# Patient Record
Sex: FEMALE | Race: WHITE | NOT HISPANIC OR LATINO | Employment: OTHER | URBAN - METROPOLITAN AREA
[De-identification: names, ages, dates, MRNs, and addresses within clinical notes are randomized per-mention and may not be internally consistent; named-entity substitution may affect disease eponyms.]

---

## 2017-03-17 ENCOUNTER — OFFICE VISIT (OUTPATIENT)
Dept: OBSTETRICS AND GYNECOLOGY | Facility: CLINIC | Age: 72
End: 2017-03-17
Payer: COMMERCIAL

## 2017-03-17 VITALS
DIASTOLIC BLOOD PRESSURE: 60 MMHG | WEIGHT: 153.69 LBS | SYSTOLIC BLOOD PRESSURE: 100 MMHG | BODY MASS INDEX: 26.24 KG/M2 | HEIGHT: 64 IN

## 2017-03-17 DIAGNOSIS — Z01.419 WELL FEMALE EXAM WITH ROUTINE GYNECOLOGICAL EXAM: Primary | ICD-10-CM

## 2017-03-17 DIAGNOSIS — Z12.39 BREAST CANCER SCREENING: ICD-10-CM

## 2017-03-17 PROCEDURE — 99999 PR PBB SHADOW E&M-EST. PATIENT-LVL III: CPT | Mod: PBBFAC,,, | Performed by: OBSTETRICS & GYNECOLOGY

## 2017-03-17 PROCEDURE — 99397 PER PM REEVAL EST PAT 65+ YR: CPT | Mod: S$GLB,,, | Performed by: OBSTETRICS & GYNECOLOGY

## 2017-03-17 PROCEDURE — 88175 CYTOPATH C/V AUTO FLUID REDO: CPT

## 2017-03-17 NOTE — MR AVS SNAPSHOT
"    [facility]   [facility]  [place] [address]  Phone: [phone]                  Sury Gao   3/17/2017 3:30 PM   Office Visit    Description:  Female : 1945   Provider:  Magnolia Zavala MD   Department:  [facility]           Reason for Visit     Well Woman     Mammogram                To Do List           Future Appointments        Provider Department Dept Phone    3/17/2017 3:30 PM Magnolia Zavala MD [facility]/ -[phone]      Goals (5 Years of Data)     None      [facility]     [facility] Care [facility] -  Assistance  Registered nurses in the [facility] provide clinical advisement, health education, appointment booking, and other advisory services.  Call for this free service at [phone].             Medications           Message regarding Medications     Verify the changes and/or additions to your medication regime listed below are the same as discussed with your clinician today.  If any of these changes or additions are incorrect, please notify your healthcare provider.             Verify that the below list of medications is an accurate representation of the medications you are currently taking.  If none reported, the list may be blank. If incorrect, please contact your healthcare provider. Carry this list with you in case of emergency.           Current Medications     fexofenadine (ALLEGRA) 30 MG tablet Take 30 mg by mouth continuous prn.    levothyroxine (SYNTHROID) 75 MCG tablet Brand name only.    loratadine (CLARITIN) 10 mg tablet Take 10 mg by mouth daily as needed.           Clinical Reference Information           Your Vitals Were     BP Height Weight BMI       100/60 (BP Location: Left arm, Patient Position: Sitting, BP Method: Manual) 5' 4" (1.626 m) 69.7 kg (153 lb 10.6 oz) 26.38 kg/m2       Blood Pressure          Most Recent Value    BP  100/60      Allergies as of 3/17/2017     No " Known Allergies      Immunizations Administered on Date of Encounter - 3/17/2017     None      Language Assistance Services     ATTENTION: Language assistance services are available, free of charge. Please call [phone].      ATENCIÓN: Si habyovani pagan, tiene a aden disposición servicios gratuitos de asistencia lingüística. Llame al [phone].     CHÚ Ý: N?u b?n nói Ti?ng Vi?t, có các d?ch v? h? tr? ngôn ng? mi?n phí dành cho b?n. G?i s? [phone].         [facility] complies with applicable Federal civil rights laws and does not discriminate on the basis of race, color, national origin, age, disability, or sex.

## 2017-03-20 NOTE — PROGRESS NOTES
SUBJECTIVE:   71 y.o. female   for routine gyn exam. No LMP recorded. Patient is postmenopausal..  She has no unusual complaints.  No HRT.  No PMB         Past Medical History:   Diagnosis Date    Hypothyroidism     Nuclear sclerosis 2014    Thyroid disease     hypothyroid     Past Surgical History:   Procedure Laterality Date    gutierrez neuroma      left foot    WRIST SURGERY      left wrist: Swave-Kapunjay surgery     Social History     Social History    Marital status:      Spouse name: N/A    Number of children: N/A    Years of education: N/A     Occupational History    Not on file.     Social History Main Topics    Smoking status: Former Smoker    Smokeless tobacco: Never Used    Alcohol use Yes      Comment: social    Drug use: No    Sexual activity: Not Currently     Other Topics Concern    Not on file     Social History Narrative     Family History   Problem Relation Age of Onset    Hypothyroidism Mother     Cataracts Mother     Heart disease Mother      Valvular heart disease    Cataracts Father     Macular degeneration Father     Hypothyroidism Sister     Hypothyroidism Daughter     Cancer Brother      testicular cancer    Amblyopia Neg Hx     Blindness Neg Hx     Glaucoma Neg Hx     Retinal detachment Neg Hx     Strabismus Neg Hx     Breast cancer Neg Hx     Colon cancer Neg Hx     Diabetes Neg Hx     Hypertension Neg Hx     Ovarian cancer Neg Hx      OB History    Para Term  AB SAB TAB Ectopic Multiple Living   4 3   1 1    3      # Outcome Date GA Lbr Bertin/2nd Weight Sex Delivery Anes PTL Lv   4 SAB            3 Para            2 Para            1 Para                     Current Outpatient Prescriptions   Medication Sig Dispense Refill    fexofenadine (ALLEGRA) 30 MG tablet Take 30 mg by mouth continuous prn.      levothyroxine (SYNTHROID) 75 MCG tablet Brand name only. 90 tablet 3    loratadine (CLARITIN) 10 mg tablet Take 10 mg by  "mouth daily as needed.       No current facility-administered medications for this visit.      Allergies: Review of patient's allergies indicates no known allergies.     ROS:  Constitutional: no weight loss, weight gain, fever, fatigue  Eyes:  No vision changes, glasses/contacts  ENT/Mouth: No ulcers, sinus problems, ears ringing, headache  Cardiovascular: No inability to lie flat, chest pain, exercise intolerance, swelling, heart palpitations  Respiratory: No wheezing, coughing blood, shortness of breath, or cough  Gastrointestinal: No diarrhea, bloody stool, nausea/vomiting, constipation, gas, hemorrhoids  Genitourinary: No blood in urine, painful urination, urgency of urination, frequency of urination, incomplete emptying, incontinence, abnormal bleeding, painful periods, heavy periods, vaginal discharge, vaginal odor, painful intercourse, sexual problems, bleeding after intercourse.  Musculoskeletal: No muscle weakness  Skin/Breast: No painful breasts, nipple discharge, masses, rash, ulcers  Neurological: No passing out, seizures, numbness, headache  Endocrine: No diabetes, hypothyroid, hyperthyroid, hot flashes, hair loss, abnormal hair growth, ance  Psychiatric: No depression, crying  Hematologic: No bruises, bleeding, swollen lymph nodes, anemia.      OBJECTIVE:   The patient appears well, alert, oriented x 3, in no distress.  /60 (BP Location: Left arm, Patient Position: Sitting, BP Method: Manual)  Ht 5' 4" (1.626 m)  Wt 69.7 kg (153 lb 10.6 oz)  BMI 26.38 kg/m2  NECK: no thyromegaly, trachea midline  SKIN: no acne, striae, hirsutism  CHEST: CTAB  CV: RRR  BREAST EXAM: breasts appear normal, no suspicious masses, no skin or nipple changes or axillary nodes  ABDOMEN: no hernias, masses, or hepatosplenomegaly  GENITALIA: normal external genitalia, no erythema, no discharge  URETHRA: normal urethra, normal urethral meatus  VAGINA: Normal  CERVIX: no lesions or cervical motion tenderness  UTERUS: " normal size, contour, position, consistency, mobility, non-tender  ADNEXA: no mass, fullness, tenderness      ASSESSMENT:   1. Well female exam with routine gynecological exam  Liquid-based pap smear, screening   2. Breast cancer screening  Mammo Digital Screening Bilat with CAD       PLAN:   Orders Placed This Encounter    Mammo Digital Screening Bilat with CAD    Liquid-based pap smear, screening     Return to clinic in 1 year

## 2017-03-30 ENCOUNTER — HOSPITAL ENCOUNTER (OUTPATIENT)
Dept: RADIOLOGY | Facility: HOSPITAL | Age: 72
Discharge: HOME | End: 2017-03-30
Attending: OBSTETRICS & GYNECOLOGY
Payer: COMMERCIAL

## 2017-03-30 DIAGNOSIS — Z12.39 BREAST CANCER SCREENING: ICD-10-CM

## 2017-03-30 PROCEDURE — 77067 SCR MAMMO BI INCL CAD: CPT | Mod: 26,,, | Performed by: RADIOLOGY

## 2017-03-30 PROCEDURE — 77063 BREAST TOMOSYNTHESIS BI: CPT | Mod: 26,,, | Performed by: RADIOLOGY

## 2017-03-30 PROCEDURE — 77067 SCR MAMMO BI INCL CAD: CPT | Mod: TC

## 2017-04-07 ENCOUNTER — HOSPITAL ENCOUNTER (OUTPATIENT)
Dept: RADIOLOGY | Facility: HOSPITAL | Age: 72
Discharge: HOME | End: 2017-04-07
Attending: FAMILY MEDICINE
Payer: COMMERCIAL

## 2017-04-07 ENCOUNTER — OFFICE VISIT (OUTPATIENT)
Dept: INTERNAL MEDICINE | Facility: CLINIC | Age: 72
End: 2017-04-07
Payer: COMMERCIAL

## 2017-04-07 VITALS
SYSTOLIC BLOOD PRESSURE: 114 MMHG | WEIGHT: 151.88 LBS | DIASTOLIC BLOOD PRESSURE: 62 MMHG | HEART RATE: 74 BPM | HEIGHT: 64 IN | TEMPERATURE: 98 F | BODY MASS INDEX: 25.93 KG/M2

## 2017-04-07 DIAGNOSIS — M79.674 TOE PAIN, RIGHT: Primary | ICD-10-CM

## 2017-04-07 DIAGNOSIS — M79.674 TOE PAIN, RIGHT: ICD-10-CM

## 2017-04-07 PROCEDURE — 1160F RVW MEDS BY RX/DR IN RCRD: CPT | Mod: S$GLB,,, | Performed by: FAMILY MEDICINE

## 2017-04-07 PROCEDURE — 1159F MED LIST DOCD IN RCRD: CPT | Mod: S$GLB,,, | Performed by: FAMILY MEDICINE

## 2017-04-07 PROCEDURE — 1157F ADVNC CARE PLAN IN RCRD: CPT | Mod: S$GLB,,, | Performed by: FAMILY MEDICINE

## 2017-04-07 PROCEDURE — 73630 X-RAY EXAM OF FOOT: CPT | Mod: TC,PO,RT

## 2017-04-07 PROCEDURE — 1125F AMNT PAIN NOTED PAIN PRSNT: CPT | Mod: S$GLB,,, | Performed by: FAMILY MEDICINE

## 2017-04-07 PROCEDURE — 99214 OFFICE O/P EST MOD 30 MIN: CPT | Mod: S$GLB,,, | Performed by: FAMILY MEDICINE

## 2017-04-07 PROCEDURE — 99999 PR PBB SHADOW E&M-EST. PATIENT-LVL III: CPT | Mod: PBBFAC,,, | Performed by: FAMILY MEDICINE

## 2017-04-07 PROCEDURE — 73630 X-RAY EXAM OF FOOT: CPT | Mod: 26,RT,, | Performed by: RADIOLOGY

## 2017-04-07 RX ORDER — METHYLPREDNISOLONE 4 MG/1
TABLET ORAL
Qty: 1 PACKAGE | Refills: 0 | Status: SHIPPED | OUTPATIENT
Start: 2017-04-07 | End: 2017-04-28

## 2017-04-07 NOTE — PROGRESS NOTES
Subjective:       Patient ID: Sury Gao is a 71 y.o. female.    Chief Complaint: Leg Pain and Foot Swelling (possible gout)    HPI 71-year-old white female presents to clinic today secondary to right great toe pain and bilateral leg swelling that has been worsening since Saturday.  She reports an initial possible injury to her right toe approximately 1-1/2 weeks ago when she was doing aerobics without wearing shoes.  The following day she began having pain to her right great toe.  A physician friend of hers examined the toe at that time.  Her toe pain improved but on Saturday the pain returned.  The patient reports that she had dinner consisting of seafood and steaks 4 nights in a row.  The pain has gradually worsened and on Saturday she began to notice swelling to the bilateral feet.  Swelling has improved with rest but the right great toe pain persists.  Review of Systems   Constitutional: Negative for appetite change, chills, fatigue and fever.   HENT: Negative for congestion, ear pain, hearing loss, postnasal drip, rhinorrhea, sinus pressure, sore throat and tinnitus.    Eyes: Negative for redness, itching and visual disturbance.   Respiratory: Negative for cough, chest tightness and shortness of breath.    Cardiovascular: Negative for chest pain and palpitations.   Gastrointestinal: Negative for abdominal pain, constipation, diarrhea, nausea and vomiting.   Genitourinary: Negative for decreased urine volume, difficulty urinating, dysuria, frequency, hematuria and urgency.   Musculoskeletal: Positive for arthralgias (Feet and toes). Negative for back pain, myalgias, neck pain and neck stiffness.   Skin: Negative for rash.   Neurological: Negative for dizziness, light-headedness and headaches.   Psychiatric/Behavioral: Negative.        Objective:      Physical Exam   Constitutional: She is oriented to person, place, and time. She appears well-developed and well-nourished. No distress.   HENT:   Head:  Normocephalic and atraumatic.   Right Ear: External ear normal.   Left Ear: External ear normal.   Nose: Nose normal.   Mouth/Throat: Oropharynx is clear and moist. No oropharyngeal exudate.   Eyes: Conjunctivae and EOM are normal. Pupils are equal, round, and reactive to light. Right eye exhibits no discharge. Left eye exhibits no discharge. No scleral icterus.   Neck: Normal range of motion.   Musculoskeletal: Normal range of motion. She exhibits no edema.        Right foot: There is tenderness (tenderness to palpation to the right great toe).   Neurological: She is alert and oriented to person, place, and time.   Skin: Skin is warm and dry. No rash noted. She is not diaphoretic. No erythema. No pallor.   Psychiatric: She has a normal mood and affect. Her behavior is normal. Judgment and thought content normal.   Nursing note and vitals reviewed.      Assessment:       1. Toe pain, right        Plan:       Toe pain, right  -     Uric acid; Future; Expected date: 4/7/17  -     X-Ray Foot Complete 3 view Right; Future; Expected date: 4/7/17  -     methylPREDNISolone (MEDROL DOSEPACK) 4 mg tablet; use as directed  Dispense: 1 Package; Refill: 0      Return to clinic as needed if symptoms persist or worsen.

## 2017-04-07 NOTE — MR AVS SNAPSHOT
[facility]   [facility]  [place] [address]  Phone: [phone]  Fax: [phone]                  Sury Meza Murray   2017 3:40 PM   Office Visit    Description:  Female : 1945   Provider:  Aaron Ann MD   Department:  [facility]           Reason for Visit     Leg Pain     Foot Swelling           Diagnoses this Visit        Comments    Toe pain, right    -  Primary            To Do List           Future Appointments        Provider Department Dept Phone    2017 4:15 PM LAB, [place] [facility] [phone]      Goals (5 Years of Data)     None      Follow-Up and Disposition     Return if symptoms worsen or fail to improve.       These Medications        Disp Refills Start End    methylPREDNISolone (MEDROL DOSEPACK) 4 mg tablet 1 Package 0 2017    use as directed    Pharmacy: [id number] - [place] - [address] [facility] #: [phone]         [facility]     [facility] Nurse Care Line -  Assistance  Unless otherwise directed by your provider, please contact [facility], our nurse care line that is available for  assistance.     Registered nurses in the [facility] provide: appointment scheduling, clinical advisement, health education, and other advisory services.  Call: [phone] (toll free)               Medications           Message regarding Medications     Verify the changes and/or additions to your medication regime listed below are the same as discussed with your clinician today.  If any of these changes or additions are incorrect, please notify your healthcare provider.        START taking these NEW medications        Refills    methylPREDNISolone (MEDROL DOSEPACK) 4 mg tablet 0    Sig: use as directed    Class: Normal           Verify that the below list of medications is an accurate representation of the medications you are currently  "taking.  If none reported, the list may be blank. If incorrect, please contact your healthcare provider. Carry this list with you in case of emergency.           Current Medications     fexofenadine (ALLEGRA) 30 MG tablet Take 30 mg by mouth continuous prn.    levothyroxine (SYNTHROID) 75 MCG tablet Brand name only.    loratadine (CLARITIN) 10 mg tablet Take 10 mg by mouth daily as needed.    methylPREDNISolone (MEDROL DOSEPACK) 4 mg tablet use as directed           Clinical Reference Information           Your Vitals Were     BP Pulse Temp Height Weight BMI    114/62 (BP Location: Left arm, Patient Position: Sitting, BP Method: Manual) 74 97.8 °F (36.6 °C) (Oral) 5' 4" (1.626 m) 68.9 kg (151 lb 14.4 oz) 26.07 kg/m2      Blood Pressure          Most Recent Value    BP  114/62      Allergies as of 4/7/2017     No Known Allergies      Immunizations Administered on Date of Encounter - 4/7/2017     None      Orders Placed During Today's Visit     Future Labs/Procedures Expected by Expires    Uric acid  4/7/2017 6/6/2018    X-Ray Foot Complete 3 view Right  4/7/2017 4/7/2018      [facility] Sign-Up     Activating your [facility] account is as easy as 1-2-3!     1) Visit [email or web address], select Sign Up Now, enter this activation code and your date of birth, then select Next.  Activation code not generated  Current Patient Portal Status: Account disabled      2) Create a username and password to use when you visit [facility] in the future and select a security question in case you lose your password and select Next.    3) Enter your e-mail address and click Sign Up!    Additional Information  If you have questions, please e-mail [email or web address] or call [phone] to talk to our [facility] staff. Remember, [facility] is NOT to be used for urgent needs. For medical emergencies, dial 911.         Language Assistance Services     ATTENTION: Language assistance services are available, free of charge. Please call " [phone].      ATENCIÓN: Si habla español, tiene a aden disposición servicios gratuitos de asistencia lingüística. Llame al [phone].     CHÚ Ý: N?u b?n nói Ti?ng Vi?t, có các d?ch v? h? tr? ngôn ng? mi?n phí dành cho b?n. G?i s? [phone].         [facility] complies with applicable Federal civil rights laws and does not discriminate on the basis of race, color, national origin, age, disability, or sex.

## 2017-04-10 ENCOUNTER — TELEPHONE (OUTPATIENT)
Dept: INTERNAL MEDICINE | Facility: CLINIC | Age: 72
End: 2017-04-10

## 2017-04-10 NOTE — TELEPHONE ENCOUNTER
----- Message from Aaron Ann MD sent at 4/9/2017  1:25 PM CDT -----  Uric acid level is normal.  Please inform the patient.  Thank you.

## 2017-09-01 DIAGNOSIS — Z00.00 WELL ADULT EXAM: Primary | ICD-10-CM

## 2017-09-01 DIAGNOSIS — E03.9 HYPOTHYROIDISM, UNSPECIFIED TYPE: ICD-10-CM

## 2017-09-01 RX ORDER — LEVOTHYROXINE SODIUM 75 UG/1
TABLET ORAL
Qty: 90 TABLET | Refills: 3 | Status: SHIPPED | OUTPATIENT
Start: 2017-09-01 | End: 2017-10-27 | Stop reason: SDUPTHER

## 2017-09-01 NOTE — TELEPHONE ENCOUNTER
----- Message from Dayna Quispe sent at 9/1/2017 11:13 AM CDT -----  Contact: self/ [phone]  Doctor appointment and lab have been scheduled.  Please link lab orders to the lab appointment.  Date of doctor appointment: 10/27/17  Physical or EP:  phys  Date of lab appointment:  10/23/17  Comments:     RX request - refill or new RX.  Is this a refill or new RX:    RX name and strength: levothyroxine (SYNTHROID) 75 MCG tablet 90 tablet   Directions:   Is this a 30 day or 90 day RX:    Pharmacy name and phone #: [facility]/pharmacy #[id number] - [place] - [address] [facility] [phone] (Phone)  [phone] (Fax)      Comments:   Patient is going [place] country [place]17[place] and would like her  levothyroxine filled, please call and advise. Patient has scheduled her phys.

## 2017-09-30 DIAGNOSIS — E03.9 HYPOTHYROIDISM, UNSPECIFIED TYPE: ICD-10-CM

## 2017-09-30 RX ORDER — LEVOTHYROXINE SODIUM 75 UG/1
TABLET ORAL
Qty: 90 TABLET | Refills: 3 | Status: SHIPPED | OUTPATIENT
Start: 2017-09-30 | End: 2018-09-26 | Stop reason: SDUPTHER

## 2017-10-23 ENCOUNTER — LAB VISIT (OUTPATIENT)
Dept: LAB | Facility: HOSPITAL | Age: 72
End: 2017-10-23
Attending: FAMILY MEDICINE
Payer: COMMERCIAL

## 2017-10-23 DIAGNOSIS — Z00.00 WELL ADULT EXAM: ICD-10-CM

## 2017-10-23 DIAGNOSIS — E03.9 HYPOTHYROIDISM, UNSPECIFIED TYPE: ICD-10-CM

## 2017-10-23 LAB
25(OH)D3+25(OH)D2 SERPL-MCNC: 18 NG/ML
ALBUMIN SERPL BCP-MCNC: 3 G/DL
ALP SERPL-CCNC: 45 U/L
ALT SERPL W/O P-5'-P-CCNC: 13 U/L
ANION GAP SERPL CALC-SCNC: 5 MMOL/L
AST SERPL-CCNC: 17 U/L
BASOPHILS # BLD AUTO: 0.09 K/UL
BASOPHILS NFR BLD: 1.4 %
BILIRUB SERPL-MCNC: 0.6 MG/DL
BUN SERPL-MCNC: 19 MG/DL
CALCIUM SERPL-MCNC: 9.1 MG/DL
CHLORIDE SERPL-SCNC: 106 MMOL/L
CHOLEST SERPL-MCNC: 248 MG/DL
CHOLEST/HDLC SERPL: 3.4 {RATIO}
CO2 SERPL-SCNC: 28 MMOL/L
CREAT SERPL-MCNC: 0.9 MG/DL
DIFFERENTIAL METHOD: ABNORMAL
EOSINOPHIL # BLD AUTO: 0.1 K/UL
EOSINOPHIL NFR BLD: 1.7 %
ERYTHROCYTE [DISTWIDTH] IN BLOOD BY AUTOMATED COUNT: 12.9 %
EST. GFR  (AFRICAN AMERICAN): >60 ML/MIN/1.73 M^2
EST. GFR  (NON AFRICAN AMERICAN): >60 ML/MIN/1.73 M^2
ESTIMATED AVG GLUCOSE: 105 MG/DL
GLUCOSE SERPL-MCNC: 99 MG/DL
HBA1C MFR BLD HPLC: 5.3 %
HCT VFR BLD AUTO: 44.5 %
HDLC SERPL-MCNC: 73 MG/DL
HDLC SERPL: 29.4 %
HGB BLD-MCNC: 14.6 G/DL
IMM GRANULOCYTES # BLD AUTO: 0.04 K/UL
IMM GRANULOCYTES NFR BLD AUTO: 0.6 %
LDLC SERPL CALC-MCNC: 162.2 MG/DL
LYMPHOCYTES # BLD AUTO: 2.5 K/UL
LYMPHOCYTES NFR BLD: 38.7 %
MCH RBC QN AUTO: 30.5 PG
MCHC RBC AUTO-ENTMCNC: 32.8 G/DL
MCV RBC AUTO: 93 FL
MONOCYTES # BLD AUTO: 0.5 K/UL
MONOCYTES NFR BLD: 7.1 %
NEUTROPHILS # BLD AUTO: 3.2 K/UL
NEUTROPHILS NFR BLD: 50.5 %
NONHDLC SERPL-MCNC: 175 MG/DL
NRBC BLD-RTO: 0 /100 WBC
PLATELET # BLD AUTO: 250 K/UL
PMV BLD AUTO: 11.7 FL
POTASSIUM SERPL-SCNC: 4.8 MMOL/L
PROT SERPL-MCNC: 6 G/DL
RBC # BLD AUTO: 4.78 M/UL
SODIUM SERPL-SCNC: 139 MMOL/L
T4 FREE SERPL-MCNC: 0.93 NG/DL
TRIGL SERPL-MCNC: 64 MG/DL
TSH SERPL DL<=0.005 MIU/L-ACNC: 2.47 UIU/ML
WBC # BLD AUTO: 6.38 K/UL

## 2017-10-23 PROCEDURE — 36415 COLL VENOUS BLD VENIPUNCTURE: CPT | Mod: PO

## 2017-10-23 PROCEDURE — 80053 COMPREHEN METABOLIC PANEL: CPT

## 2017-10-23 PROCEDURE — 83036 HEMOGLOBIN GLYCOSYLATED A1C: CPT

## 2017-10-23 PROCEDURE — 84443 ASSAY THYROID STIM HORMONE: CPT

## 2017-10-23 PROCEDURE — 82306 VITAMIN D 25 HYDROXY: CPT

## 2017-10-23 PROCEDURE — 85025 COMPLETE CBC W/AUTO DIFF WBC: CPT

## 2017-10-23 PROCEDURE — 80061 LIPID PANEL: CPT

## 2017-10-23 PROCEDURE — 84439 ASSAY OF FREE THYROXINE: CPT

## 2017-10-24 ENCOUNTER — TELEPHONE (OUTPATIENT)
Dept: INTERNAL MEDICINE | Facility: CLINIC | Age: 72
End: 2017-10-24

## 2017-10-24 DIAGNOSIS — E78.5 HYPERLIPIDEMIA, UNSPECIFIED HYPERLIPIDEMIA TYPE: Primary | ICD-10-CM

## 2017-10-24 DIAGNOSIS — E55.9 VITAMIN D DEFICIENCY: ICD-10-CM

## 2017-10-24 RX ORDER — ERGOCALCIFEROL 1.25 MG/1
50000 CAPSULE ORAL
Qty: 4 CAPSULE | Refills: 2 | Status: SHIPPED | OUTPATIENT
Start: 2017-10-24 | End: 2017-11-23

## 2017-10-24 NOTE — TELEPHONE ENCOUNTER
The patient's labs are overall normal except for a low vitamin D level for which I have prescribed vitamin D 50,000 units once weekly to be taken for 3 months.  I will repeat a vitamin D level in 3 months.  The patient's cholesterol is also high.  I recommend that she follows a low-fat diet for treatment.  Please inform the patient.  Thank you.

## 2017-10-25 NOTE — TELEPHONE ENCOUNTER
Made lab appt for Vitamin D & Lipid panel for in 3 months. Pt has appt on Friday. Will give her the appt time information on Friday

## 2017-10-25 NOTE — TELEPHONE ENCOUNTER
Spoke with pt re: labs. Labs, WNL except for vitamin D & cholesterol. Pt just got back from [place], so that could be why her cholesterol is high otherwise she normally follows a low fat diet.  We will recheck vitamin D in 3 months & possibly cholesterol too.

## 2017-10-27 ENCOUNTER — OFFICE VISIT (OUTPATIENT)
Dept: INTERNAL MEDICINE | Facility: CLINIC | Age: 72
End: 2017-10-27
Payer: COMMERCIAL

## 2017-10-27 VITALS
BODY MASS INDEX: 26.31 KG/M2 | HEART RATE: 70 BPM | HEIGHT: 64 IN | DIASTOLIC BLOOD PRESSURE: 60 MMHG | OXYGEN SATURATION: 96 % | WEIGHT: 154.13 LBS | TEMPERATURE: 98 F | RESPIRATION RATE: 16 BRPM | SYSTOLIC BLOOD PRESSURE: 106 MMHG

## 2017-10-27 DIAGNOSIS — Z12.11 COLON CANCER SCREENING: ICD-10-CM

## 2017-10-27 DIAGNOSIS — E78.5 HYPERLIPIDEMIA, UNSPECIFIED HYPERLIPIDEMIA TYPE: ICD-10-CM

## 2017-10-27 DIAGNOSIS — Z00.00 WELL ADULT EXAM: Primary | ICD-10-CM

## 2017-10-27 DIAGNOSIS — E03.9 HYPOTHYROIDISM, UNSPECIFIED TYPE: ICD-10-CM

## 2017-10-27 DIAGNOSIS — E55.9 VITAMIN D DEFICIENCY: ICD-10-CM

## 2017-10-27 DIAGNOSIS — Z23 NEED FOR 23-POLYVALENT PNEUMOCOCCAL POLYSACCHARIDE VACCINE: ICD-10-CM

## 2017-10-27 PROCEDURE — 90732 PPSV23 VACC 2 YRS+ SUBQ/IM: CPT | Mod: S$GLB,,, | Performed by: FAMILY MEDICINE

## 2017-10-27 PROCEDURE — 99397 PER PM REEVAL EST PAT 65+ YR: CPT | Mod: 25,S$GLB,, | Performed by: FAMILY MEDICINE

## 2017-10-27 PROCEDURE — 99999 PR PBB SHADOW E&M-EST. PATIENT-LVL III: CPT | Mod: PBBFAC,,, | Performed by: FAMILY MEDICINE

## 2017-10-27 PROCEDURE — 90471 IMMUNIZATION ADMIN: CPT | Mod: S$GLB,,, | Performed by: FAMILY MEDICINE
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
## 2017-11-17 DIAGNOSIS — Z12.11 SPECIAL SCREENING FOR MALIGNANT NEOPLASMS, COLON: Primary | ICD-10-CM

## 2017-11-17 RX ORDER — SODIUM, POTASSIUM,MAG SULFATES 17.5-3.13G
SOLUTION, RECONSTITUTED, ORAL ORAL
Qty: 1 BOTTLE | Refills: 0 | Status: SHIPPED | OUTPATIENT
Start: 2017-11-17 | End: 2017-12-20

## 2017-12-12 ENCOUNTER — TELEPHONE (OUTPATIENT)
Dept: OBSTETRICS AND GYNECOLOGY | Facility: CLINIC | Age: 72
End: 2017-12-12

## 2017-12-12 NOTE — TELEPHONE ENCOUNTER
----- Message from Angela Villarreal sent at 12/12/2017  8:48 AM CST -----  Contact: pt  _  1st Request  _  2nd Request  _  3rd x      Who:pt    Why: returning call back    What Number to Call Back: [phone]    When to Expect a call back: (Before the end of the day)   -- if call after 3:00 call back will be tomorrow.

## 2017-12-12 NOTE — TELEPHONE ENCOUNTER
Patient was scheduled for 12/20/2017 for possible bladder prolapse. I offered her an appointment today- she is a  an has cases this am. Patient scheduled for 12/20/2017

## 2017-12-12 NOTE — TELEPHONE ENCOUNTER
----- Message from Dulce Goodwin sent at 12/12/2017  8:34 AM CST -----  Contact: Patient    _1st Request  _  2nd Request  _  3rd Request    Who:ANGELO MCKEON [[id number]]    Why:Patient states she needs to be seen asap because she states she feels like she has a prolapse bladder     What Number to Call Back:[phone]    When to Expect a call back: (Before the end of the day)   -- if call after 3:00 call back will be tomorrow.

## 2017-12-20 ENCOUNTER — INITIAL CONSULT (OUTPATIENT)
Dept: UROGYNECOLOGY | Facility: CLINIC | Age: 72
End: 2017-12-20
Payer: COMMERCIAL

## 2017-12-20 VITALS
SYSTOLIC BLOOD PRESSURE: 102 MMHG | BODY MASS INDEX: 25.97 KG/M2 | HEIGHT: 64 IN | DIASTOLIC BLOOD PRESSURE: 70 MMHG | WEIGHT: 152.13 LBS

## 2017-12-20 DIAGNOSIS — N81.2 UTEROVAGINAL PROLAPSE, INCOMPLETE: ICD-10-CM

## 2017-12-20 DIAGNOSIS — N81.6 POSTERIOR VAGINAL WALL PROLAPSE: ICD-10-CM

## 2017-12-20 DIAGNOSIS — R35.0 URINARY FREQUENCY: Primary | ICD-10-CM

## 2017-12-20 DIAGNOSIS — R39.15 URINARY URGENCY: ICD-10-CM

## 2017-12-20 DIAGNOSIS — N81.10 PROLAPSE OF ANTERIOR VAGINAL WALL: ICD-10-CM

## 2017-12-20 PROCEDURE — 51701 INSERT BLADDER CATHETER: CPT | Mod: S$GLB,,, | Performed by: OBSTETRICS & GYNECOLOGY

## 2017-12-20 PROCEDURE — 99999 PR PBB SHADOW E&M-EST. PATIENT-LVL III: CPT | Mod: PBBFAC,,, | Performed by: OBSTETRICS & GYNECOLOGY

## 2017-12-20 PROCEDURE — 81002 URINALYSIS NONAUTO W/O SCOPE: CPT | Mod: S$GLB,,, | Performed by: OBSTETRICS & GYNECOLOGY

## 2017-12-20 PROCEDURE — 87086 URINE CULTURE/COLONY COUNT: CPT

## 2017-12-20 PROCEDURE — 99204 OFFICE O/P NEW MOD 45 MIN: CPT | Mod: 25,S$GLB,, | Performed by: OBSTETRICS & GYNECOLOGY

## 2017-12-20 NOTE — LETTER
December 20, 2017      Magnolia Zavala MD  [address]  [address]  [place] [address]           [facility]  [address] [address]  Phone: [phone]          Patient: Sury Gao   MR Number: [id number]   YOB: 1945   Date of Visit: 12/20/2017       Dear Dr. Magnolia Zavala:    Thank you for referring Sury Gao to me for evaluation. Attached you will find relevant portions of my assessment and plan of care.    If you have questions, please do not hesitate to call me. I look forward to following Sury Gao along with you.    Sincerely,    DO Bev Banejree  CC:  No Recipients    If you would like to receive this communication electronically, please contact [email or web address] or [phone] to request more information on [facility] access.    For providers and/or their staff who would like to refer a patient to [facility], please contact us through our one-stop-shop provider referral line, [facility], at [phone].    If you feel you have received this communication in error or would no longer like to receive these types of communications, please e-mail [email or web address]

## 2017-12-20 NOTE — PROGRESS NOTES
Subjective:       Patient ID: Sury Gao is a 72 y.o. female.    Chief Complaint:  Vaginal Prolapse; Urinary Incontinence; and PELVIC PRESSURE      History of Present Illness 72 Y O F P3 with bothersome one vaginal bulge and pelvic pressure noted while she was exercising. Patient has also noted urinary incontinence associated with urgency. She was referred for evaluation by Dr. Zavala.     HPI [facility] P[facility] Urogyn Hpi     Question 12/20/2017  2:12 PM CST   General Urogynecology: Are you experiencing the following?    Dysuria (painful urination) No   Nocturia:  waking up at night to empty your bladder  Yes more bothersome   If you answered yes to the previous question, how many times does this happen per night? 1-2   Enuresis (urine loss during sleep) No   Dribbling urine after you urinate Yes   Hematuria (urine appears red) No   Type of stream Interrupted, recent    Urinary frequency: How often a day are you going to the bathroom per day?  10-15   Urinary Tract Infections: How many Urinary Tract Infections have you had in the past year? One (1) in the past year   If you have had a UTI in the past year, what treatments have you had so far?  I have not had a UTI in the past year   Urinary Incontinence (General): Are you experiencing the following?    Past consultation for incontinence: Have you ever seen someone for the evaluation of incontinence? No   If you answered yes to the previous question, please select all the therapies you have tried.  N/a- I answered no to the previous question   Please note the effectiveness of the therapies.    Need to wear protection to keep clothes dry  No   If you answered yes to the previous question, please yodit the protection you use.  N/a- I answered no to the previous question   If you wear protection, how much wetness is typically on each pad? N/a- I do not wear protection   If you wear protection, how often do you have to change per day, if applicable?     Stress  "Symptoms: Are you experiencing the following?    Leakage of urine with cough, laugh and/or sneeze Yes   If you answered yes to the previous question, what is the frequency in days, weeks and/or months? Several times a month   Leakage of urine with sex No   Leakage of urine with bending/ lifting No   Leakage of urine with briskly walking or jogging Yes   If you lose urine for any other reason not previously mentioned, please note it below, if applicable.     Urge Symptoms: Are you experiencing the following?    Urgency ("got to go" feeling) No   Urge: How frequently do you feel an urge to urinate (feeling like you "gotta go" to the bathroom and can't wait) Weekly   Do you experience a leakage of urine when you have a feeling of urgency?  Yes   Leakage of urine when unaware No   Past use of anticholinergics (medications used to treat overactive bladder) No   If you answered yes to the previous question, please yodit the anticholinergics you have used:     Have you ever used Mirbetriq (aka Mirabegron)?  No   Prolapse Symptoms: Are you experiencing any of the following?     Falling out/ Bulging/ Heaviness in the vagina Yes   Vaginal/ Abdominal Pain/ Pressure Yes   Need to strain/ Push to void No   Need to wait on the toilet before you void No   Unusual position to urinate (using your hands to push back the vaginal bulge) No   Sensation of incomplete emptying Yes, new since the prolapse    Past use of pessary device No   If you answered yes to the previous question, please list the devices you have used below.     Bowel Symptoms: Are you experiencing any of the following?    Constipation No   Diarrhea  No   Hematochezia (bloody stool) No   Incomplete evacuation of stool No   Involuntary loss of formed stool No   Fecal smearing/urgency No   Involuntary loss of gas No   Vaginal Symptoms: Are you experiencing any of the following?     Abnormal vaginal bleeding  No   Vaginal dryness No   Sexually active  No   Dyspareunia " (painful intercourse) No   Estrogen use  No     GYN & OB History  No LMP recorded. Patient is postmenopausal.   Date of Last Pap: 3/23/2017    OB History    Para Term  AB Living   4 3     1 3   SAB TAB Ectopic Multiple Live Births   1       3      # Outcome Date GA Lbr Bertin/2nd Weight Sex Delivery Anes PTL Lv   4 SAB            3 Para            2 Para            1 Para                  VDX 3   Largest: 9#   Forceps: No  3rd  Or 4th degree : ?     Past Medical History:   Diagnosis Date    Hypothyroidism     Nuclear sclerosis 2014    Thyroid disease     hypothyroid     Past Surgical History:   Procedure Laterality Date    Bilateral lower extremity varicose vein treatment       gutierrez neuroma      left foot    WRIST SURGERY      left wrist: Swave-Kapunjay surgery   D&C - 45 years ago     Review of patient's allergies indicates:  No Known Allergies      Current Outpatient Prescriptions:     fexofenadine (ALLEGRA) 30 MG tablet, Take 30 mg by mouth continuous prn., Disp: , Rfl:     loratadine (CLARITIN) 10 mg tablet, Take 10 mg by mouth daily as needed., Disp: , Rfl:     SYNTHROID 75 mcg tablet, TAKE 1 TABLET BY MOUTH DAILY, Disp: 90 tablet, Rfl: 3    Review of Systems  Review of Systems   Constitutional: Negative.  Negative for activity change, appetite change, chills, diaphoresis, fatigue, fever and unexpected weight change.   HENT: Negative.    Eyes: Negative.    Respiratory: Negative.  Negative for cough.    Cardiovascular: Negative.    Gastrointestinal: Negative for abdominal distention, abdominal pain, anal bleeding, blood in stool, constipation, diarrhea, nausea, rectal pain and vomiting.   Endocrine: Negative.    Genitourinary: Positive for urgency. Negative for decreased urine volume, dyspareunia, dysuria, enuresis, flank pain, frequency, genital sores, hematuria, menstrual problem, pelvic pain, vaginal bleeding, vaginal discharge and vaginal pain.        Prolapse  + vaginal bulge    +vaginal pressure    Musculoskeletal: Negative for back pain.   Skin: Negative for color change, pallor, rash and wound.   Allergic/Immunologic: Negative for environmental allergies, food allergies and immunocompromised state.   Hematological: Negative for adenopathy. Does not bruise/bleed easily.   Psychiatric/Behavioral: Negative for agitation, behavioral problems, confusion and sleep disturbance.           Objective:     Physical Exam   Constitutional: She is oriented to person, place, and time. She appears well-developed.   HENT:   Head: Normocephalic and atraumatic.   Eyes: Conjunctivae and EOM are normal.   Neck: Normal range of motion. Neck supple.   Cardiovascular: Normal rate, regular rhythm, S1 normal, S2 normal, normal heart sounds and intact distal pulses.    Pulmonary/Chest: Effort normal and breath sounds normal. She exhibits no tenderness.   Abdominal: Soft. Bowel sounds are normal. She exhibits no distension and no mass. There is no hepatosplenomegaly, splenomegaly or hepatomegaly. There is no tenderness. There is no rigidity, no rebound, no guarding and no CVA tenderness. No hernia.   Genitourinary: Pelvic exam was performed with patient supine and standing. Rectum normal, vagina normal, uterus normal, cervix normal, skenes normal and bartholins normal. Right labia normal and left labia normal. Urethra exhibits hypermobility. Urethra exhibits no urethral caruncle, no urethral diverticulum and no urethral mass. Right bartholin is not enlarged and not tender. Left bartholin is not enlarged and not tender. Rectal exam shows resting tone normal and active tone normal. Rectal exam shows no external hemorrhoid, no fissure, no tenderness, anal tone normal and no dovetailing. Guaiac negative stool. There is a cystocele, unspecified prolapse of vaginal walls and atrophy in the vagina. No foreign body, tenderness, bleeding, fistula, mesh exposure or lavator tenderness in the vagina. No vaginal discharge  found. Right adnexum displays no mass and no tenderness. Left adnexum displays no mass and no tenderness. Cervix exhibits no motion tenderness and no discharge. Uterus is experiencing uterine prolapse. Uterus is not tender.   PVR: 70 ML  Empty cough stress test: Negative.  Kegel: 3/5    POP-Q  Aa: 0 Ba: 0 C: -2   GH: 4 PB: 2 TVL: 9   Ap: -1 Bp: -1 D: -4                     Musculoskeletal: Normal range of motion.   Lymphadenopathy:     She has no axillary adenopathy.        Right: No inguinal adenopathy present.        Left: No inguinal adenopathy present.   Neurological: She is alert and oriented to person, place, and time. She has normal strength and normal reflexes. Cranial nerves II through XII intact. No cranial nerve deficit.   Skin: Skin is warm, dry and intact.   Psychiatric: She has a normal mood and affect. Her speech is normal and behavior is normal. Judgment normal. Cognition and memory are normal.          Assessment:        1. Urinary frequency    2. Uterovaginal prolapse, incomplete    3. Prolapse of anterior vaginal wall    4. Posterior vaginal wall prolapse    5. Urinary urgency               Plan:        1. Prolapse: Stage 2  apical prolapse, Stage 2 anterior and posterior vaginal wall prolapse   --Pessary benefits discussed   --Daily pelvic floor exercises as assigned, consider Pelvic floor PT in future  --Non-surgical options discussed with patient    --Surgical options discussed such as apical suspension SSF vs USLS vs  SCP with mesh augmentation. We reviewed the pro's and con's of a hysterectomy rationale for total vs supracervical. We also discussed the options of hysteropexy. We reviewed the long term risks of mesh augmentation.  She works as a  and would need advanced notice. Risks/ benefits/ alternatives/ succuss and failure rates were reviewed.  She will call the office to schedule a follow up appointment. If we proceed with surgery and opts for a hysterectomy,  will have hysterectomy  with Dr. Zavala and I will suspend the apex.     Approximately  40 min were spent in consult, 90 % in discussion.     Thank you for requesting consultation of your patient.  I look forward to participating in her care.    Maxx Castellanos DO  Female Pelvic Medicine and Reconstructive Surgery  [facility] [place], [place]

## 2017-12-21 LAB
BACTERIA UR CULT: NO GROWTH
BILIRUB SERPL-MCNC: NORMAL MG/DL
BLOOD URINE, POC: NORMAL
COLOR, POC UA: YELLOW
GLUCOSE UR QL STRIP: NORMAL
KETONES UR QL STRIP: NORMAL
LEUKOCYTE ESTERASE URINE, POC: NORMAL
NITRITE, POC UA: NORMAL
PH, POC UA: 5
PROTEIN, POC: NORMAL
SPECIFIC GRAVITY, POC UA: 1.01
UROBILINOGEN, POC UA: NORMAL

## 2018-01-02 ENCOUNTER — PATIENT MESSAGE (OUTPATIENT)
Dept: UROGYNECOLOGY | Facility: CLINIC | Age: 73
End: 2018-01-02

## 2018-01-29 ENCOUNTER — OFFICE VISIT (OUTPATIENT)
Dept: UROGYNECOLOGY | Facility: CLINIC | Age: 73
End: 2018-01-29
Payer: COMMERCIAL

## 2018-01-29 VITALS
WEIGHT: 156.06 LBS | HEIGHT: 64 IN | BODY MASS INDEX: 26.64 KG/M2 | SYSTOLIC BLOOD PRESSURE: 100 MMHG | DIASTOLIC BLOOD PRESSURE: 64 MMHG

## 2018-01-29 DIAGNOSIS — N81.6 POSTERIOR VAGINAL WALL PROLAPSE: ICD-10-CM

## 2018-01-29 DIAGNOSIS — N81.10 PROLAPSE OF ANTERIOR VAGINAL WALL: ICD-10-CM

## 2018-01-29 DIAGNOSIS — N39.3 SUI (STRESS URINARY INCONTINENCE, FEMALE): ICD-10-CM

## 2018-01-29 DIAGNOSIS — N81.2 UTEROVAGINAL PROLAPSE, INCOMPLETE: Primary | ICD-10-CM

## 2018-01-29 PROCEDURE — 99214 OFFICE O/P EST MOD 30 MIN: CPT | Mod: S$GLB,,, | Performed by: OBSTETRICS & GYNECOLOGY

## 2018-01-29 PROCEDURE — 1125F AMNT PAIN NOTED PAIN PRSNT: CPT | Mod: S$GLB,,, | Performed by: OBSTETRICS & GYNECOLOGY

## 2018-01-29 PROCEDURE — 3008F BODY MASS INDEX DOCD: CPT | Mod: S$GLB,,, | Performed by: OBSTETRICS & GYNECOLOGY

## 2018-01-29 PROCEDURE — 99999 PR PBB SHADOW E&M-EST. PATIENT-LVL II: CPT | Mod: PBBFAC,,, | Performed by: OBSTETRICS & GYNECOLOGY

## 2018-01-29 PROCEDURE — 1159F MED LIST DOCD IN RCRD: CPT | Mod: S$GLB,,, | Performed by: OBSTETRICS & GYNECOLOGY

## 2018-01-29 NOTE — PROGRESS NOTES
Subjective:       Patient ID: Sury Gao is a 72 y.o. female.    Chief Complaint:  Vaginal Prolapse (follow up)      History of Present Illness 72 Y O F P3 with bothersome one vaginal bulge and pelvic pressure noted while she was exercising. Patient has also noted urinary incontinence associated with urgency. She was referred for evaluation by Dr. Zavala initially for evaluation and presents for follow up. At the initial evalaution we discussed non-surgical options, including the pessary which she declined. She presents today to further discuss surgical options. She is concerned that the prolapse may have worsened.     HPI [facility] P[facility] Urogyn Hpi     Question 12/20/2017  2:12 PM CST   General Urogynecology: Are you experiencing the following?    Dysuria (painful urination) No   Nocturia:  waking up at night to empty your bladder  Yes more bothersome   If you answered yes to the previous question, how many times does this happen per night? 1-2   Enuresis (urine loss during sleep) No   Dribbling urine after you urinate Yes   Hematuria (urine appears red) No   Type of stream Interrupted, recent    Urinary frequency: How often a day are you going to the bathroom per day?  10-15   Urinary Tract Infections: How many Urinary Tract Infections have you had in the past year? One (1) in the past year   If you have had a UTI in the past year, what treatments have you had so far?  I have not had a UTI in the past year   Urinary Incontinence (General): Are you experiencing the following?    Past consultation for incontinence: Have you ever seen someone for the evaluation of incontinence? No   If you answered yes to the previous question, please select all the therapies you have tried.  N/a- I answered no to the previous question   Please note the effectiveness of the therapies.    Need to wear protection to keep clothes dry  No   If you answered yes to the previous question, please yodit the protection you use.  N/a- I  "answered no to the previous question   If you wear protection, how much wetness is typically on each pad? N/a- I do not wear protection   If you wear protection, how often do you have to change per day, if applicable?     Stress Symptoms: Are you experiencing the following?    Leakage of urine with cough, laugh and/or sneeze Yes   If you answered yes to the previous question, what is the frequency in days, weeks and/or months? Several times a month   Leakage of urine with sex No   Leakage of urine with bending/ lifting No   Leakage of urine with briskly walking or jogging Yes   If you lose urine for any other reason not previously mentioned, please note it below, if applicable.     Urge Symptoms: Are you experiencing the following?    Urgency ("got to go" feeling) yes   Urge: How frequently do you feel an urge to urinate (feeling like you "gotta go" to the bathroom and can't wait) Weekly   Do you experience a leakage of urine when you have a feeling of urgency?  Yes   Leakage of urine when unaware No   Past use of anticholinergics (medications used to treat overactive bladder) No   If you answered yes to the previous question, please yodit the anticholinergics you have used:     Have you ever used Mirbetriq (aka Mirabegron)?  No   Prolapse Symptoms: Are you experiencing any of the following?     Falling out/ Bulging/ Heaviness in the vagina Yes   Vaginal/ Abdominal Pain/ Pressure Yes   Need to strain/ Push to void No   Need to wait on the toilet before you void No   Unusual position to urinate (using your hands to push back the vaginal bulge) No   Sensation of incomplete emptying Yes, new since the prolapse    Past use of pessary device No   If you answered yes to the previous question, please list the devices you have used below.     Bowel Symptoms: Are you experiencing any of the following?    Constipation No   Diarrhea  No   Hematochezia (bloody stool) No   Incomplete evacuation of stool No   Involuntary loss of " formed stool No   Fecal smearing/urgency No   Involuntary loss of gas No   Vaginal Symptoms: Are you experiencing any of the following?     Abnormal vaginal bleeding  No   Vaginal dryness No   Sexually active  No   Dyspareunia (painful intercourse) No   Estrogen use  No     GYN & OB History  No LMP recorded. Patient is postmenopausal.   Date of Last Pap: 3/23/2017    OB History    Para Term  AB Living   4 3     1 3   SAB TAB Ectopic Multiple Live Births   1       3      # Outcome Date GA Lbr Bertin/2nd Weight Sex Delivery Anes PTL Lv   4 SAB            3 Para            2 Para            1 Para                  VDX 3   Largest: 9#   Forceps: No  3rd  Or 4th degree : ?     Past Medical History:   Diagnosis Date    Hypothyroidism     Nuclear sclerosis 2014    Thyroid disease     hypothyroid     Past Surgical History:   Procedure Laterality Date    Bilateral lower extremity varicose vein treatment       gutierrez neuroma      left foot    WRIST SURGERY      left wrist: Swave-Kapunjay surgery   D&C - 45 years ago     Review of patient's allergies indicates:  No Known Allergies      Current Outpatient Prescriptions:     fexofenadine (ALLEGRA) 30 MG tablet, Take 30 mg by mouth continuous prn., Disp: , Rfl:     loratadine (CLARITIN) 10 mg tablet, Take 10 mg by mouth daily as needed., Disp: , Rfl:     SYNTHROID 75 mcg tablet, TAKE 1 TABLET BY MOUTH DAILY, Disp: 90 tablet, Rfl: 3    Review of Systems  Review of Systems   Constitutional: Negative.  Negative for activity change, appetite change, chills, diaphoresis, fatigue, fever and unexpected weight change.   HENT: Negative.    Eyes: Negative.    Respiratory: Negative.  Negative for cough.    Cardiovascular: Negative.    Gastrointestinal: Negative for abdominal distention, abdominal pain, anal bleeding, blood in stool, constipation, diarrhea, nausea, rectal pain and vomiting.   Endocrine: Negative.    Genitourinary: Positive for urgency. Negative for  decreased urine volume, dyspareunia, dysuria, enuresis, flank pain, frequency, genital sores, hematuria, menstrual problem, pelvic pain, vaginal bleeding, vaginal discharge and vaginal pain.        Prolapse  + vaginal bulge   +vaginal pressure    Musculoskeletal: Negative for back pain.   Skin: Negative for color change, pallor, rash and wound.   Allergic/Immunologic: Negative for environmental allergies, food allergies and immunocompromised state.   Hematological: Negative for adenopathy. Does not bruise/bleed easily.   Psychiatric/Behavioral: Negative for agitation, behavioral problems, confusion and sleep disturbance.           Objective:     Physical Exam   Constitutional: She is oriented to person, place, and time. She appears well-developed.   HENT:   Head: Normocephalic and atraumatic.   Eyes: Conjunctivae and EOM are normal.   Neck: Normal range of motion. Neck supple.   Cardiovascular: Normal rate, regular rhythm, S1 normal, S2 normal, normal heart sounds and intact distal pulses.    Pulmonary/Chest: Effort normal and breath sounds normal. She exhibits no tenderness.   Abdominal: Soft. Bowel sounds are normal. She exhibits no distension and no mass. There is no hepatosplenomegaly, splenomegaly or hepatomegaly. There is no tenderness. There is no rigidity, no rebound, no guarding and no CVA tenderness. No hernia.   Genitourinary: Pelvic exam was performed with patient supine and standing. Rectum normal, vagina normal, uterus normal, cervix normal, skenes normal and bartholins normal. Right labia normal and left labia normal. Urethra exhibits hypermobility. Urethra exhibits no urethral caruncle, no urethral diverticulum and no urethral mass. Right bartholin is not enlarged and not tender. Left bartholin is not enlarged and not tender. Rectal exam shows resting tone normal and active tone normal. Rectal exam shows no external hemorrhoid, no fissure, no tenderness, anal tone normal and no dovetailing. Guaiac  negative stool. There is a cystocele, unspecified prolapse of vaginal walls and atrophy in the vagina. No foreign body, tenderness, bleeding, fistula, mesh exposure or lavator tenderness in the vagina. No vaginal discharge found. Right adnexum displays no mass and no tenderness. Left adnexum displays no mass and no tenderness. Cervix exhibits no motion tenderness and no discharge. Uterus is experiencing uterine prolapse. Uterus is not tender.   Empty cough stress test: Negative.  Kegel: 3/5    POP-Q  Aa: 2 Ba: 2 C: 0   GH: 4 PB: 2 TVL: 9   Ap: -1 Bp: -1 D: -4                     Musculoskeletal: Normal range of motion.   Lymphadenopathy:     She has no axillary adenopathy.        Right: No inguinal adenopathy present.        Left: No inguinal adenopathy present.   Neurological: She is alert and oriented to person, place, and time. She has normal strength and normal reflexes. Cranial nerves II through XII intact. No cranial nerve deficit.   Skin: Skin is warm, dry and intact.   Psychiatric: She has a normal mood and affect. Her speech is normal and behavior is normal. Judgment normal. Cognition and memory are normal.          Assessment:        1. Uterovaginal prolapse, incomplete    2. VLADIMIR (stress urinary incontinence, female)    3. Prolapse of anterior vaginal wall    4. Posterior vaginal wall prolapse               Plan:        1. Prolapse: Stage 2  apical prolapse, Stage 2 anterior and posterior vaginal wall prolapse   --Pessary benefits discussed   --Daily pelvic floor exercises as assigned, consider Pelvic floor PT in future  --Non-surgical options discussed with patient    --Surgical options discussed such as apical suspension SSF vs USLS vs  SCP with mesh augmentation. We reviewed the pro's and con's of a hysterectomy rationale for total vs supracervical. We also discussed the options of hysteropexy. We reviewed the long term risks of mesh augmentation.  She would like to proceed with a hysterectomy,  will  have hysterectomy with Dr. Zavala and I will suspend the apex via SCP with mesh augmentation. SUDS prior to surgery     2.  Mixed urinary incontinence, urge > stress:   --Bladder diary for 3-7 days, write the number of times you go to the rest room and associated symptoms of urgency or leakage.   --Empty bladder every 3 hours.  Empty well: wait a minute, lean forward on toilet.    --Avoid dietary irritants (see sheet).  Keep diary x 3-5 days to determine your irritants.  --KEGELS: do 10 in AM and 10 in PM, holding each x 10 seconds.  When you feel urge to go, STOP, KEGEL, and when urge has passed, then go to bathroom.  Consider PT in future.     --STRESS:  Pessary vs. Sling vs SUDS     Approximately  30 min were spent in consult, 90 % in discussion.     Maxx Castellanos DO  Female Pelvic Medicine and Reconstructive Surgery  [facility] [place], [place]

## 2018-02-06 ENCOUNTER — PROCEDURE VISIT (OUTPATIENT)
Dept: UROGYNECOLOGY | Facility: CLINIC | Age: 73
End: 2018-02-06
Payer: COMMERCIAL

## 2018-02-06 VITALS
SYSTOLIC BLOOD PRESSURE: 120 MMHG | BODY MASS INDEX: 26.39 KG/M2 | DIASTOLIC BLOOD PRESSURE: 80 MMHG | WEIGHT: 154.56 LBS | HEIGHT: 64 IN

## 2018-02-06 DIAGNOSIS — N39.3 SUI (STRESS URINARY INCONTINENCE, FEMALE): ICD-10-CM

## 2018-02-06 LAB
BILIRUB SERPL-MCNC: NORMAL MG/DL
BLOOD URINE, POC: NORMAL
COLOR, POC UA: NORMAL
GLUCOSE UR QL STRIP: NORMAL
KETONES UR QL STRIP: NORMAL
LEUKOCYTE ESTERASE URINE, POC: NORMAL
NITRITE, POC UA: NORMAL
PH, POC UA: 7
PROTEIN, POC: NORMAL
SPECIFIC GRAVITY, POC UA: 1
UROBILINOGEN, POC UA: NORMAL

## 2018-02-06 PROCEDURE — 81002 URINALYSIS NONAUTO W/O SCOPE: CPT | Mod: S$GLB,,, | Performed by: OBSTETRICS & GYNECOLOGY

## 2018-02-06 PROCEDURE — 51784 ANAL/URINARY MUSCLE STUDY: CPT | Mod: 51,S$GLB,, | Performed by: OBSTETRICS & GYNECOLOGY

## 2018-02-06 PROCEDURE — 51728 CYSTOMETROGRAM W/VP: CPT | Mod: S$GLB,,, | Performed by: OBSTETRICS & GYNECOLOGY

## 2018-02-06 PROCEDURE — 51797 INTRAABDOMINAL PRESSURE TEST: CPT | Mod: S$GLB,,, | Performed by: OBSTETRICS & GYNECOLOGY

## 2018-02-06 PROCEDURE — 52000 CYSTOURETHROSCOPY: CPT | Mod: 59,S$GLB,, | Performed by: OBSTETRICS & GYNECOLOGY

## 2018-02-06 PROCEDURE — 51741 ELECTRO-UROFLOWMETRY FIRST: CPT | Mod: 51,S$GLB,, | Performed by: OBSTETRICS & GYNECOLOGY

## 2018-02-06 RX ORDER — LIDOCAINE HYDROCHLORIDE 20 MG/ML
JELLY TOPICAL ONCE
Status: COMPLETED | OUTPATIENT
Start: 2018-02-06 | End: 2018-02-06

## 2018-02-06 RX ORDER — SULFAMETHOXAZOLE AND TRIMETHOPRIM 800; 160 MG/1; MG/1
1 TABLET ORAL
Status: COMPLETED | OUTPATIENT
Start: 2018-02-06 | End: 2018-02-06

## 2018-02-06 RX ADMIN — LIDOCAINE HYDROCHLORIDE 5 ML: 20 JELLY TOPICAL at 11:02

## 2018-02-06 RX ADMIN — SULFAMETHOXAZOLE AND TRIMETHOPRIM 1 TABLET: 800; 160 TABLET ORAL at 11:02

## 2018-02-06 NOTE — PROCEDURES
Procedures     TITLE OF OPERATION:  1. Complex cystometry.  2. Complex uroflowmetry.  3. Electromyography with surface electrodes.  4. Pressure voiding flow study.  5. Abdominal pressure measurement.  6. Leak point pressure measurement.    INDICATIONS:  72 Y O F P3 with bothersome one vaginal bulge and pelvic pressure noted while she was exercising. Patient has also noted urinary incontinence associated with urgency. Found to have uterovaginal prolapse, declined pessary and requesting surgical intervention. She was referred for evaluation by Dr. Zavala.  Wants to proceed with apical suspension with SCP; with possible anterior /posterior vaginal wall repair.      PREOPERATIVE DIAGNOSIS:  1. Urinary urgency   2. Pelvic pressure     POSTOPERATIVE DIAGNOSIS:  1. Urinary urgency   2. Pelvic pressure     ANESTHESIA:  None.    SPECIMEN (BACTERIOLOGICAL, PATHOLOGICAL OR OTHER):  None.    PROSTHETIC DEVICE/IMPLANT:  None.    SURGEONS NARRATIVE:  A time out was performed in which the patient identity and procedure were confirmed.  Urodynamic evaluation was performed using a computerized system ([employer]  Uroflowmetry was performed on the patient in the sitting position without catheters in place.  Subsequent urodynamic testing was performed with the patient in the lithotomy position at 45 degrees. Air charged catheters were used with sterile water as the infusion medium. Vesical and abdominal (rectal) pressures were measured, and detrusor pressure was calculated. EMG activity was recorded with surface electrodes. During filling, room temperature sterile water was infused at a rate of 30 cubic centimeters per minute. The patient was asked cough after instillation of each 100cc volume. Two Valsalva leak point pressures and two cough leak point pressures were performed with the catheters in place at 300 cubic centimeters and again at maximum capacity. Valsalva leak point pressure was defined as the  difference between vesical pressure at which leakage was noted (visualized at the external urethral meatus) and the baseline vesical pressure. Following urodynamic testing, a pressure flow study was performed with the patient in the sitting position. Vesical and abdominal pressures were monitored and detrusor pressures were calculated. After the pressure flow study, the catheters were then removed. The patient tolerated the procedure well.     Urine dipstick: normal .    1.  VOIDING PHASE:      a.  Uroflowmetry:   Prolapse reduction: No   Voided volume:  297 mL    Voiding time:   17 seconds   Max flow:  31 mL/s   Avg flow:   16 mL/s    PVR:   25 mL    The overall configuration of this uroflow study was  Normal .      b.  Pressure flow:   Prolapse reduction: No   Voided volume:   566 mL   Voiding time:   62 seconds   Peak flow:  30 mL/s    Avg flow:  11.7 mL/s   Max det pressure:  40  cm H20   Det pressure at max flow: n/a cm H20   Void initiated by  permission.     Urethral relaxation (EMG):  normal .     PVR (calculated):  0  mL    The overall configuration of this pressure flow study was normal flow.      2.  FILLING PHASE:   1st desire: 82 mL   Normal desire:  131 mL   Strong desire:  255 mL   Urgency:  462 mL   Compliance (calculated)  25.1 mL/cm H20   EMG activity during filling:  Normal    Detrusor contractions observed: No    3.  URETHRAL FUNCTION/STORAGE PHASE:    a.  WITHOUT prolapse reduction:   CLPP (150 mL): Negative  at  72 cm H20   VLPP (150 mL): Negative  at  73 cm H20    CLPP (MAX ):    Negative  at  61 cm H20   VLPP (MAX):     Negative  at  63 cm H20    These findings are consistent with Negative urodynamic stress incontinence.    Assessment:  UF:  No incontinence PF  Prolapse.  Compliance  Normal .  Max capacity  Normal .  DO (--).  VLADIMIR (--).      Plan:  No need for a Mid-urethral sling at the time of surgery.     Date: 2/6/2018    Title of Operation:   Cystourethroscopy.      INDICATIONS:  72 Y O F P3 with bothersome one vaginal bulge and pelvic pressure noted while she was exercising. Patient has also noted urinary incontinence associated with urgency. She was referred for evaluation by Dr. Zavala.      PREOPERATIVE DIAGNOSIS  1. Urinary urgency   2. Pelvic pressure    POSTOPERATIVE DIAGNOSIS:   1. Urinary urgency   2. Pelvic pressure    Anesthesia:   2% Xylocaine gel.    Specimen (Bacteriological, Pathological or other):   None.     Prosthetic Device/Implant:   None.     Surgeons Narrative:     After informed consent was obtained, the patient was placed in the lithotomy position. The urethral meatus was prepped with Betadine and 10 cubic centimeters of 2% Xylocaine gel were introduced into the urethra. A flexible cystourethroscope was introduced into the bladder. The bladder was distended with approximately 300 cubic centimeters of sterile water. A systematic survey was performed in which the bladder was surveyed using multiple sequential passes in a clockwise fashion from the bladder dome to the bladder base to the urethrovesical junction. The trigone and ureteral orifices were observed. The scope was then flipped back on itself, and the urethrovesical junction was viewed. A vaginal examining finger was then placed with pressure suburethrally at the urethrovesical junction as the telescope was withdrawn in order to perform positive pressure urethroscopy.  Standard maneuvers of cough, squeeze and Valsalva were performed. The telescope was then completely withdrawn.     Findings: Urethroscopy:  Normal.  Cystoscopy:  Normal bladder mucosa, bilateral ureteral flow was noted.      Assessment: Essentially normal cystourethroscopy.      Plan: The patient will follow up with Dr. Castellanos as scheduled.  See urodynamics note from for further plan details.

## 2018-02-20 ENCOUNTER — TELEPHONE (OUTPATIENT)
Dept: UROGYNECOLOGY | Facility: CLINIC | Age: 73
End: 2018-02-20

## 2018-02-21 NOTE — TELEPHONE ENCOUNTER
Will plan on OR in April. Will consult with Dr. Castellanos and Dr. Zavala on possible dates.  ELIZABETH Carlisle-BC

## 2018-02-23 ENCOUNTER — HOSPITAL ENCOUNTER (OUTPATIENT)
Facility: HOSPITAL | Age: 73
Discharge: HOME | End: 2018-02-23
Attending: COLON & RECTAL SURGERY | Admitting: COLON & RECTAL SURGERY
Payer: COMMERCIAL

## 2018-02-23 ENCOUNTER — SURGERY (OUTPATIENT)
Age: 73
End: 2018-02-23

## 2018-02-23 ENCOUNTER — ANESTHESIA EVENT (OUTPATIENT)
Dept: ENDOSCOPY | Facility: HOSPITAL | Age: 73
End: 2018-02-23
Payer: COMMERCIAL

## 2018-02-23 ENCOUNTER — ANESTHESIA (OUTPATIENT)
Dept: ENDOSCOPY | Facility: HOSPITAL | Age: 73
End: 2018-02-23
Payer: COMMERCIAL

## 2018-02-23 VITALS
HEIGHT: 64 IN | HEART RATE: 66 BPM | DIASTOLIC BLOOD PRESSURE: 73 MMHG | BODY MASS INDEX: 25.61 KG/M2 | WEIGHT: 150 LBS | TEMPERATURE: 99 F | RESPIRATION RATE: 12 BRPM | SYSTOLIC BLOOD PRESSURE: 133 MMHG | OXYGEN SATURATION: 96 %

## 2018-02-23 DIAGNOSIS — Z12.11 SCREENING FOR COLON CANCER: ICD-10-CM

## 2018-02-23 PROCEDURE — 45380 COLONOSCOPY AND BIOPSY: CPT | Performed by: COLON & RECTAL SURGERY

## 2018-02-23 PROCEDURE — D9220A PRA ANESTHESIA: Mod: 33,CRNA,, | Performed by: NURSE ANESTHETIST, CERTIFIED REGISTERED

## 2018-02-23 PROCEDURE — 45380 COLONOSCOPY AND BIOPSY: CPT | Mod: 33,,, | Performed by: COLON & RECTAL SURGERY

## 2018-02-23 PROCEDURE — 37000009 HC ANESTHESIA EA ADD 15 MINS: Performed by: COLON & RECTAL SURGERY

## 2018-02-23 PROCEDURE — 63600175 PHARM REV CODE 636 W HCPCS: Performed by: NURSE ANESTHETIST, CERTIFIED REGISTERED

## 2018-02-23 PROCEDURE — 88305 TISSUE EXAM BY PATHOLOGIST: CPT | Mod: 26,,, | Performed by: PATHOLOGY

## 2018-02-23 PROCEDURE — D9220A PRA ANESTHESIA: Mod: 33,ANES,, | Performed by: ANESTHESIOLOGY

## 2018-02-23 PROCEDURE — 25000003 PHARM REV CODE 250: Performed by: NURSE PRACTITIONER

## 2018-02-23 PROCEDURE — 27201012 HC FORCEPS, HOT/COLD, DISP: Performed by: COLON & RECTAL SURGERY

## 2018-02-23 PROCEDURE — 37000008 HC ANESTHESIA 1ST 15 MINUTES: Performed by: COLON & RECTAL SURGERY

## 2018-02-23 PROCEDURE — 88305 TISSUE EXAM BY PATHOLOGIST: CPT | Performed by: PATHOLOGY

## 2018-02-23 RX ORDER — SODIUM CHLORIDE 9 MG/ML
INJECTION, SOLUTION INTRAVENOUS CONTINUOUS
Status: DISCONTINUED | OUTPATIENT
Start: 2018-02-23 | End: 2018-02-23 | Stop reason: HOSPADM

## 2018-02-23 RX ORDER — LIDOCAINE HCL/PF 100 MG/5ML
SYRINGE (ML) INTRAVENOUS
Status: DISCONTINUED | OUTPATIENT
Start: 2018-02-23 | End: 2018-02-23

## 2018-02-23 RX ORDER — PROPOFOL 10 MG/ML
VIAL (ML) INTRAVENOUS
Status: DISCONTINUED | OUTPATIENT
Start: 2018-02-23 | End: 2018-02-23

## 2018-02-23 RX ORDER — PROPOFOL 10 MG/ML
VIAL (ML) INTRAVENOUS CONTINUOUS PRN
Status: DISCONTINUED | OUTPATIENT
Start: 2018-02-23 | End: 2018-02-23

## 2018-02-23 RX ADMIN — SODIUM CHLORIDE: 9 INJECTION, SOLUTION INTRAVENOUS at 11:02

## 2018-02-23 RX ADMIN — LIDOCAINE HYDROCHLORIDE 50 MG: 20 INJECTION, SOLUTION INTRAVENOUS at 12:02

## 2018-02-23 RX ADMIN — PROPOFOL 150 MCG/KG/MIN: 10 INJECTION, EMULSION INTRAVENOUS at 12:02

## 2018-02-23 RX ADMIN — PROPOFOL 50 MG: 10 INJECTION, EMULSION INTRAVENOUS at 12:02

## 2018-02-23 NOTE — H&P
Procedure : Colonoscopy    Indication(s):  asymptomatic screening exam    Review of patient's allergies indicates:  No Known Allergies    Past Medical History:   Diagnosis Date    Hypothyroidism     Nuclear sclerosis 4/23/2014    Thyroid disease     hypothyroid       Prior to Admission medications    Medication Sig Start Date End Date Taking? Authorizing Provider   fexofenadine (ALLEGRA) 30 MG tablet Take 30 mg by mouth continuous prn.   Yes Historical Provider, MD   loratadine (CLARITIN) 10 mg tablet Take 10 mg by mouth daily as needed.   Yes Historical Provider, MD   SYNTHROID 75 mcg tablet TAKE 1 TABLET BY MOUTH DAILY 9/30/17  Yes Aaron Ann MD       Sedation Problems: NO    Family History   Problem Relation Age of Onset    Hypothyroidism Mother     Cataracts Mother     Heart disease Mother      Valvular heart disease    Cataracts Father     Macular degeneration Father     Hypothyroidism Sister     Hypothyroidism Daughter     Cancer Brother      testicular cancer    Amblyopia Neg Hx     Blindness Neg Hx     Glaucoma Neg Hx     Retinal detachment Neg Hx     Strabismus Neg Hx     Breast cancer Neg Hx     Colon cancer Neg Hx     Diabetes Neg Hx     Hypertension Neg Hx     Ovarian cancer Neg Hx     Cervical cancer Neg Hx     Endometrial cancer Neg Hx     Vaginal cancer Neg Hx        Fam Hx of Sedation Problems: NO    Social History     Social History    Marital status:      Spouse name: N/A    Number of children: N/A    Years of education: N/A     Occupational History    Not on file.     Social History Main Topics    Smoking status: Former Smoker    Smokeless tobacco: Never Used    Alcohol use Yes      Comment: social    Drug use: No    Sexual activity: Not Currently     Other Topics Concern    Not on file     Social History Narrative    No narrative on file       Review of Systems -     Respiratory ROS: no cough, shortness of breath, or wheezing  Cardiovascular ROS:  no chest pain or dyspnea on exertion  Gastrointestinal ROS: no abdominal pain, change in bowel habits, or black or bloody stools  Musculoskeletal ROS: negative  Neurological ROS: no TIA or stroke symptoms        Physical Exam:  General: no distress  Head: normocephalic  Airway:  normal oropharynx, airway normal  Neck: supple, symmetrical, trachea midline  Lungs:  normal respiratory effort  Heart: regular rate and rhythm  Abdomen: soft, non-tender non-distented; bowel sounds normal; no masses,  no organomegaly  Extremities: no cyanosis or edema, or clubbing       Deep Sedation: Mallampati Score per anesthesia     SedationPlan :Moderate     ASA : II    Patient is medically cleared for anesthesia.    Anesthesia/Surgery risks, benefits and alternative options discussed and understood by patient/family.

## 2018-02-23 NOTE — H&P
Endoscopy H&P    Procedure : Colonoscopy      asymptomatic screening exam      Past Medical History:   Diagnosis Date    Hypothyroidism     Nuclear sclerosis 4/23/2014    Thyroid disease     hypothyroid             Review of Systems -ROS:  GENERAL: No fever, chills, fatigability or weight loss.  CHEST: Denies GILL, cyanosis, wheezing, cough and sputum production.  CARDIOVASCULAR: Denies chest pain, PND, orthopnea or reduced exercise tolerance.   Musculoskeletal ROS: negative for - gait disturbance or joint pain  Neurological ROS: negative for - confusion or memory loss        Physical Exam:  General: well developed, well nourished, no distress  Head: normocephalic  Neck: supple, symmetrical, trachea midline  Lungs:  clear to auscultation bilaterally and normal respiratory effort  Heart: regular rate and rhythm, S1, S2 normal, no murmur, rub or gallop and regular rate and rhythm  Abdomen: soft, non-tender non-distented; bowel sounds normal; no masses,  no organomegaly  Extremities: no cyanosis or edema, or clubbing       Moderate Sedation (choice): Mallampati Score 1    ASA : II    IMP: asymptomatic screening exam    Plan: Colonoscopy with Moderate sedation.  I have explained the procedure including indications, alternatives, expected outcomes and potential complications. The patient appears to understand and gives informed consent. The patient is medically ready for surgery.

## 2018-02-23 NOTE — PROVATION PATIENT INSTRUCTIONS
Discharge Summary/Instructions after an Endoscopic Procedure  Patient Name: Sury Gao  Patient MRN: [id number]  Patient YOB: 1945 Friday, February 23, 2018  Emiliano Israel MD  RESTRICTIONS:  During your procedure today, you received medications for sedation.  These   medications may affect your judgment, balance and coordination.  Therefore,   for 24 hours, you have the following restrictions:   - DO NOT drive a car, operate machinery, make legal/financial decisions,   sign important papers or drink alcohol.    ACTIVITY:  The following day: return to full activity including work, except no heavy   lifting, straining or running for 3 days if polyps were removed.  DIET:  Eat and drink normally unless instructed otherwise.     TREATMENT FOR COMMON SIDE EFFECTS:  - Mild abdominal pain, nausea, belching, bloating or excessive gas:  rest,   eat lightly and use a heating pad.  - Sore Throat: treat with throat lozenges and/or gargle with warm salt   water.  - Because air was used during the procedure, expelling large amounts of air   from your rectum or belching is normal.  - If a bowel prep was taken, you may not have a bowel movement for 1-3 days.    This is normal.  SYMPTOMS TO WATCH FOR AND REPORT TO YOUR PHYSICIAN:  1. Abdominal pain or bloating, other than gas cramps.  2. Chest pain.  3. Back pain.  4. Signs of infection such as: chills or fever occurring within 24 hours   after the procedure.  5. Rectal bleeding, which would show as bright red, maroon, or black stools.   (A tablespoon of blood from the rectum is not serious, especially if   hemorrhoids are present.)  6. Vomiting.  7. Weakness or dizziness.  GO DIRECTLY TO THE NEAREST EMERGENCY ROOM IF YOU HAVE ANY OF THE FOLLOWING:      Difficulty breathing  Chills and/or fever over 101 F   Persistent vomiting and/or vomiting blood   Severe abdominal pain   Severe chest pain   Black, tarry stools   Bleeding- more than one tablespoon   Any other  symptom or condition that you feel may need urgent attention  Your doctor recommends these additional instructions:  If any biopsies were taken, your doctors clinic will contact you in 1 to 2   weeks with any results.  You are being discharged to home.   Eat a high fiber diet.   Continue your present medications.   We are waiting for your pathology results.   Your physician has recommended a repeat colonoscopy (date to be determined   after pending pathology results are reviewed) for surveillance based on   pathology results.   You have a contact number available for emergencies.  The signs and symptoms   of potential delayed complications were discussed with you.  You may return   to normal activities tomorrow.  Written discharge instructions were   provided to you.  For questions, problems or results please call your physician - Emiliano Israel MD at Work:  [phone], Work:  [phone].  [facility], EMERGENCY ROOM PHONE NUMBER: [phone]  IF A COMPLICATION OR EMERGENCY SITUATION ARISES AND YOU ARE UNABLE TO REACH   YOUR PHYSICIAN - GO DIRECTLY TO THE EMERGENCY ROOM.  Emiliano Israel MD  2/23/2018 1:15:23 PM  This report has been verified and signed electronically.

## 2018-02-23 NOTE — TRANSFER OF CARE
"Anesthesia Transfer of Care Note    Patient: Sury Gao    Procedure(s) Performed: Procedure(s) (LRB):  COLONOSCOPY (N/A)    Patient location: PACU    Anesthesia Type: general    Transport from OR: Transported from OR on room air with adequate spontaneous ventilation    Post pain: adequate analgesia    Post assessment: no apparent anesthetic complications    Post vital signs: stable    Level of consciousness: awake and alert    Nausea/Vomiting: no nausea/vomiting    Complications: none    Transfer of care protocol was followed      Last vitals:   Visit Vitals  /70 (Patient Position: Lying)   Pulse (!) 58   Resp 14   Ht 5' 4" (1.626 m)   Wt 68 kg (150 lb)   SpO2 99%   Breastfeeding? No   BMI 25.75 kg/m²     "

## 2018-02-23 NOTE — ANESTHESIA PREPROCEDURE EVALUATION
02/23/2018  Sury Gao is a 72 y.o., female.    Anesthesia Evaluation    I have reviewed the Patient Summary Reports.    I have reviewed the Nursing Notes.   I have reviewed the Medications.     Review of Systems  Anesthesia Hx:  No problems with previous Anesthesia  History of prior surgery of interest to airway management or planning: Previous anesthesia: General Denies Family Hx of Anesthesia complications.   Denies Personal Hx of Anesthesia complications.   Social:  Non-Smoker    Hematology/Oncology:  Hematology Normal   Oncology Normal     EENT/Dental:EENT/Dental Normal   Cardiovascular:  Cardiovascular Normal Exercise tolerance: good     Pulmonary:  Pulmonary Normal    Renal/:  Renal/ Normal     Hepatic/GI:  Hepatic/GI Normal    Musculoskeletal:  Musculoskeletal Normal    Neurological:  Neurology Normal    Endocrine:   Hypothyroidism    Dermatological:  Skin Normal    Psych:  Psychiatric Normal           Physical Exam  General:  Well nourished    Airway/Jaw/Neck:  Airway Findings: Mouth Opening: Normal Tongue: Normal  General Airway Assessment: Adult, Average  Mallampati: II  Improves to II with phonation.  TM Distance: Normal, at least 6 cm        Eyes/Ears/Nose:  EYES/EARS/NOSE FINDINGS: Normal   Dental:  Dental Findings: In tact, molar caps   Chest/Lungs:  Chest/Lungs Findings: Clear to auscultation, Normal Respiratory Rate     Heart/Vascular:  Heart Findings: Rate: Normal  Rhythm: Regular Rhythm  Sounds: Normal  Heart murmur: negative Vascular Findings: Normal    Abdomen:  Abdomen Findings: Normal    Musculoskeletal:  Musculoskeletal Findings: Normal   Skin:  Skin Findings: Normal    Mental Status:  Mental Status Findings:  Cooperative, Alert and Oriented         Anesthesia Plan  Type of Anesthesia, risks & benefits discussed:  Anesthesia Type:  general  Patient's Preference:    Intra-op Monitoring Plan: standard ASA monitors  Intra-op Monitoring Plan Comments:   Post Op Pain Control Plan:   Post Op Pain Control Plan Comments:   Induction:   IV  Beta Blocker:  Patient is not currently on a Beta-Blocker (No further documentation required).       Informed Consent: Patient understands risks and agrees with Anesthesia plan.  Questions answered. Anesthesia consent signed with patient.  ASA Score: 2     Day of Surgery Review of History & Physical:            Ready For Surgery From Anesthesia Perspective.

## 2018-02-26 ENCOUNTER — TELEPHONE (OUTPATIENT)
Dept: OBSTETRICS AND GYNECOLOGY | Facility: CLINIC | Age: 73
End: 2018-02-26

## 2018-02-26 ENCOUNTER — TELEPHONE (OUTPATIENT)
Dept: UROGYNECOLOGY | Facility: CLINIC | Age: 73
End: 2018-02-26

## 2018-02-26 NOTE — ANESTHESIA POSTPROCEDURE EVALUATION
"Anesthesia Post Evaluation    Patient: Sury Gao    Procedure(s) Performed: Procedure(s) (LRB):  COLONOSCOPY (N/A)    Final Anesthesia Type: general  Patient location during evaluation: GI PACU  Patient participation: Yes- Able to Participate  Level of consciousness: awake and alert and oriented  Post-procedure vital signs: reviewed and stable  Pain management: adequate  Airway patency: patent  PONV status at discharge: No PONV  Anesthetic complications: no      Cardiovascular status: hemodynamically stable  Respiratory status: unassisted, spontaneous ventilation and room air  Hydration status: euvolemic  Follow-up not needed.        Visit Vitals  /73 (BP Location: Left arm, Patient Position: Lying)   Pulse 66   Temp 37.1 °C (98.7 °F)   Resp 12   Ht 5' 4" (1.626 m)   Wt 68 kg (150 lb)   SpO2 96%   Breastfeeding? No   BMI 25.75 kg/m²       Pain/Kayla Score: No Data Recorded      "

## 2018-02-26 NOTE — TELEPHONE ENCOUNTER
Patient called requesting a surgery date for hysterectomy vs Uterovaginal Prolapse with urogyn-Dr Robert. Scheduled consult 03/01/2018

## 2018-02-26 NOTE — TELEPHONE ENCOUNTER
----- Message from Adeline Mcclain sent at 2/26/2018 12:02 PM CST -----  Contact: pt  X_  1st Request  _  2nd Request  _  3rd Request    Who:ANGELO MCKEON [[id number]]    Why:Patient states she would like a call back to schedule her surgery... Please contact to further discuss and advise      What Number to Call Back: [phone]    When to Expect a call back: (Before the end of the day)   -- if call after 3:00 call back will be tomorrow.

## 2018-02-26 NOTE — TELEPHONE ENCOUNTER
Patient agrees to 04/09/2018 surgical date.  Sierra Dozier, STELLAP-BC

## 2018-03-02 ENCOUNTER — TELEPHONE (OUTPATIENT)
Dept: ENDOSCOPY | Facility: HOSPITAL | Age: 73
End: 2018-03-02

## 2018-03-06 ENCOUNTER — PATIENT MESSAGE (OUTPATIENT)
Dept: UROGYNECOLOGY | Facility: CLINIC | Age: 73
End: 2018-03-06

## 2018-03-16 ENCOUNTER — TELEPHONE (OUTPATIENT)
Dept: OBSTETRICS AND GYNECOLOGY | Facility: CLINIC | Age: 73
End: 2018-03-16

## 2018-03-18 DIAGNOSIS — N39.46 MIXED STRESS AND URGE URINARY INCONTINENCE: ICD-10-CM

## 2018-03-18 DIAGNOSIS — N81.2 INCOMPLETE UTEROVAGINAL PROLAPSE: Primary | ICD-10-CM

## 2018-03-18 DIAGNOSIS — Z01.818 PREOP TESTING: ICD-10-CM

## 2018-03-19 ENCOUNTER — TELEPHONE (OUTPATIENT)
Dept: OBSTETRICS AND GYNECOLOGY | Facility: CLINIC | Age: 73
End: 2018-03-19

## 2018-03-19 ENCOUNTER — OFFICE VISIT (OUTPATIENT)
Dept: OBSTETRICS AND GYNECOLOGY | Facility: CLINIC | Age: 73
End: 2018-03-19
Attending: OBSTETRICS & GYNECOLOGY
Payer: COMMERCIAL

## 2018-03-19 VITALS
HEIGHT: 64 IN | DIASTOLIC BLOOD PRESSURE: 60 MMHG | SYSTOLIC BLOOD PRESSURE: 98 MMHG | BODY MASS INDEX: 25.97 KG/M2 | WEIGHT: 152.13 LBS

## 2018-03-19 DIAGNOSIS — Z12.39 BREAST CANCER SCREENING: ICD-10-CM

## 2018-03-19 DIAGNOSIS — N81.2 UTEROVAGINAL PROLAPSE, INCOMPLETE: ICD-10-CM

## 2018-03-19 DIAGNOSIS — Z01.419 WELL FEMALE EXAM WITH ROUTINE GYNECOLOGICAL EXAM: Primary | ICD-10-CM

## 2018-03-19 PROCEDURE — 99999 PR PBB SHADOW E&M-EST. PATIENT-LVL III: CPT | Mod: PBBFAC,,, | Performed by: OBSTETRICS & GYNECOLOGY

## 2018-03-19 PROCEDURE — 99397 PER PM REEVAL EST PAT 65+ YR: CPT | Mod: S$GLB,,, | Performed by: OBSTETRICS & GYNECOLOGY

## 2018-03-19 NOTE — TELEPHONE ENCOUNTER
----- Message from Judith Power MA sent at 3/19/2018  4:15 PM CDT -----  Regarding: Disability  The disability form on patient Sury Gao was sent to your email on 03/19. Please review and sign.    Thank you    Judith

## 2018-03-19 NOTE — TELEPHONE ENCOUNTER
----- Message from Magnolia Zavala MD sent at 3/15/2018 10:21 AM CDT -----  See below.    ----- Message -----  From: Seirra Dozier NP  Sent: 2/27/2018  10:16 PM  To: Magnolia Zavala MD, #    Scheduled 4/9/18    K  ----- Message -----  From: Maxx Castellanos DO  Sent: 2/27/2018   6:47 PM  To: Sierra Dozier NP, #    Hi Magnolia,   TLH is fine.Sierra is trying to see which dates make more sense.  Thanks,   Charlee      ----- Message -----  From: Magnolia Zavala MD  Sent: 2/26/2018   1:17 PM  To: Sierra Dozier NP, #    I haven't seen her in a year.  What type of hysterectomy were you thinking?  I prefer to do TLHs over TVH's.  I am happy to have you do the TVH if you think it is best for patient.  Otherwise, I can do the TLH.    I normally operate the second Monday of the month.  So- March 12, April 9.  I sometimes operate on other dates.    ----- Message -----  From: Sierra Dozier NP  Sent: 2/20/2018   4:26 PM  To: MD Dr. Sally See,  When are you available to do Sury Murray's hysterectomy with Dr. Castellanos.    Best,   Sierra Dozier [facility]

## 2018-03-20 ENCOUNTER — OFFICE VISIT (OUTPATIENT)
Dept: INTERNAL MEDICINE | Facility: CLINIC | Age: 73
End: 2018-03-20
Payer: COMMERCIAL

## 2018-03-20 VITALS
DIASTOLIC BLOOD PRESSURE: 64 MMHG | BODY MASS INDEX: 26.39 KG/M2 | HEIGHT: 64 IN | TEMPERATURE: 98 F | RESPIRATION RATE: 16 BRPM | WEIGHT: 154.56 LBS | HEART RATE: 72 BPM | SYSTOLIC BLOOD PRESSURE: 104 MMHG

## 2018-03-20 DIAGNOSIS — E03.9 HYPOTHYROIDISM, UNSPECIFIED TYPE: ICD-10-CM

## 2018-03-20 DIAGNOSIS — M22.2X1 RIGHT PATELLOFEMORAL SYNDROME: ICD-10-CM

## 2018-03-20 DIAGNOSIS — E55.9 VITAMIN D DEFICIENCY: ICD-10-CM

## 2018-03-20 DIAGNOSIS — N81.2 UTEROVAGINAL PROLAPSE, INCOMPLETE: ICD-10-CM

## 2018-03-20 DIAGNOSIS — N81.10 PROLAPSE OF ANTERIOR VAGINAL WALL: ICD-10-CM

## 2018-03-20 DIAGNOSIS — Z01.818 PRE-OP EXAM: Primary | ICD-10-CM

## 2018-03-20 DIAGNOSIS — N81.6 POSTERIOR VAGINAL WALL PROLAPSE: ICD-10-CM

## 2018-03-20 PROCEDURE — 99214 OFFICE O/P EST MOD 30 MIN: CPT | Mod: S$GLB,,, | Performed by: FAMILY MEDICINE

## 2018-03-20 PROCEDURE — 93010 ELECTROCARDIOGRAM REPORT: CPT | Mod: S$GLB,,, | Performed by: INTERNAL MEDICINE

## 2018-03-20 PROCEDURE — 93005 ELECTROCARDIOGRAM TRACING: CPT | Mod: S$GLB,,, | Performed by: FAMILY MEDICINE

## 2018-03-20 PROCEDURE — 99999 PR PBB SHADOW E&M-EST. PATIENT-LVL III: CPT | Mod: PBBFAC,,, | Performed by: FAMILY MEDICINE

## 2018-03-20 NOTE — PROGRESS NOTES
Subjective:       Patient ID: Sury Gao is a 72 y.o. female.    Chief Complaint: Pre-op Exam    HPI 72-year-old white female presents to clinic today for preoperative exam in order to have a hysterectomy performed by Dr. Zavala on April 9, 2018 secondary to incomplete uterovaginal prolapse with associated urinary incontinence.  She continues to have stable hypothyroidism on Synthroid 75 µg daily.  She has been treated for vitamin D deficiency in the past and at this time takes vitamin D 1000 units daily.  It is recommended that repeat vitamin D level be performed for further evaluation.  At this time she also complains of occasional right knee soreness and popping with walking.  She reports improvement of the symptoms with increased physical activity but states that she has decreased her physical activity secondary to worsening symptoms of vaginal prolapse.  Review of Systems   Constitutional: Negative for appetite change, chills, fatigue and fever.   HENT: Negative for congestion, ear pain, hearing loss, postnasal drip, rhinorrhea, sinus pressure, sore throat and tinnitus.    Eyes: Negative for redness, itching and visual disturbance.   Respiratory: Negative for cough, chest tightness and shortness of breath.    Cardiovascular: Negative for chest pain and palpitations.   Gastrointestinal: Negative for abdominal pain, constipation, diarrhea, nausea and vomiting.   Genitourinary: Negative for decreased urine volume, difficulty urinating, dysuria, frequency, hematuria and urgency.   Musculoskeletal: Positive for arthralgias (Hip and knee). Negative for back pain, myalgias, neck pain and neck stiffness.   Skin: Negative for rash.   Neurological: Negative for dizziness, light-headedness and headaches.   Psychiatric/Behavioral: Negative.        Objective:      Physical Exam   Constitutional: She is oriented to person, place, and time. She appears well-developed and well-nourished. No distress.   HENT:    Head: Normocephalic and atraumatic.   Right Ear: External ear normal.   Left Ear: External ear normal.   Nose: Nose normal.   Mouth/Throat: Oropharynx is clear and moist. No oropharyngeal exudate.   Eyes: Conjunctivae and EOM are normal. Pupils are equal, round, and reactive to light. Right eye exhibits no discharge. Left eye exhibits no discharge. No scleral icterus.   Neck: Normal range of motion. Neck supple. No JVD present. No tracheal deviation present. No thyromegaly present.   Cardiovascular: Normal rate, regular rhythm, normal heart sounds and intact distal pulses.  Exam reveals no gallop and no friction rub.    No murmur heard.  Pulmonary/Chest: Effort normal and breath sounds normal. No stridor. No respiratory distress. She has no wheezes. She has no rales.   Abdominal: Soft. Bowel sounds are normal. She exhibits no distension and no mass. There is no tenderness. There is no rebound and no guarding.   Musculoskeletal: Normal range of motion. She exhibits no edema or tenderness.        Right knee: She exhibits abnormal patellar mobility.   Lymphadenopathy:     She has no cervical adenopathy.   Neurological: She is alert and oriented to person, place, and time.   Skin: Skin is warm and dry. No rash noted. She is not diaphoretic. No erythema. No pallor.   Psychiatric: She has a normal mood and affect. Her behavior is normal. Judgment and thought content normal.   Nursing note and vitals reviewed.    EKG: Normal sinus rhythm ventricular rate 66 bpm.   Component      Latest Ref Rng & Units 3/23/2018   WBC      3.90 - 12.70 K/uL 6.06   RBC      4.00 - 5.40 M/uL 4.69   Hemoglobin      12.0 - 16.0 g/dL 14.7   Hematocrit      37.0 - 48.5 % 45.2   MCV      82 - 98 fL 96   MCH      27.0 - 31.0 pg 31.3 (H)   MCHC      32.0 - 36.0 g/dL 32.5   RDW      11.5 - 14.5 % 12.7   Platelets      150 - 350 K/uL 228   MPV      9.2 - 12.9 fL 12.1   Immature Granulocytes      0.0 - 0.5 % 0.2   Gran # (ANC)      1.8 - 7.7 K/uL 3.0    Immature Grans (Abs)      0.00 - 0.04 K/uL 0.01   Lymph #      1.0 - 4.8 K/uL 2.4   Mono #      0.3 - 1.0 K/uL 0.5   Eos #      0.0 - 0.5 K/uL 0.1   Baso #      0.00 - 0.20 K/uL 0.08   nRBC      0 /100 WBC 0   Gran%      38.0 - 73.0 % 49.0   Lymph%      18.0 - 48.0 % 40.1   Mono%      4.0 - 15.0 % 7.4   Eosinophil%      0.0 - 8.0 % 2.0   Basophil%      0.0 - 1.9 % 1.3   Differential Method       Automated   Sodium      136 - 145 mmol/L 141   Potassium      3.5 - 5.1 mmol/L 4.3   Chloride      95 - 110 mmol/L 107   CO2      23 - 29 mmol/L 27   Glucose      70 - 110 mg/dL 91   BUN, Bld      8 - 23 mg/dL 18   Creatinine      0.5 - 1.4 mg/dL 0.8   Calcium      8.7 - 10.5 mg/dL 9.1   Total Protein      6.0 - 8.4 g/dL 6.1   Albumin      3.5 - 5.2 g/dL 3.4 (L)   Total Bilirubin      0.1 - 1.0 mg/dL 0.7   Alkaline Phosphatase      55 - 135 U/L 40 (L)   AST      10 - 40 U/L 14   ALT      10 - 44 U/L 11   Anion Gap      8 - 16 mmol/L 7 (L)   eGFR if African American      >60 mL/min/1.73 m:2 >60.0   eGFR if non African American      >60 mL/min/1.73 m:2 >60.0     Assessment:       1. Pre-op exam    2. Uterovaginal prolapse, incomplete    3. Prolapse of anterior vaginal wall    4. Posterior vaginal wall prolapse    5. Hypothyroidism, unspecified type    6. Vitamin D deficiency        Plan:       1.  CBC and CMP now.  2.  EKG now.  3.  Continue  Synthroid 75 µg daily.  4.  Repeat vitamin D level.  5.  The patient is okay to proceed with surgery as scheduled.  6.  Return to clinic as needed or as previously scheduled.

## 2018-03-23 ENCOUNTER — LAB VISIT (OUTPATIENT)
Dept: LAB | Facility: HOSPITAL | Age: 73
End: 2018-03-23
Attending: FAMILY MEDICINE
Payer: COMMERCIAL

## 2018-03-23 DIAGNOSIS — E55.9 VITAMIN D DEFICIENCY: ICD-10-CM

## 2018-03-23 DIAGNOSIS — E78.5 HYPERLIPIDEMIA, UNSPECIFIED HYPERLIPIDEMIA TYPE: ICD-10-CM

## 2018-03-23 DIAGNOSIS — Z01.818 PRE-OP EXAM: ICD-10-CM

## 2018-03-23 LAB
25(OH)D3+25(OH)D2 SERPL-MCNC: 21 NG/ML
ALBUMIN SERPL BCP-MCNC: 3.4 G/DL
ALP SERPL-CCNC: 40 U/L
ALT SERPL W/O P-5'-P-CCNC: 11 U/L
ANION GAP SERPL CALC-SCNC: 7 MMOL/L
AST SERPL-CCNC: 14 U/L
BASOPHILS # BLD AUTO: 0.08 K/UL
BASOPHILS NFR BLD: 1.3 %
BILIRUB SERPL-MCNC: 0.7 MG/DL
BUN SERPL-MCNC: 18 MG/DL
CALCIUM SERPL-MCNC: 9.1 MG/DL
CHLORIDE SERPL-SCNC: 107 MMOL/L
CHOLEST SERPL-MCNC: 233 MG/DL
CHOLEST/HDLC SERPL: 3.3 {RATIO}
CO2 SERPL-SCNC: 27 MMOL/L
CREAT SERPL-MCNC: 0.8 MG/DL
DIFFERENTIAL METHOD: ABNORMAL
EOSINOPHIL # BLD AUTO: 0.1 K/UL
EOSINOPHIL NFR BLD: 2 %
ERYTHROCYTE [DISTWIDTH] IN BLOOD BY AUTOMATED COUNT: 12.7 %
EST. GFR  (AFRICAN AMERICAN): >60 ML/MIN/1.73 M^2
EST. GFR  (NON AFRICAN AMERICAN): >60 ML/MIN/1.73 M^2
GLUCOSE SERPL-MCNC: 91 MG/DL
HCT VFR BLD AUTO: 45.2 %
HDLC SERPL-MCNC: 71 MG/DL
HDLC SERPL: 30.5 %
HGB BLD-MCNC: 14.7 G/DL
IMM GRANULOCYTES # BLD AUTO: 0.01 K/UL
IMM GRANULOCYTES NFR BLD AUTO: 0.2 %
LDLC SERPL CALC-MCNC: 147.8 MG/DL
LYMPHOCYTES # BLD AUTO: 2.4 K/UL
LYMPHOCYTES NFR BLD: 40.1 %
MCH RBC QN AUTO: 31.3 PG
MCHC RBC AUTO-ENTMCNC: 32.5 G/DL
MCV RBC AUTO: 96 FL
MONOCYTES # BLD AUTO: 0.5 K/UL
MONOCYTES NFR BLD: 7.4 %
NEUTROPHILS # BLD AUTO: 3 K/UL
NEUTROPHILS NFR BLD: 49 %
NONHDLC SERPL-MCNC: 162 MG/DL
NRBC BLD-RTO: 0 /100 WBC
PLATELET # BLD AUTO: 228 K/UL
PMV BLD AUTO: 12.1 FL
POTASSIUM SERPL-SCNC: 4.3 MMOL/L
PROT SERPL-MCNC: 6.1 G/DL
RBC # BLD AUTO: 4.69 M/UL
SODIUM SERPL-SCNC: 141 MMOL/L
TRIGL SERPL-MCNC: 71 MG/DL
WBC # BLD AUTO: 6.06 K/UL

## 2018-03-23 PROCEDURE — 80061 LIPID PANEL: CPT

## 2018-03-23 PROCEDURE — 36415 COLL VENOUS BLD VENIPUNCTURE: CPT | Mod: PO

## 2018-03-23 PROCEDURE — 82306 VITAMIN D 25 HYDROXY: CPT

## 2018-03-23 PROCEDURE — 85025 COMPLETE CBC W/AUTO DIFF WBC: CPT

## 2018-03-23 PROCEDURE — 80053 COMPREHEN METABOLIC PANEL: CPT

## 2018-03-28 ENCOUNTER — TELEPHONE (OUTPATIENT)
Dept: OBSTETRICS AND GYNECOLOGY | Facility: CLINIC | Age: 73
End: 2018-03-28

## 2018-03-28 ENCOUNTER — TELEPHONE (OUTPATIENT)
Dept: UROGYNECOLOGY | Facility: CLINIC | Age: 73
End: 2018-03-28

## 2018-03-28 NOTE — TELEPHONE ENCOUNTER
----- Message from Itzel Stevenson sent at 3/28/2018 12:35 PM CDT -----  Contact: pt  x_ 1st Request  _ 2nd Request  _ 3rd Request    Who: pt    Why: is returning a call    What Number to Call Back: [phone]    When to Expect a call back: (Before the end of the day)  -- if call after 3:00 call back will be tomorrow.

## 2018-03-28 NOTE — TELEPHONE ENCOUNTER
Called patient to discuss her request to change Post op appointment to 05/21/2018. I left a message to call the office.

## 2018-03-28 NOTE — TELEPHONE ENCOUNTER
----- Message from Dulce Goodwin sent at 3/28/2018 11:12 AM CDT -----  Contact: Patient   X _1st Request  _  2nd Request  _  3rd Request    Who:ANGELO MCKEON [[id number]]    Why:Patient is requesting to have her post op visit moved from 05/16/2018 to 05/21/2018 she is requesting a call back to discuss this matter     What Number to Call Back:[phone]    When to Expect a call back: (Before the end of the day)   -- if call after 3:00 call back will be tomorrow.

## 2018-03-29 ENCOUNTER — ANESTHESIA EVENT (OUTPATIENT)
Dept: SURGERY | Facility: OTHER | Age: 73
End: 2018-03-29
Payer: COMMERCIAL

## 2018-03-29 ENCOUNTER — OFFICE VISIT (OUTPATIENT)
Dept: OBSTETRICS AND GYNECOLOGY | Facility: CLINIC | Age: 73
End: 2018-03-29
Attending: OBSTETRICS & GYNECOLOGY
Payer: COMMERCIAL

## 2018-03-29 ENCOUNTER — OFFICE VISIT (OUTPATIENT)
Dept: UROGYNECOLOGY | Facility: CLINIC | Age: 73
End: 2018-03-29
Payer: COMMERCIAL

## 2018-03-29 ENCOUNTER — HOSPITAL ENCOUNTER (OUTPATIENT)
Dept: RADIOLOGY | Facility: OTHER | Age: 73
Discharge: HOME | End: 2018-03-29
Attending: NURSE PRACTITIONER
Payer: COMMERCIAL

## 2018-03-29 ENCOUNTER — HOSPITAL ENCOUNTER (OUTPATIENT)
Dept: PREADMISSION TESTING | Facility: OTHER | Age: 73
Discharge: HOME | End: 2018-03-29
Attending: OBSTETRICS & GYNECOLOGY
Payer: COMMERCIAL

## 2018-03-29 VITALS
HEIGHT: 64 IN | DIASTOLIC BLOOD PRESSURE: 60 MMHG | WEIGHT: 155.88 LBS | BODY MASS INDEX: 26.61 KG/M2 | SYSTOLIC BLOOD PRESSURE: 100 MMHG

## 2018-03-29 VITALS
BODY MASS INDEX: 25.61 KG/M2 | WEIGHT: 150 LBS | HEART RATE: 68 BPM | RESPIRATION RATE: 16 BRPM | DIASTOLIC BLOOD PRESSURE: 54 MMHG | SYSTOLIC BLOOD PRESSURE: 96 MMHG | TEMPERATURE: 98 F | HEIGHT: 64 IN | OXYGEN SATURATION: 96 %

## 2018-03-29 VITALS
DIASTOLIC BLOOD PRESSURE: 68 MMHG | WEIGHT: 150.38 LBS | SYSTOLIC BLOOD PRESSURE: 102 MMHG | BODY MASS INDEX: 25.67 KG/M2 | HEIGHT: 64 IN

## 2018-03-29 DIAGNOSIS — N81.6 POSTERIOR VAGINAL WALL PROLAPSE: ICD-10-CM

## 2018-03-29 DIAGNOSIS — Z01.818 PRE-OP EVALUATION: Primary | ICD-10-CM

## 2018-03-29 DIAGNOSIS — N81.2 UTEROVAGINAL PROLAPSE, INCOMPLETE: Primary | ICD-10-CM

## 2018-03-29 DIAGNOSIS — N81.10 PROLAPSE OF ANTERIOR VAGINAL WALL: ICD-10-CM

## 2018-03-29 DIAGNOSIS — Z01.818 PREOPERATIVE EXAM FOR GYNECOLOGIC SURGERY: Primary | ICD-10-CM

## 2018-03-29 DIAGNOSIS — Z01.818 PREOP TESTING: ICD-10-CM

## 2018-03-29 DIAGNOSIS — N81.2 UTEROVAGINAL PROLAPSE, INCOMPLETE: ICD-10-CM

## 2018-03-29 LAB
ABO + RH BLD: NORMAL
BLD GP AB SCN CELLS X3 SERPL QL: NORMAL

## 2018-03-29 PROCEDURE — 99999 PR PBB SHADOW E&M-EST. PATIENT-LVL III: CPT | Mod: PBBFAC,,, | Performed by: NURSE PRACTITIONER

## 2018-03-29 PROCEDURE — 36415 COLL VENOUS BLD VENIPUNCTURE: CPT

## 2018-03-29 PROCEDURE — 99499 UNLISTED E&M SERVICE: CPT | Mod: S$GLB,,, | Performed by: NURSE PRACTITIONER

## 2018-03-29 PROCEDURE — 86850 RBC ANTIBODY SCREEN: CPT

## 2018-03-29 PROCEDURE — 71046 X-RAY EXAM CHEST 2 VIEWS: CPT | Mod: TC,FY

## 2018-03-29 PROCEDURE — 99499 UNLISTED E&M SERVICE: CPT | Mod: S$GLB,,, | Performed by: OBSTETRICS & GYNECOLOGY

## 2018-03-29 PROCEDURE — 99999 PR PBB SHADOW E&M-EST. PATIENT-LVL III: CPT | Mod: PBBFAC,,, | Performed by: OBSTETRICS & GYNECOLOGY

## 2018-03-29 PROCEDURE — 71046 X-RAY EXAM CHEST 2 VIEWS: CPT | Mod: 26,,, | Performed by: RADIOLOGY

## 2018-03-29 PROCEDURE — 87480 CANDIDA DNA DIR PROBE: CPT

## 2018-03-29 RX ORDER — DOCUSATE SODIUM 100 MG/1
100 CAPSULE, LIQUID FILLED ORAL 2 TIMES DAILY
Qty: 100 CAPSULE | Refills: 2 | Status: SHIPPED | OUTPATIENT
Start: 2018-03-29 | End: 2018-03-29

## 2018-03-29 RX ORDER — PREGABALIN 75 MG/1
150 CAPSULE ORAL
Status: DISCONTINUED | OUTPATIENT
Start: 2018-03-29 | End: 2018-03-30 | Stop reason: HOSPADM

## 2018-03-29 RX ORDER — SODIUM CHLORIDE, SODIUM LACTATE, POTASSIUM CHLORIDE, CALCIUM CHLORIDE 600; 310; 30; 20 MG/100ML; MG/100ML; MG/100ML; MG/100ML
INJECTION, SOLUTION INTRAVENOUS CONTINUOUS
Status: CANCELLED | OUTPATIENT
Start: 2018-03-29

## 2018-03-29 RX ORDER — IBUPROFEN 800 MG/1
800 TABLET ORAL EVERY 6 HOURS PRN
Qty: 30 TABLET | Refills: 1 | Status: SHIPPED | OUTPATIENT
Start: 2018-03-29 | End: 2018-05-31 | Stop reason: ALTCHOICE

## 2018-03-29 RX ORDER — HYDROCODONE BITARTRATE AND IBUPROFEN 5; 200 MG/1; MG/1
1 TABLET ORAL EVERY 8 HOURS PRN
COMMUNITY
End: 2018-05-31 | Stop reason: ALTCHOICE

## 2018-03-29 RX ORDER — MIDAZOLAM HYDROCHLORIDE 1 MG/ML
5 INJECTION INTRAMUSCULAR; INTRAVENOUS
Status: DISCONTINUED | OUTPATIENT
Start: 2018-03-29 | End: 2018-03-30 | Stop reason: HOSPADM

## 2018-03-29 RX ORDER — LIDOCAINE HYDROCHLORIDE 10 MG/ML
0.5 INJECTION, SOLUTION EPIDURAL; INFILTRATION; INTRACAUDAL; PERINEURAL ONCE
Status: CANCELLED | OUTPATIENT
Start: 2018-03-29 | End: 2018-03-29

## 2018-03-29 RX ORDER — HYDROCODONE BITARTRATE AND IBUPROFEN 7.5; 2 MG/1; MG/1
1 TABLET, FILM COATED ORAL EVERY 8 HOURS PRN
Qty: 30 TABLET | Refills: 0 | Status: SHIPPED | OUTPATIENT
Start: 2018-03-29 | End: 2018-03-29

## 2018-03-29 NOTE — PROGRESS NOTES
SUBJECTIVE:   72 y.o. female   for routine gyn exam. No LMP recorded. Patient is postmenopausal..  She reports pelvic prolapse and some urinary incontinence.  Saw Dr. Castellanos  for sx, and desires hysterectomy with cuff suspension.         Past Medical History:   Diagnosis Date    Hypothyroidism     Nuclear sclerosis 2014    Thyroid disease     hypothyroid     Past Surgical History:   Procedure Laterality Date    Bilateral lower extremity varicose vein treatment       COLONOSCOPY N/A 2018    DILATION AND CURETTAGE OF UTERUS      gutierrez neuroma      left foot    MINI-LAPAROTOMY      D&C perforation    WRIST SURGERY      left wrist: Swave-Kapunjay surgery     Social History     Social History    Marital status:      Spouse name: N/A    Number of children: N/A    Years of education: N/A     Occupational History    Not on file.     Social History Main Topics    Smoking status: Former Smoker    Smokeless tobacco: Never Used    Alcohol use Yes      Comment: social    Drug use: No    Sexual activity: Not Currently     Other Topics Concern    Not on file     Social History Narrative    No narrative on file     Family History   Problem Relation Age of Onset    Hypothyroidism Mother     Cataracts Mother     Heart disease Mother      Valvular heart disease    Cataracts Father     Macular degeneration Father     Hypothyroidism Sister     Hypothyroidism Daughter     Cancer Brother      testicular cancer    Amblyopia Neg Hx     Blindness Neg Hx     Glaucoma Neg Hx     Retinal detachment Neg Hx     Strabismus Neg Hx     Breast cancer Neg Hx     Colon cancer Neg Hx     Diabetes Neg Hx     Hypertension Neg Hx     Ovarian cancer Neg Hx     Cervical cancer Neg Hx     Endometrial cancer Neg Hx     Vaginal cancer Neg Hx      OB History    Para Term  AB Living   4 3     1 3   SAB TAB Ectopic Multiple Live Births   1       3      # Outcome Date GA  "Lbr Bertin/2nd Weight Sex Delivery Anes PTL Lv   4 SAB            3 Para            2 Para            1 Para                     Current Outpatient Prescriptions   Medication Sig Dispense Refill    fexofenadine (ALLEGRA) 30 MG tablet Take 30 mg by mouth continuous prn.      loratadine (CLARITIN) 10 mg tablet Take 10 mg by mouth daily as needed.      SYNTHROID 75 mcg tablet TAKE 1 TABLET BY MOUTH DAILY 90 tablet 3     No current facility-administered medications for this visit.      Allergies: Patient has no known allergies.     ROS:  Constitutional: no weight loss, weight gain, fever, fatigue  Eyes:  No vision changes, glasses/contacts  ENT/Mouth: No ulcers, sinus problems, ears ringing, headache  Cardiovascular: No inability to lie flat, chest pain, exercise intolerance, swelling, heart palpitations  Respiratory: No wheezing, coughing blood, shortness of breath, or cough  Gastrointestinal: No diarrhea, bloody stool, nausea/vomiting, constipation, gas, hemorrhoids  Genitourinary: No blood in urine, painful urination, urgency of urination, frequency of urination, incomplete emptying,+ incontinence, abnormal bleeding, painful periods, heavy periods, vaginal discharge, vaginal odor, painful intercourse, sexual problems, bleeding after intercourse.  Musculoskeletal: No muscle weakness  Skin/Breast: No painful breasts, nipple discharge, masses, rash, ulcers  Neurological: No passing out, seizures, numbness, headache  Endocrine: No diabetes, hypothyroid, hyperthyroid, hot flashes, hair loss, abnormal hair growth, ance  Psychiatric: No depression, crying  Hematologic: No bruises, bleeding, swollen lymph nodes, anemia.      OBJECTIVE:   The patient appears well, alert, oriented x 3, in no distress.  BP 98/60 (BP Location: Right arm, Patient Position: Sitting, BP Method: Medium (Manual))   Ht 5' 4" (1.626 m)   Wt 69 kg (152 lb 1.9 oz)   BMI 26.11 kg/m²   NECK: no thyromegaly, trachea midline  SKIN: no acne, striae, " hirsutism  CHEST: CTAB  CV: RRR  BREAST EXAM: breasts appear normal, no suspicious masses, no skin or nipple changes or axillary nodes  ABDOMEN: no hernias, masses, or hepatosplenomegaly  GENITALIA: normal external genitalia, no erythema, no discharge  URETHRA: normal urethra, normal urethral meatus  VAGINA: Normal  CERVIX: no lesions or cervical motion tenderness  UTERUS: normal size, non-tender  ADNEXA: no mass, fullness, tenderness      ASSESSMENT:   1. Well female exam with routine gynecological exam     2. Breast cancer screening  Mammo Digital Screening Bilat with CAD   3. Uterovaginal prolapse, incomplete         PLAN:   Orders Placed This Encounter    Mammo Digital Screening Bilat with CAD     Discussed hysterectomy, pre-op appt.  Return to clinic in 1 year for WWE

## 2018-03-29 NOTE — DISCHARGE INSTRUCTIONS
PRE-ADMIT TESTING -  [phone]    [address]  [place] [facility]          Your surgery has been scheduled at [facility]. We are pleased to have the opportunity to serve you. For Further Information please call [phone].    On the day of surgery please report to the Information Desk on the 1st floor.    · CONTACT YOUR PHYSICIAN'S OFFICE THE DAY PRIOR TO YOUR SURGERY TO OBTAIN YOUR ARRIVAL TIME.     · The evening before surgery do not eat anything after 9 p.m. ( this includes hard candy, chewing gum and mints).  You may only have GATORADE, POWERADE AND WATER  from 9 p.m. until you leave your home.   DO NOT DRINK ANY LIQUIDS ON THE WAY TO THE HOSPITAL.      SPECIAL MEDICATION INSTRUCTIONS: TAKE medications checked off by the Anesthesiologist on your Medication List.    Angiogram Patients: Take medications as instructed by your physician, including aspirin.     Surgery Patients:    If you take ASPIRIN - Your PHYSICIAN/SURGEON will need to inform you IF/OR when you need to stop taking aspirin prior to your surgery.     Do Not take any medications containing IBUPROFEN.  Do Not Wear any make-up or dark nail polish   (especially eye make-up) to surgery. If you come to surgery with makeup on you will be required to remove the makeup or nail polish.    Do not shave your surgical area at least 5 days prior to your surgery. The surgical prep will be performed at the hospital according to Infection Control regulations.    Leave all valuables at home.   Do Not wear any jewelry or watches, including any metal in body piercings.  Contact Lens must be removed before surgery. Either do not wear the contact lens or bring a case and solution for storage.  Please bring a container for eyeglasses or dentures as required.  Bring any paperwork your physician has provided, such as consent forms,  history and physicals, doctor's orders, etc.   Bring comfortable clothes that are loose fitting to wear upon  discharge. Take into consideration the type of surgery being performed.  Maintain your diet as advised per your physician the day prior to surgery.      Adequate rest the night before surgery is advised.   Park in the Parking lot behind the hospital or in the [facility] across the street from the parking lot. Parking is complimentary.  If you will be discharged the same day as your procedure, please arrange for a responsible adult to drive you home or to accompany you if traveling by taxi.   YOU WILL NOT BE PERMITTED TO DRIVE OR TO LEAVE THE HOSPITAL ALONE AFTER SURGERY.   It is strongly recommended that you arrange for someone to remain with you for the first 24 hrs following your surgery.       Thank you for your cooperation.  The Staff of [facility].        Bathing Instructions                                                                 Please shower the evening before and morning of your procedure with    ANTIBACTERIAL SOAP. ( DIAL, etc )  Concentrate on the surgical area   for at least 3 minutes and rinse completely. Dry off as usual.   Do not use any deodorant, powder, body lotions, perfume, after shave or    cologne.

## 2018-03-29 NOTE — ANESTHESIA PREPROCEDURE EVALUATION
03/29/2018  Sury Gao is a 72 y.o., female.    Anesthesia Evaluation    I have reviewed the Patient Summary Reports.    I have reviewed the Nursing Notes.   I have reviewed the Medications.     Review of Systems  Anesthesia Hx:  Denies Family Hx of Anesthesia complications.   Denies Personal Hx of Anesthesia complications.   Social:  Non-Smoker    Hematology/Oncology:  Hematology Normal   Oncology Normal     EENT/Dental:   chronic allergic rhinitis   Cardiovascular:  Cardiovascular Normal     Pulmonary:  Pulmonary Normal    Renal/:  Renal/ Normal     Hepatic/GI:  Hepatic/GI Normal    Musculoskeletal:  Musculoskeletal Normal    Neurological:  Neurology Normal    Endocrine:   Hypothyroidism    Dermatological:  Skin Normal    Psych:  Psychiatric Normal           Physical Exam  General:  Well nourished    Airway/Jaw/Neck:  Airway Findings: Mouth Opening: Normal General Airway Assessment: Adult  Mallampati: II  TM Distance: Normal, at least 6 cm      Dental:  Dental Findings: In tact, molar caps        Mental Status:  Mental Status Findings:  Alert and Oriented, Cooperative         Anesthesia Plan  Type of Anesthesia, risks & benefits discussed:  Anesthesia Type:  general  Patient's Preference:   Intra-op Monitoring Plan: standard ASA monitors  Intra-op Monitoring Plan Comments:   Post Op Pain Control Plan: multimodal analgesia  Post Op Pain Control Plan Comments:   Induction:   IV  Beta Blocker:         Informed Consent: Patient understands risks and agrees with Anesthesia plan.  Questions answered. Anesthesia consent signed with patient.  ASA Score: 2     Day of Surgery Review of History & Physical:    H&P update referred to the surgeon.         Ready For Surgery From Anesthesia Perspective.

## 2018-03-29 NOTE — PROGRESS NOTES
Urogyn follow up  03/29/2018  .  [facility] [address] [place] [address]    Sury Gao  [id number]  1945      Sury Gao is a 72 y.o.  here for a urogyn follow up.    Last HPI from 01/29/2018     History of Present Illness 72 Y O F P3 with bothersome one vaginal bulge and pelvic pressure noted while she was exercising. Patient has also noted urinary incontinence associated with urgency. She was referred for evaluation by Dr. Zavala initially for evaluation and presents for follow up. At the initial evalaution we discussed non-surgical options, including the pessary which she declined. She presents today to further discuss surgical options. She is concerned that the prolapse may have worsened.      HPI [facility] Urogyn Hpi      Question 12/20/2017  2:12 PM CST   General Urogynecology: Are you experiencing the following?     Dysuria (painful urination) No   Nocturia:  waking up at night to empty your bladder  Yes more bothersome   If you answered yes to the previous question, how many times does this happen per night? 1-2   Enuresis (urine loss during sleep) No   Dribbling urine after you urinate Yes   Hematuria (urine appears red) No   Type of stream Interrupted, recent    Urinary frequency: How often a day are you going to the bathroom per day?  10-15   Urinary Tract Infections: How many Urinary Tract Infections have you had in the past year? One (1) in the past year   If you have had a UTI in the past year, what treatments have you had so far?  I have not had a UTI in the past year   Urinary Incontinence (General): Are you experiencing the following?     Past consultation for incontinence: Have you ever seen someone for the evaluation of incontinence? No   If you answered yes to the previous question, please select all the therapies you have tried.  N/a- I answered no to the previous question   Please note the effectiveness of the therapies.    "  Need to wear protection to keep clothes dry  No   If you answered yes to the previous question, please yodit the protection you use.  N/a- I answered no to the previous question   If you wear protection, how much wetness is typically on each pad? N/a- I do not wear protection   If you wear protection, how often do you have to change per day, if applicable?      Stress Symptoms: Are you experiencing the following?     Leakage of urine with cough, laugh and/or sneeze Yes   If you answered yes to the previous question, what is the frequency in days, weeks and/or months? Several times a month   Leakage of urine with sex No   Leakage of urine with bending/ lifting No   Leakage of urine with briskly walking or jogging Yes   If you lose urine for any other reason not previously mentioned, please note it below, if applicable.      Urge Symptoms: Are you experiencing the following?     Urgency ("got to go" feeling) yes   Urge: How frequently do you feel an urge to urinate (feeling like you "gotta go" to the bathroom and can't wait) Weekly   Do you experience a leakage of urine when you have a feeling of urgency?  Yes   Leakage of urine when unaware No   Past use of anticholinergics (medications used to treat overactive bladder) No   If you answered yes to the previous question, please yodit the anticholinergics you have used:      Have you ever used Mirbetriq (aka Mirabegron)?  No   Prolapse Symptoms: Are you experiencing any of the following?      Falling out/ Bulging/ Heaviness in the vagina Yes   Vaginal/ Abdominal Pain/ Pressure Yes   Need to strain/ Push to void No   Need to wait on the toilet before you void No   Unusual position to urinate (using your hands to push back the vaginal bulge) No   Sensation of incomplete emptying Yes, new since the prolapse    Past use of pessary device No   If you answered yes to the previous question, please list the devices you have used below.      Bowel Symptoms: Are you experiencing " any of the following?     Constipation No   Diarrhea  No   Hematochezia (bloody stool) No   Incomplete evacuation of stool No   Involuntary loss of formed stool No   Fecal smearing/urgency No   Involuntary loss of gas No   Vaginal Symptoms: Are you experiencing any of the following?      Abnormal vaginal bleeding  No   Vaginal dryness No   Sexually active  No   Dyspareunia (painful intercourse) No   Estrogen use  No      Aa: 2 Ba: 2 C: 0   GH: 4 PB: 2 TVL: 9   Ap: -1 Bp: -1 D: -4           Changes from last visit:  1)  UI:  (+) VLADIMIR  (if bladder too full) (+) UUI --rarely.  (--) pads:  Daytime frequency: Q 2-3 hours.  Nocturia: Yes: 1/night.   (--) dysuria,  (--) hematuria,  (--) frequent UTIs.  (+) complete bladder emptying.    2)  POP:  Present. below introitus.  Symptoms:(--)  .  (--) vaginal bleeding. (--) vaginal discharge. (--) sexually active.    (+) Vaginal dryness.  (--) vaginal estrogen use.     3)  BM:  (--) constipation/straining.  (--) chronic diarrhea. (--) hematochezia.  (--) fecal incontinence.  (--) fecal smearing/urgency.  (--) incomplete evacuation.        Suds  1.  VOIDING PHASE:       a.  Uroflowmetry:  · Prolapse reduction: No  · Voided volume:  297 mL   · Voiding time:   17 seconds  · Max flow:  31 mL/s  · Avg flow:   16 mL/s   · PVR:   25 mL     The overall configuration of this uroflow study was  Normal .       b.  Pressure flow:  · Prolapse reduction: No  · Voided volume:   566 mL  · Voiding time:   62 seconds  · Peak flow:  30 mL/s   · Avg flow:  11.7 mL/s  · Max det pressure:  40  cm H20  · Det pressure at max flow: n/a cm H20  · Void initiated by  permission.    · Urethral relaxation (EMG):  normal .    · PVR (calculated):  0  mL     The overall configuration of this pressure flow study was normal flow.       2.  FILLING PHASE:  · 1st desire: 82 mL  · Normal desire:  131 mL  · Strong desire:  255 mL  · Urgency:  462 mL  · Compliance (calculated)  25.1 mL/cm H20  · EMG activity during  filling:  Normal   · Detrusor contractions observed: No     3.  URETHRAL FUNCTION/STORAGE PHASE:     a.  WITHOUT prolapse reduction:  · CLPP (150 mL): Negative  at  72 cm H20  · VLPP (150 mL): Negative  at  73 cm H20   · CLPP (MAX ):    Negative  at  61 cm H20  · VLPP (MAX):     Negative  at  63 cm H20     These findings are consistent with Negative urodynamic stress incontinence.     Assessment:  UF:  No incontinence PF  Prolapse.  Compliance  Normal .  Max capacity  Normal .  DO (--).  VLADIMIR (--).       Plan:  No need for a Mid-urethral sling at the time of surgery.     Cysto  Findings: Urethroscopy:  Normal.  Cystoscopy:  Normal bladder mucosa, bilateral ureteral flow was noted.      Assessment: Essentially normal cystourethroscopy.         Past Medical History:   Diagnosis Date    Hypothyroidism     Nuclear sclerosis 4/23/2014    Thyroid disease     hypothyroid       Past Surgical History:   Procedure Laterality Date    Bilateral lower extremity varicose vein treatment       COLONOSCOPY N/A 2/23/2018    DILATION AND CURETTAGE OF UTERUS      gutierrez neuroma      left foot    MINI-LAPAROTOMY      D&C perforation    TONSILLECTOMY      WRIST SURGERY      left wrist: Swave-Kapunjay surgery       Current Outpatient Prescriptions   Medication Sig    fexofenadine (ALLEGRA) 30 MG tablet Take 30 mg by mouth continuous prn.    loratadine (CLARITIN) 10 mg tablet Take 10 mg by mouth daily as needed.    SYNTHROID 75 mcg tablet TAKE 1 TABLET BY MOUTH DAILY    HYDROcodone-ibuprofen (VICOPROFEN) 5-200 mg per tablet Take 1 tablet by mouth every 8 (eight) hours as needed for Pain.    ibuprofen (ADVIL,MOTRIN) 800 MG tablet Take 1 tablet (800 mg total) by mouth every 6 (six) hours as needed for Pain.     No current facility-administered medications for this visit.      Facility-Administered Medications Ordered in Other Visits   Medication    midazolam (VERSED) 1 mg/mL injection 5 mg    pregabalin capsule 150 mg  "      ROS:  As per HPI.      Exam  /60 (BP Location: Right arm, Patient Position: Sitting, BP Method: Medium (Manual))   Ht 5' 4" (1.626 m)   Wt 70.7 kg (155 lb 13.8 oz)   BMI 26.75 kg/m²   General: alert and oriented, no acute distress  Respiratory: normal respiratory effort  Abd: soft, non-tender, non-distended    Pelvic--deferred    Impression  1. Pre-op evaluation  ibuprofen (ADVIL,MOTRIN) 800 MG tablet    DISCONTINUED: hydrocodone-ibuprofen 7.5-200 mg (VICOPROFEN) 7.5-200 mg per tablet    DISCONTINUED: docusate sodium (COLACE) 100 MG capsule   2. Uterovaginal prolapse, incomplete     3. Prolapse of anterior vaginal wall     4. Posterior vaginal wall prolapse       We reviewed the above issues and discussed options for short-term versus long-term management of her problems.   Plan:   1. 1. Patient consented with Dr. Castellanos for laparoscopic sacrocolpopexy with synthetic mesh, possible anterior/posterior repair with perineorrhaphy, possible laparotomy, possible placement of synthetic midurethral sling (patient did not leak with urodynamics-- will check for occult leakage in OR), and cystourethroscopy.   R/B/A reviewed. Specific risks reviewed include:  infection, bleeding, need for blood transfusion, damage to surrounding structures, anesthesia risks, death, heart attack, stroke, mesh erosion/extrusion, pain, dyspareunia, urinary retention, voiding dysfunction, urinary incontinence, exacerbation of urinary urge incontinence, and need for further surgeries.  We reviewed potential for failure of POP defect repair and need for future surgery, with no way of predicting risk.  She understands success rate of ASC approaches 85%.  Success rate of midurethral sling for VLADIMIR was reviewed as 80-85%, and she understands that this will not necessarily impact other types of urinary incontinence.  Alternatives reviewed include: pessary/PT for POP and pessary/periurethral injections/PT/medication for VLADIMIR.    2. T&S, " urine HCG on DOS  3. Preoperative appointment with PCP or cardiology: Yes -cleared  4. VTE Prophylaxis:  lovenox 40 mg sq daily + SCDs  5. Patient instructed on Magnesium citrate and chlorahexadine/dial soap prep to perform day before & AM of surgery.   6. Proceed to OR for above-mentioned procedure.        30 minutes were spent in face to face time with this patient  75 % of this time was spent in counseling and/or coordination of care  ELIZABETH Carlisle-BC [facility]  Division of Female Pelvic Medicine and Reconstructive Surgery  Department of Obstetrics & Gynecology

## 2018-03-30 LAB
CANDIDA RRNA VAG QL PROBE: NEGATIVE
G VAGINALIS RRNA GENITAL QL PROBE: NEGATIVE
T VAGINALIS RRNA GENITAL QL PROBE: NEGATIVE

## 2018-03-30 NOTE — PROGRESS NOTES
SUBJECTIVE:   72 y.o. female   for preoperative gyn exam. No LMP recorded. Patient is postmenopausal..  She has symptomatic uterine prolapse that intereferes with her lifestyle.   Causes her pelvic pan and discomfort.  She desires surgical intevention.  Saw Dr. Castellanos  for sx, and desires hysterectomy with cuff suspension.         Past Medical History:   Diagnosis Date    Hypothyroidism     Nuclear sclerosis 2014    Thyroid disease     hypothyroid     Past Surgical History:   Procedure Laterality Date    Bilateral lower extremity varicose vein treatment       COLONOSCOPY N/A 2018    DILATION AND CURETTAGE OF UTERUS      gutierrez neuroma      left foot    MINI-LAPAROTOMY      D&C perforation    TONSILLECTOMY      WRIST SURGERY      left wrist: Swave-Kapunjay surgery     Social History     Social History    Marital status:      Spouse name: N/A    Number of children: N/A    Years of education: N/A     Occupational History    Not on file.     Social History Main Topics    Smoking status: Former Smoker    Smokeless tobacco: Never Used    Alcohol use Yes      Comment: social    Drug use: No    Sexual activity: Not Currently     Other Topics Concern    Not on file     Social History Narrative    No narrative on file     Family History   Problem Relation Age of Onset    Hypothyroidism Mother     Cataracts Mother     Heart disease Mother      Valvular heart disease    Cataracts Father     Macular degeneration Father     Hypothyroidism Sister     Hypothyroidism Daughter     Cancer Brother      testicular cancer    Amblyopia Neg Hx     Blindness Neg Hx     Glaucoma Neg Hx     Retinal detachment Neg Hx     Strabismus Neg Hx     Breast cancer Neg Hx     Colon cancer Neg Hx     Diabetes Neg Hx     Hypertension Neg Hx     Ovarian cancer Neg Hx     Cervical cancer Neg Hx     Endometrial cancer Neg Hx     Vaginal cancer Neg Hx      OB History     Para Term  AB Living   4 3     1 3   SAB TAB Ectopic Multiple Live Births   1       3      # Outcome Date GA Lbr Bertin/2nd Weight Sex Delivery Anes PTL Lv   4 SAB            3 Para            2 Para            1 Para                     Current Outpatient Prescriptions   Medication Sig Dispense Refill    fexofenadine (ALLEGRA) 30 MG tablet Take 30 mg by mouth continuous prn.      loratadine (CLARITIN) 10 mg tablet Take 10 mg by mouth daily as needed.      SYNTHROID 75 mcg tablet TAKE 1 TABLET BY MOUTH DAILY 90 tablet 3    HYDROcodone-ibuprofen (VICOPROFEN) 5-200 mg per tablet Take 1 tablet by mouth every 8 (eight) hours as needed for Pain.      ibuprofen (ADVIL,MOTRIN) 800 MG tablet Take 1 tablet (800 mg total) by mouth every 6 (six) hours as needed for Pain. 30 tablet 1     No current facility-administered medications for this visit.      Facility-Administered Medications Ordered in Other Visits   Medication Dose Route Frequency Provider Last Rate Last Dose    midazolam (VERSED) 1 mg/mL injection 5 mg  5 mg Intramuscular Once Pre-Op Jimbo Tovar MD        pregabalin capsule 150 mg  150 mg Oral Once Pre-Op Jimbo Tovar MD         Allergies: Patient has no known allergies.     ROS:  Constitutional: no weight loss, weight gain, fever, fatigue  Eyes:  No vision changes, glasses/contacts  ENT/Mouth: No ulcers, sinus problems, ears ringing, headache  Cardiovascular: No inability to lie flat, chest pain, exercise intolerance, swelling, heart palpitations  Respiratory: No wheezing, coughing blood, shortness of breath, or cough  Gastrointestinal: No diarrhea, bloody stool, nausea/vomiting, constipation, gas, hemorrhoids  Genitourinary: No blood in urine, painful urination, urgency of urination, frequency of urination, incomplete emptying, incontinence, abnormal bleeding, painful periods, heavy periods, vaginal discharge, vaginal odor, painful intercourse, sexual problems, bleeding after  "intercourse.  Musculoskeletal: No muscle weakness  Skin/Breast: No painful breasts, nipple discharge, masses, rash, ulcers  Neurological: No passing out, seizures, numbness, headache  Endocrine: No diabetes, hypothyroid, hyperthyroid, hot flashes, hair loss, abnormal hair growth, ance  Psychiatric: No depression, crying  Hematologic: No bruises, bleeding, swollen lymph nodes, anemia.      OBJECTIVE:   The patient appears well, alert, oriented x 3, in no distress.  /68   Ht 5' 4" (1.626 m)   Wt 68.2 kg (150 lb 5.7 oz)   BMI 25.81 kg/m²   NECK: no thyromegaly, trachea midline  SKIN: no acne, striae, hirsutism  CHEST: CTAB  CV: RRR  BREAST EXAM: Deferred  ABDOMEN: no hernias, masses, or hepatosplenomegaly  GENITALIA: normal external genitalia, no erythema, no discharge  URETHRA: normal urethra, normal urethral meatus  VAGINA: Normal  CERVIX: no lesions or cervical motion tenderness  UTERUS: normal size, non-tender  ADNEXA: no mass, fullness, tenderness      ASSESSMENT:   1. Preoperative exam for gynecologic surgery  Vaginosis Screen by DNA Probe   2. Uterovaginal prolapse, incomplete     3. Prolapse of anterior vaginal wall     4. Posterior vaginal wall prolapse         PLAN:   Orders Placed This Encounter    Vaginosis Screen by DNA Probe     Consents signed  TLH/ BSO.  Cuff suspension per Dr. Castellanos  Return to clinic for post op  "

## 2018-04-03 ENCOUNTER — HOSPITAL ENCOUNTER (OUTPATIENT)
Dept: RADIOLOGY | Facility: HOSPITAL | Age: 73
Discharge: HOME | End: 2018-04-03
Attending: OBSTETRICS & GYNECOLOGY
Payer: COMMERCIAL

## 2018-04-03 DIAGNOSIS — Z12.39 BREAST CANCER SCREENING: ICD-10-CM

## 2018-04-03 PROCEDURE — 77063 BREAST TOMOSYNTHESIS BI: CPT | Mod: 26,,, | Performed by: RADIOLOGY

## 2018-04-03 PROCEDURE — 77067 SCR MAMMO BI INCL CAD: CPT | Mod: 26,,, | Performed by: RADIOLOGY

## 2018-04-03 PROCEDURE — 77067 SCR MAMMO BI INCL CAD: CPT | Mod: TC,PO

## 2018-04-06 NOTE — OR NURSING
Reviewed 's robotic laparoscopic hysterectomy discharge instructions, prior to surgery, with verbalized understanding per patient.  In addition, reviewed with the patient symptoms to report after your surgery: Fever > 100.4. F or greater, painful urination, increase in vaginal bleeding, persistent nausea/vomiting, incisions become reddened or have foul smelling or bloody or discolored drainage and worsening pain.

## 2018-04-09 ENCOUNTER — ANESTHESIA (OUTPATIENT)
Dept: SURGERY | Facility: OTHER | Age: 73
End: 2018-04-09
Payer: COMMERCIAL

## 2018-04-09 ENCOUNTER — HOSPITAL ENCOUNTER (OUTPATIENT)
Facility: OTHER | Age: 73
Discharge: HOME | End: 2018-04-10
Attending: OBSTETRICS & GYNECOLOGY | Admitting: OBSTETRICS & GYNECOLOGY
Payer: COMMERCIAL

## 2018-04-09 DIAGNOSIS — N81.2 UTEROVAGINAL PROLAPSE, INCOMPLETE: Primary | ICD-10-CM

## 2018-04-09 PROBLEM — Z90.710 S/P LAPAROSCOPIC HYSTERECTOMY: Status: ACTIVE | Noted: 2018-04-09

## 2018-04-09 LAB
POCT GLUCOSE: 142 MG/DL (ref 70–110)
POCT GLUCOSE: 83 MG/DL (ref 70–110)

## 2018-04-09 PROCEDURE — C1763 CONN TISS, NON-HUMAN: HCPCS | Performed by: OBSTETRICS & GYNECOLOGY

## 2018-04-09 PROCEDURE — 63600175 PHARM REV CODE 636 W HCPCS: Performed by: NURSE ANESTHETIST, CERTIFIED REGISTERED

## 2018-04-09 PROCEDURE — 88342 IMHCHEM/IMCYTCHM 1ST ANTB: CPT | Mod: 26,,, | Performed by: PATHOLOGY

## 2018-04-09 PROCEDURE — 63600175 PHARM REV CODE 636 W HCPCS: Performed by: STUDENT IN AN ORGANIZED HEALTH CARE EDUCATION/TRAINING PROGRAM

## 2018-04-09 PROCEDURE — 94761 N-INVAS EAR/PLS OXIMETRY MLT: CPT

## 2018-04-09 PROCEDURE — 88307 TISSUE EXAM BY PATHOLOGIST: CPT | Mod: 26,,, | Performed by: PATHOLOGY

## 2018-04-09 PROCEDURE — 25000003 PHARM REV CODE 250: Performed by: NURSE ANESTHETIST, CERTIFIED REGISTERED

## 2018-04-09 PROCEDURE — 99900035 HC TECH TIME PER 15 MIN (STAT)

## 2018-04-09 PROCEDURE — 71000039 HC RECOVERY, EACH ADD'L HOUR: Performed by: OBSTETRICS & GYNECOLOGY

## 2018-04-09 PROCEDURE — 27201423 OPTIME MED/SURG SUP & DEVICES STERILE SUPPLY: Performed by: OBSTETRICS & GYNECOLOGY

## 2018-04-09 PROCEDURE — 71000033 HC RECOVERY, INTIAL HOUR: Performed by: OBSTETRICS & GYNECOLOGY

## 2018-04-09 PROCEDURE — 94799 UNLISTED PULMONARY SVC/PX: CPT

## 2018-04-09 PROCEDURE — 37000009 HC ANESTHESIA EA ADD 15 MINS: Performed by: OBSTETRICS & GYNECOLOGY

## 2018-04-09 PROCEDURE — 36000711: Performed by: OBSTETRICS & GYNECOLOGY

## 2018-04-09 PROCEDURE — 63600175 PHARM REV CODE 636 W HCPCS: Mod: JG | Performed by: ANESTHESIOLOGY

## 2018-04-09 PROCEDURE — P9045 ALBUMIN (HUMAN), 5%, 250 ML: HCPCS | Mod: JG | Performed by: ANESTHESIOLOGY

## 2018-04-09 PROCEDURE — 58571 TLH W/T/O 250 G OR LESS: CPT | Mod: ,,, | Performed by: OBSTETRICS & GYNECOLOGY

## 2018-04-09 PROCEDURE — 25000003 PHARM REV CODE 250: Performed by: STUDENT IN AN ORGANIZED HEALTH CARE EDUCATION/TRAINING PROGRAM

## 2018-04-09 PROCEDURE — 25000003 PHARM REV CODE 250: Performed by: OBSTETRICS & GYNECOLOGY

## 2018-04-09 PROCEDURE — 63600175 PHARM REV CODE 636 W HCPCS: Performed by: OBSTETRICS & GYNECOLOGY

## 2018-04-09 PROCEDURE — 63600175 PHARM REV CODE 636 W HCPCS: Performed by: ANESTHESIOLOGY

## 2018-04-09 PROCEDURE — 88342 IMHCHEM/IMCYTCHM 1ST ANTB: CPT | Performed by: PATHOLOGY

## 2018-04-09 PROCEDURE — 37000008 HC ANESTHESIA 1ST 15 MINUTES: Performed by: OBSTETRICS & GYNECOLOGY

## 2018-04-09 PROCEDURE — C1765 ADHESION BARRIER: HCPCS | Performed by: OBSTETRICS & GYNECOLOGY

## 2018-04-09 PROCEDURE — 36000710: Performed by: OBSTETRICS & GYNECOLOGY

## 2018-04-09 PROCEDURE — 25000003 PHARM REV CODE 250: Performed by: ANESTHESIOLOGY

## 2018-04-09 PROCEDURE — 88307 TISSUE EXAM BY PATHOLOGIST: CPT | Performed by: PATHOLOGY

## 2018-04-09 DEVICE — MESH PELV UPSYLON BLUE Y SHAPE: Type: IMPLANTABLE DEVICE | Site: PELVIS | Status: FUNCTIONAL

## 2018-04-09 DEVICE — BARRIER INTERCEED 3X4 ST DIS: Type: IMPLANTABLE DEVICE | Site: PELVIS | Status: FUNCTIONAL

## 2018-04-09 RX ORDER — CEFAZOLIN SODIUM 1 G/3ML
2 INJECTION, POWDER, FOR SOLUTION INTRAMUSCULAR; INTRAVENOUS
Status: COMPLETED | OUTPATIENT
Start: 2018-04-09 | End: 2018-04-09

## 2018-04-09 RX ORDER — METOCLOPRAMIDE HYDROCHLORIDE 5 MG/ML
5 INJECTION INTRAMUSCULAR; INTRAVENOUS EVERY 6 HOURS PRN
Status: DISCONTINUED | OUTPATIENT
Start: 2018-04-09 | End: 2018-04-10 | Stop reason: HOSPADM

## 2018-04-09 RX ORDER — HYDROCODONE BITARTRATE AND ACETAMINOPHEN 10; 325 MG/1; MG/1
1 TABLET ORAL EVERY 4 HOURS PRN
Status: DISCONTINUED | OUTPATIENT
Start: 2018-04-09 | End: 2018-04-10 | Stop reason: HOSPADM

## 2018-04-09 RX ORDER — ROCURONIUM BROMIDE 10 MG/ML
INJECTION, SOLUTION INTRAVENOUS
Status: DISCONTINUED | OUTPATIENT
Start: 2018-04-09 | End: 2018-04-09

## 2018-04-09 RX ORDER — DEXTROSE MONOHYDRATE, SODIUM CHLORIDE, AND POTASSIUM CHLORIDE 50; 1.49; 9 G/1000ML; G/1000ML; G/1000ML
INJECTION, SOLUTION INTRAVENOUS CONTINUOUS
Status: DISCONTINUED | OUTPATIENT
Start: 2018-04-09 | End: 2018-04-10 | Stop reason: HOSPADM

## 2018-04-09 RX ORDER — MEPERIDINE HYDROCHLORIDE 50 MG/ML
25 INJECTION INTRAMUSCULAR; INTRAVENOUS; SUBCUTANEOUS
Status: DISCONTINUED | OUTPATIENT
Start: 2018-04-09 | End: 2018-04-10 | Stop reason: HOSPADM

## 2018-04-09 RX ORDER — NEOSTIGMINE METHYLSULFATE 1 MG/ML
INJECTION, SOLUTION INTRAVENOUS
Status: DISCONTINUED | OUTPATIENT
Start: 2018-04-09 | End: 2018-04-09

## 2018-04-09 RX ORDER — SODIUM CHLORIDE, SODIUM LACTATE, POTASSIUM CHLORIDE, CALCIUM CHLORIDE 600; 310; 30; 20 MG/100ML; MG/100ML; MG/100ML; MG/100ML
INJECTION, SOLUTION INTRAVENOUS CONTINUOUS
Status: DISCONTINUED | OUTPATIENT
Start: 2018-04-09 | End: 2018-04-10 | Stop reason: HOSPADM

## 2018-04-09 RX ORDER — OXYCODONE HYDROCHLORIDE 5 MG/1
5 TABLET ORAL
Status: DISCONTINUED | OUTPATIENT
Start: 2018-04-09 | End: 2018-04-09 | Stop reason: HOSPADM

## 2018-04-09 RX ORDER — PHENYLEPHRINE HYDROCHLORIDE 10 MG/ML
INJECTION INTRAVENOUS
Status: DISCONTINUED | OUTPATIENT
Start: 2018-04-09 | End: 2018-04-09

## 2018-04-09 RX ORDER — LIDOCAINE HYDROCHLORIDE 10 MG/ML
0.5 INJECTION, SOLUTION EPIDURAL; INFILTRATION; INTRACAUDAL; PERINEURAL ONCE
Status: DISCONTINUED | OUTPATIENT
Start: 2018-04-09 | End: 2018-04-09

## 2018-04-09 RX ORDER — DIPHENHYDRAMINE HCL 25 MG
25 CAPSULE ORAL EVERY 4 HOURS PRN
Status: DISCONTINUED | OUTPATIENT
Start: 2018-04-09 | End: 2018-04-10 | Stop reason: HOSPADM

## 2018-04-09 RX ORDER — HYDROMORPHONE HYDROCHLORIDE 2 MG/ML
0.4 INJECTION, SOLUTION INTRAMUSCULAR; INTRAVENOUS; SUBCUTANEOUS EVERY 5 MIN PRN
Status: DISCONTINUED | OUTPATIENT
Start: 2018-04-09 | End: 2018-04-09 | Stop reason: HOSPADM

## 2018-04-09 RX ORDER — MEPERIDINE HYDROCHLORIDE 50 MG/ML
12.5 INJECTION INTRAMUSCULAR; INTRAVENOUS; SUBCUTANEOUS ONCE AS NEEDED
Status: DISCONTINUED | OUTPATIENT
Start: 2018-04-09 | End: 2018-04-09 | Stop reason: HOSPADM

## 2018-04-09 RX ORDER — ONDANSETRON 2 MG/ML
4 INJECTION INTRAMUSCULAR; INTRAVENOUS DAILY PRN
Status: DISCONTINUED | OUTPATIENT
Start: 2018-04-09 | End: 2018-04-09 | Stop reason: HOSPADM

## 2018-04-09 RX ORDER — FENTANYL CITRATE 50 UG/ML
INJECTION, SOLUTION INTRAMUSCULAR; INTRAVENOUS
Status: DISCONTINUED | OUTPATIENT
Start: 2018-04-09 | End: 2018-04-09

## 2018-04-09 RX ORDER — GLYCOPYRROLATE 0.2 MG/ML
INJECTION INTRAMUSCULAR; INTRAVENOUS
Status: DISCONTINUED | OUTPATIENT
Start: 2018-04-09 | End: 2018-04-09

## 2018-04-09 RX ORDER — LIDOCAINE HCL/PF 100 MG/5ML
SYRINGE (ML) INTRAVENOUS
Status: DISCONTINUED | OUTPATIENT
Start: 2018-04-09 | End: 2018-04-09

## 2018-04-09 RX ORDER — SIMETHICONE 80 MG
80 TABLET,CHEWABLE ORAL EVERY 4 HOURS PRN
Status: DISCONTINUED | OUTPATIENT
Start: 2018-04-09 | End: 2018-04-10 | Stop reason: HOSPADM

## 2018-04-09 RX ORDER — METHYLENE BLUE 10 MG/ML
INJECTION INTRAVENOUS
Status: DISCONTINUED | OUTPATIENT
Start: 2018-04-09 | End: 2018-04-09

## 2018-04-09 RX ORDER — ONDANSETRON 2 MG/ML
INJECTION INTRAMUSCULAR; INTRAVENOUS
Status: DISCONTINUED | OUTPATIENT
Start: 2018-04-09 | End: 2018-04-09

## 2018-04-09 RX ORDER — ENOXAPARIN SODIUM 100 MG/ML
40 INJECTION SUBCUTANEOUS DAILY
Status: DISCONTINUED | OUTPATIENT
Start: 2018-04-09 | End: 2018-04-10 | Stop reason: HOSPADM

## 2018-04-09 RX ORDER — ACETAMINOPHEN 10 MG/ML
INJECTION, SOLUTION INTRAVENOUS
Status: DISCONTINUED | OUTPATIENT
Start: 2018-04-09 | End: 2018-04-09

## 2018-04-09 RX ORDER — IBUPROFEN 600 MG/1
600 TABLET ORAL EVERY 6 HOURS
Status: DISCONTINUED | OUTPATIENT
Start: 2018-04-09 | End: 2018-04-10 | Stop reason: HOSPADM

## 2018-04-09 RX ORDER — HYDROCODONE BITARTRATE AND ACETAMINOPHEN 5; 325 MG/1; MG/1
1 TABLET ORAL EVERY 4 HOURS PRN
Status: DISCONTINUED | OUTPATIENT
Start: 2018-04-09 | End: 2018-04-10 | Stop reason: HOSPADM

## 2018-04-09 RX ORDER — SENNOSIDES 8.6 MG/1
8.6 TABLET ORAL NIGHTLY
Status: DISCONTINUED | OUTPATIENT
Start: 2018-04-09 | End: 2018-04-10 | Stop reason: HOSPADM

## 2018-04-09 RX ORDER — SODIUM CHLORIDE 0.9 % (FLUSH) 0.9 %
3 SYRINGE (ML) INJECTION
Status: DISCONTINUED | OUTPATIENT
Start: 2018-04-09 | End: 2018-04-09

## 2018-04-09 RX ORDER — ONDANSETRON 8 MG/1
8 TABLET, ORALLY DISINTEGRATING ORAL EVERY 8 HOURS PRN
Status: DISCONTINUED | OUTPATIENT
Start: 2018-04-09 | End: 2018-04-10 | Stop reason: HOSPADM

## 2018-04-09 RX ORDER — PROPOFOL 10 MG/ML
VIAL (ML) INTRAVENOUS
Status: DISCONTINUED | OUTPATIENT
Start: 2018-04-09 | End: 2018-04-09

## 2018-04-09 RX ORDER — MIDAZOLAM HYDROCHLORIDE 1 MG/ML
INJECTION INTRAMUSCULAR; INTRAVENOUS
Status: DISCONTINUED | OUTPATIENT
Start: 2018-04-09 | End: 2018-04-09

## 2018-04-09 RX ORDER — SODIUM CHLORIDE, SODIUM LACTATE, POTASSIUM CHLORIDE, CALCIUM CHLORIDE 600; 310; 30; 20 MG/100ML; MG/100ML; MG/100ML; MG/100ML
INJECTION, SOLUTION INTRAVENOUS CONTINUOUS
Status: DISCONTINUED | OUTPATIENT
Start: 2018-04-09 | End: 2018-04-09

## 2018-04-09 RX ORDER — ALBUMIN HUMAN 50 G/1000ML
25 SOLUTION INTRAVENOUS ONCE
Status: COMPLETED | OUTPATIENT
Start: 2018-04-09 | End: 2018-04-09

## 2018-04-09 RX ORDER — FENTANYL CITRATE 50 UG/ML
25 INJECTION, SOLUTION INTRAMUSCULAR; INTRAVENOUS EVERY 5 MIN PRN
Status: DISCONTINUED | OUTPATIENT
Start: 2018-04-09 | End: 2018-04-09 | Stop reason: HOSPADM

## 2018-04-09 RX ORDER — DEXAMETHASONE SODIUM PHOSPHATE 4 MG/ML
INJECTION, SOLUTION INTRA-ARTICULAR; INTRALESIONAL; INTRAMUSCULAR; INTRAVENOUS; SOFT TISSUE
Status: DISCONTINUED | OUTPATIENT
Start: 2018-04-09 | End: 2018-04-09

## 2018-04-09 RX ORDER — LEVOTHYROXINE SODIUM 75 UG/1
75 TABLET ORAL DAILY
Status: DISCONTINUED | OUTPATIENT
Start: 2018-04-10 | End: 2018-04-10 | Stop reason: HOSPADM

## 2018-04-09 RX ADMIN — NEOSTIGMINE METHYLSULFATE 4 MG: 1 INJECTION INTRAVENOUS at 01:04

## 2018-04-09 RX ADMIN — PHENYLEPHRINE HYDROCHLORIDE 100 MCG: 10 INJECTION INTRAVENOUS at 11:04

## 2018-04-09 RX ADMIN — ROCURONIUM BROMIDE 10 MG: 10 INJECTION INTRAVENOUS at 11:04

## 2018-04-09 RX ADMIN — ONDANSETRON 4 MG: 2 INJECTION INTRAMUSCULAR; INTRAVENOUS at 01:04

## 2018-04-09 RX ADMIN — PROPOFOL 20 MG: 10 INJECTION, EMULSION INTRAVENOUS at 01:04

## 2018-04-09 RX ADMIN — EPHEDRINE SULFATE 10 MG: 50 INJECTION INTRAMUSCULAR; INTRAVENOUS; SUBCUTANEOUS at 12:04

## 2018-04-09 RX ADMIN — LIDOCAINE HYDROCHLORIDE 50 MG: 20 INJECTION, SOLUTION INTRAVENOUS at 09:04

## 2018-04-09 RX ADMIN — PHENYLEPHRINE HYDROCHLORIDE 100 MCG: 10 INJECTION INTRAVENOUS at 10:04

## 2018-04-09 RX ADMIN — GLYCOPYRROLATE 0.2 MG: 0.2 INJECTION, SOLUTION INTRAMUSCULAR; INTRAVENOUS at 11:04

## 2018-04-09 RX ADMIN — ENOXAPARIN SODIUM 40 MG: 100 INJECTION SUBCUTANEOUS at 07:04

## 2018-04-09 RX ADMIN — PROPOFOL 80 MG: 10 INJECTION, EMULSION INTRAVENOUS at 09:04

## 2018-04-09 RX ADMIN — ALBUMIN (HUMAN) 25 G: 12.5 SOLUTION INTRAVENOUS at 03:04

## 2018-04-09 RX ADMIN — FENTANYL CITRATE 25 MCG: 50 INJECTION, SOLUTION INTRAMUSCULAR; INTRAVENOUS at 02:04

## 2018-04-09 RX ADMIN — FENTANYL CITRATE 50 MCG: 50 INJECTION, SOLUTION INTRAMUSCULAR; INTRAVENOUS at 11:04

## 2018-04-09 RX ADMIN — FENTANYL CITRATE 25 MCG: 50 INJECTION, SOLUTION INTRAMUSCULAR; INTRAVENOUS at 04:04

## 2018-04-09 RX ADMIN — CEFAZOLIN 2 G: 330 INJECTION, POWDER, FOR SOLUTION INTRAMUSCULAR; INTRAVENOUS at 09:04

## 2018-04-09 RX ADMIN — ACETAMINOPHEN 1000 MG: 10 INJECTION, SOLUTION INTRAVENOUS at 10:04

## 2018-04-09 RX ADMIN — ROCURONIUM BROMIDE 10 MG: 10 INJECTION INTRAVENOUS at 10:04

## 2018-04-09 RX ADMIN — IBUPROFEN 600 MG: 600 TABLET, FILM COATED ORAL at 11:04

## 2018-04-09 RX ADMIN — IBUPROFEN 600 MG: 600 TABLET, FILM COATED ORAL at 07:04

## 2018-04-09 RX ADMIN — FENTANYL CITRATE 50 MCG: 50 INJECTION, SOLUTION INTRAMUSCULAR; INTRAVENOUS at 09:04

## 2018-04-09 RX ADMIN — ROCURONIUM BROMIDE 5 MG: 10 INJECTION INTRAVENOUS at 10:04

## 2018-04-09 RX ADMIN — SODIUM CHLORIDE, SODIUM LACTATE, POTASSIUM CHLORIDE, AND CALCIUM CHLORIDE: 600; 310; 30; 20 INJECTION, SOLUTION INTRAVENOUS at 10:04

## 2018-04-09 RX ADMIN — PHENYLEPHRINE HYDROCHLORIDE 50 MCG: 10 INJECTION INTRAVENOUS at 09:04

## 2018-04-09 RX ADMIN — MIDAZOLAM HYDROCHLORIDE 2 MG: 1 INJECTION, SOLUTION INTRAMUSCULAR; INTRAVENOUS at 09:04

## 2018-04-09 RX ADMIN — SODIUM CHLORIDE, SODIUM LACTATE, POTASSIUM CHLORIDE, AND CALCIUM CHLORIDE: .6; .31; .03; .02 INJECTION, SOLUTION INTRAVENOUS at 05:04

## 2018-04-09 RX ADMIN — CEFAZOLIN 2 G: 330 INJECTION, POWDER, FOR SOLUTION INTRAMUSCULAR; INTRAVENOUS at 02:04

## 2018-04-09 RX ADMIN — ROCURONIUM BROMIDE 35 MG: 10 INJECTION INTRAVENOUS at 09:04

## 2018-04-09 RX ADMIN — OXYCODONE HYDROCHLORIDE 5 MG: 5 TABLET ORAL at 02:04

## 2018-04-09 RX ADMIN — METHYLENE BLUE 50 MG: 10 INJECTION, SOLUTION INTRAVENOUS at 01:04

## 2018-04-09 RX ADMIN — GLYCOPYRROLATE 0.6 MG: 0.2 INJECTION, SOLUTION INTRAMUSCULAR; INTRAVENOUS at 01:04

## 2018-04-09 RX ADMIN — CARBOXYMETHYLCELLULOSE SODIUM 2 DROP: 2.5 SOLUTION/ DROPS OPHTHALMIC at 09:04

## 2018-04-09 RX ADMIN — FENTANYL CITRATE 25 MCG: 50 INJECTION, SOLUTION INTRAMUSCULAR; INTRAVENOUS at 01:04

## 2018-04-09 RX ADMIN — SENNOSIDES 8.6 MG: 8.6 TABLET, FILM COATED ORAL at 09:04

## 2018-04-09 RX ADMIN — DEXAMETHASONE SODIUM PHOSPHATE 4 MG: 4 INJECTION, SOLUTION INTRAMUSCULAR; INTRAVENOUS at 10:04

## 2018-04-09 RX ADMIN — SIMETHICONE CHEW TAB 80 MG 80 MG: 80 TABLET ORAL at 07:04

## 2018-04-09 RX ADMIN — PHENYLEPHRINE HYDROCHLORIDE 100 MCG: 10 INJECTION INTRAVENOUS at 01:04

## 2018-04-09 RX ADMIN — SODIUM CHLORIDE, SODIUM LACTATE, POTASSIUM CHLORIDE, AND CALCIUM CHLORIDE: 600; 310; 30; 20 INJECTION, SOLUTION INTRAVENOUS at 08:04

## 2018-04-09 NOTE — ASSESSMENT & PLAN NOTE
Postoperative care  - Pain control with ibuprofen q6, norco prn and IV demerol for breakthrough pain  - Clear liquid diet, advance as tolerated to regular  - Zofran/reglan prn  - Maintain ayala catheter overnight, plan for active void trial in AM  - Simethicone prn. Senna nightly.  - Encourage ambulation  - TEDs/SCDs, lovenox for DVT ppx  - CBC in AM

## 2018-04-09 NOTE — INTERVAL H&P NOTE
The patient has been examined and the H&P has been reviewed:    I concur with the findings and no changes have occurred since H&P was written.      Active Hospital Problems    Diagnosis  POA    *Uterovaginal prolapse, incomplete [N81.2]  Yes    Posterior vaginal wall prolapse [N81.6]  Yes    Prolapse of anterior vaginal wall [N81.10]  Yes      Resolved Hospital Problems    Diagnosis Date Resolved POA   No resolved problems to display.     Gita Bocanegra MD  OBGYN - PGY 3

## 2018-04-09 NOTE — OP NOTE
OPERATIVE REPORT     DATE: 04/09/2018      PREOPERATIVE DIAGNOSIS  1. Symptomatic uterovaginal prolapse      POSTOPERATIVE DIAGNOSIS  same     PROCEDURE:  1. Total Laparoscopic Hysterectomy  2. Bilateral salpingectomy-oophorectomy  3. Laparoscopic sacrocolpopexy per Dr. Castellanos  4. Cystoscopy per Dr. Castellanos     SURGEON: Magnolia Zavala MD     ASSISTANTS:  Maxx Castellanos, DO Gita Bocanegra MD -resident     ANESTHESIA: General     COMPLICATIONS: None     EBL: 25 cc     IV FLUIDS: 2000 cc     URINE OUTPUT: 550 cc     FINDINGS:   - Small mobile uterus sounds to 7 cm. Uterine prolapse noted to the introitus.  - Normal bilateral tubes and ovaries  - Normal abdominal survey  - Cystoscopy performed at completion of case. Moderate amount of petechiae noted along bladder dome, no defects or sutures noted. Good efflux noted from bilateral ureteral orifices.      PROCEDURE IN DETAIL:  Patient was taken to the operating room where general anesthesia was administered and found to be adequate. She was prepped and draped in the dorsal lithotomy position using Jeffrey stirrups. A surgical timeout was performed with patient's name, date of birth, allergies, and procedure to be performed verbalized. All OR staff were in agreement. Preoperative antibiotics ancef 2g were administered.    Attention was then turned to the vagina. Patel was placed. A weighted sterile speculum and right angle retractor were placed in the vagina. The anterior lip of the cervix was grasped with a single tooth tenaculum. The uterus was sounded to approximately 6.5-7 cm. A stay suture of 0-Vicryl was placed at 12 o'clock and 6 o'clock on the cervix. A 6 cm GER manipulator (with medium cervical cup) was placed inside the endometrial cavity. Balloon occluder was inflated.     Gloves were changed. A 10 mm infraumbilical skin incision was made with the scalpel. A Veress needle was inserted into the umbilicus under tenting of the anterior abdominal  wall. Placement into the peritoneal cavity was confirmed via saline drop test. The abdomen was insufflated to 15mm Hg using Carbon dioxide. A 10 mm Optiview trocar was advanced through this incision. Excellent hemostasis was noted. The patient was placed in deep Trendelenburg. A 5 mm left lateral skin incision was made with a scalpel and a 5 mm trocar was advanced through this incision under visualization of the camera. A 5 mm right lateral skin incision was made with the scalpel. A 5 mm trocar was advanced through this incision under visualization of the camera. Excellent hemostasis was noted.      Survey was taken of the pelvis.  Bilateral ureters were seen crossing over the pelvic brim.  Attention was turned to the left round ligament. This was cauterized and transected with the scissors and dissection was continued anteriorly along the anterior leaf of the broad ligament.  The posterior leaf of the broad ligament was opened.  The left ureter was identified and noted to be well out of the operative field. The left infundibulopelvic ligament was identified, isolated, doubly cauterized using the Ligasure, and transected.  Attention was turned to the right round ligament. It was cauterized and transected with the scissors, and the anterior leaf of the broad ligament was opened.  The posterior leaf of the broad ligament was also opened with the scissors.  The right ureter was identified and noted to be well out of the operative field.  An avascular window was made the anterior leaf of the broad ligament.  The right infundibulopelvic ligament was identified, isolated, and doubly cauterized using the Ligasure, and transected.  The bladder was then dissected off of the anterior cervix and cup with sharp dissection using the scissors.  The uterine vessels were then skeletonized with the Ligasure on both sides.  The uterine vessels were doubly cauterized at the level of the cervical cup, but not transected bilaterally.  Attention was turned to the anterior portion of the cervix. The bladder was further dissected off the cervix with the scissors, and anterior colpotomy was created with the spatula.  Attention was turned to the posterior portion of the uterus. The posterior colpotomy was created with the spatula, and carried around to the level of the uterine vessels bilaterally. The uterine vessels were transected with the Ligasure.  The anterior and posterior colpotomies were then connected with the spatula.     The uterus, cervix, ovaries, and tubes were removed vaginally. Sponge containing a laparotomy sponge was placed in the vagina to maintain pneumoperitoneum. Dr. Castellanos then took over the case for vaginal cuff closure, sacrocolpopexy, and cystoscopy, which were performed without complication. Please see separate Op note for details.    Sponge, lap, and needle counts were correct x 2. The patient was taken to the recovery room in stable condition with the ayala draining urine at the completion of all procedures.      Gita Bocanegra MD  OBGYN - PGY 3

## 2018-04-09 NOTE — BRIEF OP NOTE
[facility]  Brief Operative Note    SUMMARY     Surgery Date: 4/9/2018     Surgeon(s) and Role:  Panel 1:     * Gita Bocanegra MD - Resident - Assisting     * Magnolia Zavala MD -Primary     * Maxx Castellanos DO     Panel 2:     * Maxx Castellanos DO - Primary    Pre-op Diagnosis:  Incomplete uterovaginal prolapse [N81.2]    Post-op Diagnosis:  Post-Op Diagnosis Codes:     * Incomplete uterovaginal prolapse [N81.2]    Procedure(s) (LRB):  HYSTERECTOMY-TOTAL LAPAROSCOPIC (TLH) (N/A)  COLPOPEXY, laparoscopic  (N/A)  CYSTOSCOPY (N/A)  [id number] (Bilateral)    Anesthesia: General    Description of the findings of the procedure:   - Small mobile uterus sounds to 7 cm. Uterine prolapse noted to the introitus.  - Normal bilateral tubes and ovaries  - Normal abdominal survey  - Cystoscopy performed at completion of case. Moderate amount of petechiae noted along bladder dome, no defects or sutures noted. Good efflux noted from bilateral ureteral orifices.    Estimated Blood Loss: 25 mL         Specimens:   Specimen (12h ago through future)    Start     Ordered    04/09/18 1415  Specimen to Pathology - Surgery  Once     Comments:  1.  UTERUS, CERVIX, BILATERAL FALLOPIAN TUBES AND OVARIES      04/09/18 1415        Gita Bocanegra MD  OBGYN - PGY 3

## 2018-04-09 NOTE — ANESTHESIA POSTPROCEDURE EVALUATION
"Anesthesia Post Evaluation    Patient: Sury Gao    Procedure(s) Performed: Procedure(s) (LRB):  HYSTERECTOMY-TOTAL LAPAROSCOPIC (TLH) (N/A)  COLPOPEXY, laparoscopic  (N/A)  CYSTOSCOPY (N/A)  [id number] (Bilateral)    Final Anesthesia Type: general  Patient location during evaluation: PACU  Patient participation: Yes- Able to Participate  Level of consciousness: awake and alert and oriented  Post-procedure vital signs: reviewed and stable  Pain management: adequate  Airway patency: patent  PONV status at discharge: No PONV  Anesthetic complications: no      Cardiovascular status: blood pressure returned to baseline and hemodynamically stable (Returned to baseline in 90's)  Respiratory status: unassisted  Hydration status: euvolemic  Follow-up not needed.        Visit Vitals  BP (!) 89/55   Pulse 80   Temp 36.9 °C (98.5 °F) (Oral)   Resp 18   Ht 5' 4" (1.626 m)   Wt 68 kg (150 lb)   SpO2 (!) 94%   Breastfeeding? No   BMI 25.75 kg/m²       Pain/Kayla Score: Pain Assessment Performed: Yes (4/9/2018  2:16 PM)  Presence of Pain: non-verbal indicators absent (4/9/2018  4:05 PM)  Pain Rating Prior to Med Admin: 6 (4/9/2018  4:18 PM)  Pain Rating Post Med Admin: 2 (4/9/2018  5:05 PM)  Kayla Score: 9 (4/9/2018  5:05 PM)      "

## 2018-04-09 NOTE — TRANSFER OF CARE
"Anesthesia Transfer of Care Note    Patient: Sury Gao    Procedure(s) Performed: Procedure(s) (LRB):  HYSTERECTOMY-TOTAL LAPAROSCOPIC (TLH) (N/A)  COLPOPEXY, laparoscopic  (N/A)  CYSTOSCOPY (N/A)  [id number] (Bilateral)    Patient location: PACU    Anesthesia Type: general    Transport from OR: Transported from OR on 2-3 L/min O2 by NC with adequate spontaneous ventilation    Post pain: adequate analgesia    Post assessment: no apparent anesthetic complications and tolerated procedure well    Post vital signs: stable    Level of consciousness: awake, alert and oriented    Nausea/Vomiting: no nausea/vomiting    Complications: none    Transfer of care protocol was followed      Last vitals:   Visit Vitals  BP (!) 108/59 (BP Location: Right arm, Patient Position: Lying)   Pulse 73   Temp 36.7 °C (98 °F) (Oral)   Resp 16   Ht 5' 4" (1.626 m)   Wt 68 kg (150 lb)   SpO2 95%   Breastfeeding? No   BMI 25.75 kg/m²     "

## 2018-04-09 NOTE — PROGRESS NOTES
[facility]  Obstetrics & Gynecology  Progress Note    Patient Name: Sury Gao  MRN: [id number]  Admission Date: 4/9/2018  Primary Care Provider: Aaron Ann MD  Principal Problem: S/P laparoscopic hysterectomy    Subjective:     HPI:  71 yo F with POP presents as scheduled for TLH/BSO/SCP/cysto. No complaints on admission.    Hospital course:  04/09/2018 - Patient underwent procedures without complication, see op notes for details.    Interval History: POD#0. Patient seen in PACU, no acute issues. Pain is adequately controlled with PO and IV medications. Tolerating ice chips/sips without nausea or vomiting. Has not ambulated or voided yet as ayala catheter in place. Denies flatus or BM. No other complaints at this time.      Scheduled Meds:   enoxaparin  40 mg Subcutaneous Daily    ibuprofen  600 mg Oral Q6H    [START ON 4/10/2018] levothyroxine  75 mcg Oral Daily     Continuous Infusions:   dextrose 5 % and 0.9 % NaCl with KCl 20 mEq      lactated ringers       PRN Meds:diphenhydrAMINE, fentaNYL, hydrocodone-acetaminophen 10-325mg, hydrocodone-acetaminophen 5-325mg, HYDROmorphone, meperidine, meperidine, metoclopramide HCl, ondansetron, ondansetron, oxyCODONE, simethicone    Review of patient's allergies indicates:  No Known Allergies    Objective:     Vital Signs (Most Recent):  Temp: 98.6 °F (37 °C) (04/09/18 1735)  Pulse: 82 (04/09/18 1735)  Resp: 16 (04/09/18 1735)  BP: (!) 92/51 (04/09/18 1735)  SpO2: (!) 94 % (04/09/18 1735) Vital Signs (24h Range):  Temp:  [97.5 °F (36.4 °C)-98.6 °F (37 °C)] 98.6 °F (37 °C)  Pulse:  [63-84] 82  Resp:  [16-18] 16  SpO2:  [92 %-95 %] 94 %  BP: ()/(50-64) 92/51     Weight: 68 kg (150 lb)  Body mass index is 25.75 kg/m².  No LMP recorded. Patient is postmenopausal.    I&O (Last 24H):    Intake/Output Summary (Last 24 hours) at 04/09/18 1747  Last data filed at 04/09/18 1700   Gross per 24 hour   Intake             3500 ml   Output               775 ml   Net             2725 ml       Physical Exam:   Constitutional: She is oriented to person, place, and time. She appears well-developed and well-nourished. No distress.       Cardiovascular: Normal rate and regular rhythm.     Pulmonary/Chest: Effort normal. No respiratory distress.        Abdominal: Soft. She exhibits abdominal incision (bandages in place, c/d/i). She exhibits no distension. There is tenderness (mild, appropriate). There is no rebound and no guarding.   Hypoactive bowel sounds     Genitourinary:   Genitourinary Comments: Ayala in place draining clear green urine           Musculoskeletal: Moves all extremeties. She exhibits no edema.   TEDs/SCDs in place       Neurological: She is alert and oriented to person, place, and time.    Skin: Skin is warm and dry.    Psychiatric: She has a normal mood and affect. Her behavior is normal.       Laboratory:  None new    Assessment/Plan:     * S/P TLH/BSO/SCP/cysto    Postoperative care  - Pain control with ibuprofen q6, norco prn and IV demerol for breakthrough pain  - Clear liquid diet, advance as tolerated to regular  - Zofran/reglan prn  - Maintain ayala catheter overnight, plan for active void trial in AM  - Simethicone prn. Senna nightly.  - Encourage ambulation  - TEDs/SCDs, lovenox for DVT ppx  - CBC in AM        Uterovaginal prolapse, incomplete    - indication for surgery, now resolved        Hypothyroidism    - continue home synthroid            Gita Bocanegra MD  Obstetrics & Gynecology  [facility]

## 2018-04-09 NOTE — MEDICAL/APP STUDENT
[facility]  Obstetrics & Gynecology  Progress Note    Patient Name: Sury Gao  MRN: [id number]  Admission Date: 2018  Primary Care Provider: Aaron Ann MD  Principal Problem: Uterovaginal prolapse, incomplete    Subjective:     Ms. Gao is 71 y/o F  post op day 1 from a TLH/BSO/SCP/cysto.    Pain is well controlled 3/10.  She denies current Fever and Chills. (subjective fevers overnight attributed to thermostat)  She has not ambulated,  Denies SOB/Dizziness on 3 L Nasal Canula.  Tolerating a Normal Diet w/out nausea or vomiting.  No Bowel Movements/Flatuance  Currently has Catheter    Objective:     Vitals:    4/10/18 0501 24 H Range   BP: (!) 72/41 (!) 72/41 - 128/64   Pulse: 65 63-84   Resp: -   Temp:  97.5-98.8 F   Sp02 95 92-96     18 I/O-Operatively   Ins- 3500  Out-1060  I/O Net 1060    Physical Exam   Cardiovascular: Normal rate, regular rhythm and normal heart sounds.    Pulmonary/Chest: Effort normal and breath sounds normal.   3 L Nasal Canula     Abdominal: Soft. Bowel sounds are normal. There is tenderness in the suprapubic area.       3 Laparoscopic Wounds c/d/i no erythema       Assessment/Plan:     Ms. Gao is the 71 y/o female post operative day 1 from from a TLH/BSO/SCP/cysto.    Plan-  Monitor Hypotension- continue fluids    Continue controlling Pain   Monitor I/O   Remove Catheter  Start Ambulating  Wean from O2    Discharge tomorrow    Valerie Esteves  Obstetrics & Gynecology  [facility]

## 2018-04-09 NOTE — HOSPITAL COURSE
04/09/2018 - Patient underwent procedures without complication, see op notes for details.  04/10/2018 - POD#1. Doing well. Tolerating regular diet. Pain controlled. Yet to void, ambulate.

## 2018-04-09 NOTE — OR NURSING
Rounded on patient prior to surgery.  Reviewed discharge planning, with verbalized understanding per patient and family.

## 2018-04-09 NOTE — OR NURSING
Spoke with Dr. Tovar concerning SBP 80's despite 600cc IVF.  Orders received. Pain level continues to be 6-7.  Holding pain med at this time until BP can be increased above 90 systolic.

## 2018-04-09 NOTE — H&P
SUBJECTIVE:   72 y.o. female   for preoperative gyn exam. No LMP recorded. Patient is postmenopausal..  She has symptomatic uterine prolapse that intereferes with her lifestyle.   Causes her pelvic pan and discomfort.  She desires surgical intevention.  Saw Dr. Castellanos  for sx, and desires hysterectomy with cuff suspension.             Past Medical History:   Diagnosis Date    Hypothyroidism      Nuclear sclerosis 2014    Thyroid disease       hypothyroid            Past Surgical History:   Procedure Laterality Date    Bilateral lower extremity varicose vein treatment         COLONOSCOPY N/A 2018    DILATION AND CURETTAGE OF UTERUS        gutierrez neuroma         left foot    MINI-LAPAROTOMY         D&C perforation    TONSILLECTOMY        WRIST SURGERY         left wrist: Swave-Kapunjay surgery      Social History            Social History    Marital status:        Spouse name: N/A    Number of children: N/A    Years of education: N/A          Occupational History    Not on file.             Social History Main Topics    Smoking status: Former Smoker    Smokeless tobacco: Never Used    Alcohol use Yes         Comment: social    Drug use: No    Sexual activity: Not Currently           Other Topics Concern    Not on file          Social History Narrative    No narrative on file             Family History   Problem Relation Age of Onset    Hypothyroidism Mother      Cataracts Mother      Heart disease Mother         Valvular heart disease    Cataracts Father      Macular degeneration Father      Hypothyroidism Sister      Hypothyroidism Daughter      Cancer Brother         testicular cancer    Amblyopia Neg Hx      Blindness Neg Hx      Glaucoma Neg Hx      Retinal detachment Neg Hx      Strabismus Neg Hx      Breast cancer Neg Hx      Colon cancer Neg Hx      Diabetes Neg Hx      Hypertension Neg Hx      Ovarian cancer Neg Hx      Cervical  cancer Neg Hx      Endometrial cancer Neg Hx      Vaginal cancer Neg Hx        OB History    Para Term  AB Living   4 3     1 3   SAB TAB Ectopic Multiple Live Births   1       3       # Outcome Date GA Lbr Bertin/2nd Weight Sex Delivery Anes PTL Lv   4 SAB                     3 Para                     2 Para                     1 Para                                         Current Outpatient Prescriptions   Medication Sig Dispense Refill    fexofenadine (ALLEGRA) 30 MG tablet Take 30 mg by mouth continuous prn.        loratadine (CLARITIN) 10 mg tablet Take 10 mg by mouth daily as needed.        SYNTHROID 75 mcg tablet TAKE 1 TABLET BY MOUTH DAILY 90 tablet 3    HYDROcodone-ibuprofen (VICOPROFEN) 5-200 mg per tablet Take 1 tablet by mouth every 8 (eight) hours as needed for Pain.        ibuprofen (ADVIL,MOTRIN) 800 MG tablet Take 1 tablet (800 mg total) by mouth every 6 (six) hours as needed for Pain. 30 tablet 1      No current facility-administered medications for this visit.                 Facility-Administered Medications Ordered in Other Visits   Medication Dose Route Frequency Provider Last Rate Last Dose    midazolam (VERSED) 1 mg/mL injection 5 mg  5 mg Intramuscular Once Pre-Op Jimbo Tovar MD        pregabalin capsule 150 mg  150 mg Oral Once Pre-Op Jimbo Tovar MD          Allergies: Patient has no known allergies.      ROS:  Constitutional: no weight loss, weight gain, fever, fatigue  Eyes:  No vision changes, glasses/contacts  ENT/Mouth: No ulcers, sinus problems, ears ringing, headache  Cardiovascular: No inability to lie flat, chest pain, exercise intolerance, swelling, heart palpitations  Respiratory: No wheezing, coughing blood, shortness of breath, or cough  Gastrointestinal: No diarrhea, bloody stool, nausea/vomiting, constipation, gas, hemorrhoids  Genitourinary: No blood in urine, painful urination, urgency of urination, frequency of urination, incomplete  "emptying, incontinence, abnormal bleeding, painful periods, heavy periods, vaginal discharge, vaginal odor, painful intercourse, sexual problems, bleeding after intercourse.  Musculoskeletal: No muscle weakness  Skin/Breast: No painful breasts, nipple discharge, masses, rash, ulcers  Neurological: No passing out, seizures, numbness, headache  Endocrine: No diabetes, hypothyroid, hyperthyroid, hot flashes, hair loss, abnormal hair growth, ance  Psychiatric: No depression, crying  Hematologic: No bruises, bleeding, swollen lymph nodes, anemia.        OBJECTIVE:   The patient appears well, alert, oriented x 3, in no distress.  /68   Ht 5' 4" (1.626 m)   Wt 68.2 kg (150 lb 5.7 oz)   BMI 25.81 kg/m²   NECK: no thyromegaly, trachea midline  SKIN: no acne, striae, hirsutism  CHEST: CTAB  CV: RRR  BREAST EXAM: Deferred  ABDOMEN: no hernias, masses, or hepatosplenomegaly  GENITALIA: normal external genitalia, no erythema, no discharge  URETHRA: normal urethra, normal urethral meatus  VAGINA: Normal  CERVIX: no lesions or cervical motion tenderness  UTERUS: normal size, non-tender  ADNEXA: no mass, fullness, tenderness        ASSESSMENT:   1. Preoperative exam for gynecologic surgery  Vaginosis Screen by DNA Probe   2. Uterovaginal prolapse, incomplete      3. Prolapse of anterior vaginal wall      4. Posterior vaginal wall prolapse            PLAN:       Orders Placed This Encounter    Vaginosis Screen by DNA Probe      Consents signed  TLH/ BSO.  Cuff suspension per Dr. Castellanos  Return to clinic for post op    "

## 2018-04-10 VITALS
WEIGHT: 148 LBS | HEART RATE: 64 BPM | SYSTOLIC BLOOD PRESSURE: 94 MMHG | TEMPERATURE: 98 F | DIASTOLIC BLOOD PRESSURE: 50 MMHG | BODY MASS INDEX: 25.27 KG/M2 | RESPIRATION RATE: 18 BRPM | HEIGHT: 64 IN | OXYGEN SATURATION: 96 %

## 2018-04-10 LAB
BASOPHILS # BLD AUTO: 0.01 K/UL
BASOPHILS NFR BLD: 0.1 %
DIFFERENTIAL METHOD: ABNORMAL
EOSINOPHIL # BLD AUTO: 0 K/UL
EOSINOPHIL NFR BLD: 0.1 %
ERYTHROCYTE [DISTWIDTH] IN BLOOD BY AUTOMATED COUNT: 13.2 %
HCT VFR BLD AUTO: 34.3 %
HGB BLD-MCNC: 10.9 G/DL
LYMPHOCYTES # BLD AUTO: 1.7 K/UL
LYMPHOCYTES NFR BLD: 18.3 %
MCH RBC QN AUTO: 30.4 PG
MCHC RBC AUTO-ENTMCNC: 31.8 G/DL
MCV RBC AUTO: 96 FL
MONOCYTES # BLD AUTO: 0.8 K/UL
MONOCYTES NFR BLD: 8 %
NEUTROPHILS # BLD AUTO: 6.9 K/UL
NEUTROPHILS NFR BLD: 73.3 %
PLATELET # BLD AUTO: 166 K/UL
PMV BLD AUTO: 11 FL
RBC # BLD AUTO: 3.59 M/UL
WBC # BLD AUTO: 9.42 K/UL

## 2018-04-10 PROCEDURE — 36415 COLL VENOUS BLD VENIPUNCTURE: CPT

## 2018-04-10 PROCEDURE — 25000003 PHARM REV CODE 250: Performed by: STUDENT IN AN ORGANIZED HEALTH CARE EDUCATION/TRAINING PROGRAM

## 2018-04-10 PROCEDURE — 85025 COMPLETE CBC W/AUTO DIFF WBC: CPT

## 2018-04-10 RX ADMIN — HYDROCODONE BITARTRATE AND ACETAMINOPHEN 1 TABLET: 10; 325 TABLET ORAL at 12:04

## 2018-04-10 RX ADMIN — SODIUM CHLORIDE, SODIUM LACTATE, POTASSIUM CHLORIDE, AND CALCIUM CHLORIDE: .6; .31; .03; .02 INJECTION, SOLUTION INTRAVENOUS at 05:04

## 2018-04-10 RX ADMIN — LEVOTHYROXINE SODIUM 75 MCG: 75 TABLET ORAL at 10:04

## 2018-04-10 RX ADMIN — IBUPROFEN 600 MG: 600 TABLET, FILM COATED ORAL at 05:04

## 2018-04-10 RX ADMIN — IBUPROFEN 600 MG: 600 TABLET, FILM COATED ORAL at 11:04

## 2018-04-10 NOTE — ASSESSMENT & PLAN NOTE
Postoperative care  - Pain control with ibuprofen q6, norco prn and IV demerol for breakthrough pain  - Clear liquid diet, advance as tolerated to regular  - Zofran/reglan prn  - Active void trial   - Simethicone prn. Senna nightly.  - Encourage ambulation  - TEDs/SCDs, lovenox for DVT ppx  - CBC stable

## 2018-04-10 NOTE — PLAN OF CARE
Resting comfortably in bed at this time.  VSS, hypotension is patients norm, on O2/NC/3L and afebrile this shift.  Lap sites x3 CDI, s drainage to marilee pad.  Tolerating pain on PO.    Advanced to regular diet, tray en route and good UOP this shift, dependent to Patel c Packing in place.  Repositions self independently in bed and has not ambulated, at this time      Free from injury or skin breakdown; Fall precautions maintained and call light in reach.  POC updated questions answered and comments acknowledged.  Purposeful hourly rounding completed this shift.

## 2018-04-10 NOTE — NURSING
IV removed from left forearm. Patient tolerate it well. Pt given discharge instructions. Patient verbalizes understanding. She does not have any questions or concerns at this time. Transportation with sister was provided. Pt is leaving by wheelchair with personal belongings.

## 2018-04-10 NOTE — PROGRESS NOTES
[facility]  Obstetrics & Gynecology  Progress Note    Patient Name: Sury Gao  MRN: [id number]  Admission Date: 4/9/2018  Primary Care Provider: Aaron Ann MD  Principal Problem: S/P laparoscopic hysterectomy    Subjective:     HPI:  71 yo F with POP presents as scheduled for TLH/BSO/SCP/cysto. No complaints on admission.    Hospital course:  04/09/2018 - Patient underwent procedures without complication, see op notes for details.  04/10/2018 - POD#1. Doing well. Tolerating regular diet. Pain controlled. Yet to void, ambulate.     Interval History: POD#1. Doing well. Pain controlled. Tolerating regular diet. Patel in place. Yet to ambulate or have flatus.    Scheduled Meds:   enoxaparin  40 mg Subcutaneous Daily    ibuprofen  600 mg Oral Q6H    levothyroxine  75 mcg Oral Daily    senna  8.6 mg Oral QHS     Continuous Infusions:   dextrose 5 % and 0.9 % NaCl with KCl 20 mEq Stopped (04/09/18 1900)    lactated ringers 125 mL/hr at 04/10/18 0500     PRN Meds:diphenhydrAMINE, hydrocodone-acetaminophen 10-325mg, hydrocodone-acetaminophen 5-325mg, meperidine, metoclopramide HCl, ondansetron, simethicone    Review of patient's allergies indicates:  No Known Allergies    Objective:     Vital Signs (Most Recent):  Temp: 98.8 °F (37.1 °C) (04/10/18 0443)  Pulse: 68 (04/10/18 0443)  Resp: 18 (04/10/18 0443)  BP: (!) 89/55 (04/10/18 0451)  SpO2: 95 % (04/10/18 0443) Vital Signs (24h Range):  Temp:  [97.8 °F (36.6 °C)-98.8 °F (37.1 °C)] 98.8 °F (37.1 °C)  Pulse:  [65-84] 68  Resp:  [16-18] 18  SpO2:  [92 %-96 %] 95 %  BP: ()/(42-59) 89/55     Weight: 67.1 kg (148 lb)  Body mass index is 25.4 kg/m².  No LMP recorded. Patient is postmenopausal.    I&O (Last 24H):    Intake/Output Summary (Last 24 hours) at 04/10/18 0705  Last data filed at 04/10/18 0600   Gross per 24 hour   Intake             3740 ml   Output             2260 ml   Net             1480 ml       Physical Exam:    Constitutional: She is oriented to person, place, and time. She appears well-developed and well-nourished. No distress.       Cardiovascular: Normal rate and regular rhythm.     Pulmonary/Chest: Effort normal. No respiratory distress.        Abdominal: Soft. She exhibits abdominal incision (bandages in place, c/d/i). She exhibits no distension. There is tenderness (mild, appropriate). There is no rebound and no guarding.   Hypoactive bowel sounds     Genitourinary:   Genitourinary Comments: Patel in place draining clear green urine  Vag pack removed- 1/4th saturated. Old blood.           Musculoskeletal: Moves all extremeties. She exhibits no edema.   TEDs/SCDs in place       Neurological: She is alert and oriented to person, place, and time.    Skin: Skin is warm and dry.    Psychiatric: She has a normal mood and affect. Her behavior is normal.       Laboratory:  None new    Assessment/Plan:     * S/P TLH/BSO/SCP/cysto    Postoperative care  - Pain control with ibuprofen q6, norco prn and IV demerol for breakthrough pain  - Clear liquid diet, advance as tolerated to regular  - Zofran/reglan prn  - Active void trial   - Simethicone prn. Senna nightly.  - Encourage ambulation  - TEDs/SCDs, lovenox for DVT ppx  - CBC stable         Uterovaginal prolapse, incomplete    - indication for surgery, now resolved        Hypothyroidism    - continue home synthroid            Allison Moreland MD  Obstetrics & Gynecology  [facility]

## 2018-04-10 NOTE — SUBJECTIVE & OBJECTIVE
Hospital course:  04/09/2018 - Patient underwent procedures without complication, see op notes for details.  04/10/2018 - POD#1. Doing well. Tolerating regular diet. Pain controlled. Yet to void, ambulate.     Interval History: POD#0. Patient seen in PACU, no acute issues. Pain is adequately controlled with PO and IV medications. Tolerating ice chips/sips without nausea or vomiting. Has not ambulated or voided yet as ayala catheter in place. Denies flatus or BM. No other complaints at this time.      Scheduled Meds:   enoxaparin  40 mg Subcutaneous Daily    ibuprofen  600 mg Oral Q6H    levothyroxine  75 mcg Oral Daily    senna  8.6 mg Oral QHS     Continuous Infusions:   dextrose 5 % and 0.9 % NaCl with KCl 20 mEq Stopped (04/09/18 1900)    lactated ringers 125 mL/hr at 04/10/18 0500     PRN Meds:diphenhydrAMINE, hydrocodone-acetaminophen 10-325mg, hydrocodone-acetaminophen 5-325mg, meperidine, metoclopramide HCl, ondansetron, simethicone    Review of patient's allergies indicates:  No Known Allergies    Objective:     Vital Signs (Most Recent):  Temp: 98.8 °F (37.1 °C) (04/10/18 0443)  Pulse: 68 (04/10/18 0443)  Resp: 18 (04/10/18 0443)  BP: (!) 89/55 (04/10/18 0451)  SpO2: 95 % (04/10/18 0443) Vital Signs (24h Range):  Temp:  [97.8 °F (36.6 °C)-98.8 °F (37.1 °C)] 98.8 °F (37.1 °C)  Pulse:  [65-84] 68  Resp:  [16-18] 18  SpO2:  [92 %-96 %] 95 %  BP: ()/(42-59) 89/55     Weight: 67.1 kg (148 lb)  Body mass index is 25.4 kg/m².  No LMP recorded. Patient is postmenopausal.    I&O (Last 24H):    Intake/Output Summary (Last 24 hours) at 04/10/18 0705  Last data filed at 04/10/18 0600   Gross per 24 hour   Intake             3740 ml   Output             2260 ml   Net             1480 ml       Physical Exam:   Constitutional: She is oriented to person, place, and time. She appears well-developed and well-nourished. No distress.       Cardiovascular: Normal rate and regular rhythm.     Pulmonary/Chest: Effort  normal. No respiratory distress.        Abdominal: Soft. She exhibits abdominal incision (bandages in place, c/d/i). She exhibits no distension. There is tenderness (mild, appropriate). There is no rebound and no guarding.   Hypoactive bowel sounds     Genitourinary:   Genitourinary Comments: Patel in place draining clear green urine  Vag pack removed- 1/4th saturated. Old blood.           Musculoskeletal: Moves all extremeties. She exhibits no edema.   TEDs/SCDs in place       Neurological: She is alert and oriented to person, place, and time.    Skin: Skin is warm and dry.    Psychiatric: She has a normal mood and affect. Her behavior is normal.       Laboratory:  None new

## 2018-04-10 NOTE — PLAN OF CARE
Problem: Patient Care Overview  Goal: Plan of Care Review  Outcome: Ongoing (interventions implemented as appropriate)  Patient on 3L nc. Sats 96%. Pt was eating, IS at bedside. Pt. in no distress, will continue to monitor.

## 2018-04-11 NOTE — DISCHARGE SUMMARY
[facility]  Obstetrics & Gynecology  Discharge Summary    Patient Name: Sury Gao  MRN: [id number]  Admission Date: 4/9/2018  Hospital Length of Stay: 0 days  Discharge Date and Time:  04/11/2018 7:04 AM  Attending Physician: No att. providers found   Discharging Provider: Allison Moreland MD  Primary Care Provider: Aaron Ann MD    HPI:  71 yo F with POP presents as scheduled for TLH/BSO/SCP/cysto. No complaints on admission.    Hospital Course:  04/09/2018 - Patient underwent procedures without complication, see op notes for details.  04/10/2018 - POD#1. Doing well. Tolerating regular diet. Pain controlled. Yet to void, ambulate.   Later POD#1 -- passed active void  Trial. Ambulated. Pain controlled. Tolerating diet. Discharge home.   Procedure(s) (LRB):  HYSTERECTOMY-TOTAL LAPAROSCOPIC (TLH) (N/A)  COLPOPEXY, laparoscopic  (N/A)  CYSTOSCOPY (N/A)  ADOSICRO-[id number] (Bilateral)         Significant Diagnostic Studies: Labs:   CBC   Recent Labs  Lab 04/10/18  0507   WBC 9.42   HGB 10.9*   HCT 34.3*          Pending Diagnostic Studies:     None        Final Active Diagnoses:    Diagnosis Date Noted POA    PRINCIPAL PROBLEM:  S/P TLH/BSO/SCP/cysto [Z90.710] 04/09/2018 No    Posterior vaginal wall prolapse [N81.6] 12/20/2017 Yes    Uterovaginal prolapse, incomplete [N81.2] 12/20/2017 Yes    Prolapse of anterior vaginal wall [N81.10] 12/20/2017 Yes    Hypothyroidism [E03.9] 09/18/2016 Yes      Problems Resolved During this Admission:    Diagnosis Date Noted Date Resolved POA        Discharged Condition: good    Disposition: Home or Self Care    Follow Up:  Follow-up Information     Maxx Castellanos DO In 2 weeks.    Specialties:  Urology, Gynecology  Why:  post op check  Contact information:  [address]  [place] [address] [phone]                 Patient Instructions:     Diet Adult Regular     Activity as tolerated     Other restrictions  (specify):   Order Comments: Nothing in vagina for 6 weeks.     Notify your health care provider if you experience any of the following:  increased confusion or weakness     Notify your health care provider if you experience any of the following:  persistent dizziness, light-headedness, or visual disturbances     Notify your health care provider if you experience any of the following:  worsening rash     Notify your health care provider if you experience any of the following:  severe persistent headache     Notify your health care provider if you experience any of the following:  difficulty breathing or increased cough     Notify your health care provider if you experience any of the following:  redness, tenderness, or signs of infection (pain, swelling, redness, odor or green/yellow discharge around incision site)     Notify your health care provider if you experience any of the following:  severe uncontrolled pain     Notify your health care provider if you experience any of the following:  persistent nausea and vomiting or diarrhea     Notify your health care provider if you experience any of the following:  temperature >100.4     No dressing needed   Order Comments: Please keep incision clean and dry. Wash daily with soap and water. Allow to air dry completely.       Medications:  Reconciled Home Medications:      Medication List      CONTINUE taking these medications    fexofenadine 30 MG tablet  Commonly known as:  ALLEGRA  Take 30 mg by mouth continuous prn.     HYDROcodone-ibuprofen 5-200 mg per tablet  Commonly known as:  VICOPROFEN  Take 1 tablet by mouth every 8 (eight) hours as needed for Pain.     ibuprofen 800 MG tablet  Commonly known as:  ADVIL,MOTRIN  Take 1 tablet (800 mg total) by mouth every 6 (six) hours as needed for Pain.        ASK your doctor about these medications    loratadine 10 mg tablet  Commonly known as:  CLARITIN  Take 10 mg by mouth daily as needed.     SYNTHROID 75 MCG  tablet  Generic drug:  levothyroxine  TAKE 1 TABLET BY MOUTH DAILY            Allison Moreland MD  Obstetrics & Gynecology  [facility]

## 2018-04-14 ENCOUNTER — PATIENT MESSAGE (OUTPATIENT)
Dept: UROGYNECOLOGY | Facility: CLINIC | Age: 73
End: 2018-04-14

## 2018-04-25 ENCOUNTER — OFFICE VISIT (OUTPATIENT)
Dept: UROGYNECOLOGY | Facility: CLINIC | Age: 73
End: 2018-04-25
Payer: COMMERCIAL

## 2018-04-25 VITALS
WEIGHT: 154.13 LBS | SYSTOLIC BLOOD PRESSURE: 110 MMHG | HEIGHT: 64 IN | BODY MASS INDEX: 26.31 KG/M2 | DIASTOLIC BLOOD PRESSURE: 60 MMHG

## 2018-04-25 DIAGNOSIS — N95.2 VAGINAL ATROPHY: ICD-10-CM

## 2018-04-25 DIAGNOSIS — G89.18 POST-OP PAIN: ICD-10-CM

## 2018-04-25 DIAGNOSIS — Z98.890 POST-OPERATIVE STATE: Primary | ICD-10-CM

## 2018-04-25 DIAGNOSIS — K59.01 SLOW TRANSIT CONSTIPATION: ICD-10-CM

## 2018-04-25 PROBLEM — N81.6 POSTERIOR VAGINAL WALL PROLAPSE: Status: RESOLVED | Noted: 2017-12-20 | Resolved: 2018-04-25

## 2018-04-25 PROBLEM — N81.10 PROLAPSE OF ANTERIOR VAGINAL WALL: Status: RESOLVED | Noted: 2017-12-20 | Resolved: 2018-04-25

## 2018-04-25 PROBLEM — N81.2 UTEROVAGINAL PROLAPSE, INCOMPLETE: Status: RESOLVED | Noted: 2017-12-20 | Resolved: 2018-04-25

## 2018-04-25 PROCEDURE — 99999 PR PBB SHADOW E&M-EST. PATIENT-LVL III: CPT | Mod: PBBFAC,,, | Performed by: NURSE PRACTITIONER

## 2018-04-25 PROCEDURE — 99024 POSTOP FOLLOW-UP VISIT: CPT | Mod: S$GLB,,, | Performed by: NURSE PRACTITIONER

## 2018-04-25 RX ORDER — DOCUSATE SODIUM 100 MG/1
100 CAPSULE, LIQUID FILLED ORAL 2 TIMES DAILY
Refills: 2 | COMMUNITY
Start: 2018-04-06 | End: 2018-10-08 | Stop reason: ALTCHOICE

## 2018-04-25 RX ORDER — POLYETHYLENE GLYCOL 3350 17 G/17G
17 POWDER, FOR SOLUTION ORAL DAILY
Qty: 60 PACKET | Refills: 2 | Status: SHIPPED | OUTPATIENT
Start: 2018-04-25 | End: 2018-07-13

## 2018-04-25 NOTE — PROGRESS NOTES
Urogyn follow up  04/25/2018    [facility]  [address] [address]    Sury Gao  [id number]  1945      Sury Gao is a 72 y.o. here for a urogyn follow up.    04/09/2018  PROCEDURE DATE:  04/09/2018     1. Total Laparoscopic Hysterectomy  2. Bilateral salpingectomy-oophorectomy  3. Laparoscopic sacrocolpopexy per Dr. Castellanos  4. Cystoscopy per Dr. Castellanos    Past Medical History:   Diagnosis Date    Hypothyroidism     Nuclear sclerosis 4/23/2014    Thyroid disease     hypothyroid       Past Surgical History:   Procedure Laterality Date    Bilateral lower extremity varicose vein treatment       COLONOSCOPY N/A 2/23/2018    DILATION AND CURETTAGE OF UTERUS      gutierrez neuroma      left foot    MINI-LAPAROTOMY      D&C perforation    TONSILLECTOMY      WRIST SURGERY      left wrist: Swave-Kapunjay surgery     History since last visit: Has mild suprapubic pain and rectal pressure prior to bowel movements.  Pain is improving.  Taking 400-600 mg ibuprofen three times/ day.  Bladder issues: Denies UI, urinary urgency, or urinary frequency. Voiding every 2 hours.  Nocturia 1/ night.  Bowel issues: Was initially constipated; it is improving.  Taking colace daily.      Medications:    Current Outpatient Prescriptions:     docusate sodium (COLACE) 100 MG capsule, Take 100 mg by mouth 2 (two) times daily., Disp: , Rfl: 2    fexofenadine (ALLEGRA) 30 MG tablet, Take 30 mg by mouth continuous prn., Disp: , Rfl:     loratadine (CLARITIN) 10 mg tablet, Take 10 mg by mouth daily as needed., Disp: , Rfl:     SYNTHROID 75 mcg tablet, TAKE 1 TABLET BY MOUTH DAILY, Disp: 90 tablet, Rfl: 3    HYDROcodone-ibuprofen (VICOPROFEN) 5-200 mg per tablet, Take 1 tablet by mouth every 8 (eight) hours as needed for Pain., Disp: , Rfl:     ibuprofen (ADVIL,MOTRIN) 800 MG tablet, Take 1 tablet (800 mg total) by mouth every 6 (six) hours as needed for  "Pain. (Patient taking differently: Take 400 mg by mouth every 6 (six) hours as needed for Pain. ), Disp: 30 tablet, Rfl: 1    polyethylene glycol (GLYCOLAX) 17 gram PwPk, Take 17 g by mouth once daily., Disp: 60 packet, Rfl: 2      ROS:  As per HPI.      Exam  /60 (BP Location: Right arm, Patient Position: Sitting, BP Method: Medium (Manual))   Ht 5' 4" (1.626 m)   Wt 69.9 kg (154 lb 1.6 oz)   BMI 26.45 kg/m²   General: alert and oriented, no acute distress  Respiratory: normal respiratory effort  Abd: soft, non-tender, non-distended    Pelvic  Ext. Genitalia: normal external genitalia. Normal bartholin's and skeens glands  Vagina: + atrophy. Normal vaginal mucosa without lesions. No discharge noted.   Incisions healing well.  Sutures intact. Non-tender bladder base without palpable mass.  Cervix: absent  Uterus:  absent   Urethra: no masses or tenderness  Urethral meatus: no lesions, caruncle or prolapse.  Rectal: normal, no masses.  + TTP by retroperitoneal suture placement.    Dr. Castellanos present during exam    Impression  1. Post-operative state     2. Slow transit constipation  polyethylene glycol (GLYCOLAX) 17 gram PwPk   3. Vaginal atrophy     4. Post-op pain       We reviewed the above issues and discussed options for short-term versus long-term management of her problems.   Plan:   1. Post op healing well.  Continue post op instructions  2. Start miralax 17 Gm daily  3. She will follow up with us in 4 weeks.  30 minutes were spent in face to face time with this patient  90 % of this time was spent in counseling and/or coordination of care     Sierra Dozier NP  [facility]  Division of Female Pelvic Medicine and Reconstructive Surgery  Department of Obstetrics & Gynecology  "

## 2018-04-25 NOTE — PATIENT INSTRUCTIONS
1. Post op healing well.  Continue post op instructions  2. Start miralax 17 Gm daily  3. She will follow up with us in 4 weeks.

## 2018-05-18 ENCOUNTER — TELEPHONE (OUTPATIENT)
Dept: OBSTETRICS AND GYNECOLOGY | Facility: CLINIC | Age: 73
End: 2018-05-18

## 2018-05-18 ENCOUNTER — NURSE TRIAGE (OUTPATIENT)
Dept: ADMINISTRATIVE | Facility: CLINIC | Age: 73
End: 2018-05-18

## 2018-05-18 NOTE — TELEPHONE ENCOUNTER
"I called patient to discuss concerns of noticing belly buttons has clear light bloody discharge,soreness and  Swelling. Pain 2/10 . I notified patient provider was at a conference and was not available today. Patient states " I am out of town and feel she could wait for post op appointment on 05/23/2018. I advised her to use 3 part water 1 part peroxide to clean the area, take NSAID Q 8 hours or as needed for pain." Keep area clean and dry" If symptoms persist go to the nearest ED. Called the office with any additional concerns or symptoms. She verbalized understanding  "

## 2018-05-18 NOTE — TELEPHONE ENCOUNTER
----- Message from Adeline Mcclain sent at 5/18/2018 10:44 AM CDT -----  Contact: Pt  Name of Who is Calling: ANGELO MCKEON [[id number]]      What is the request in detail: Patient states she is returning a call       Can the clinic reply by [facility]: No      [facility]: [phone]

## 2018-05-18 NOTE — TELEPHONE ENCOUNTER
Reason for Disposition   Nursing judgment    Protocols used: ST NO PROTOCOL CALL: INFORMATION ONLY-A-OH    Sury is calling to report a small bloody discharge from surgical site at umbilicus.  States no redness.  No fever. No foul odor.  Slight swelling and soreness at sight.  Please contact Sury to advise.

## 2018-05-19 ENCOUNTER — NURSE TRIAGE (OUTPATIENT)
Dept: ADMINISTRATIVE | Facility: CLINIC | Age: 73
End: 2018-05-19

## 2018-05-19 NOTE — TELEPHONE ENCOUNTER
Reason for Disposition   Sounds like a serious complication to the triager    Protocols used: ST POST-OP INCISION SYMPTOMS-A-AH    Pt calling requesting antibiotics for possible infection from surgical incision.  Pt states wound is red and discharge.  Care advice given

## 2018-05-21 ENCOUNTER — LAB VISIT (OUTPATIENT)
Dept: LAB | Facility: OTHER | Age: 73
End: 2018-05-21
Payer: COMMERCIAL

## 2018-05-21 ENCOUNTER — OFFICE VISIT (OUTPATIENT)
Dept: UROGYNECOLOGY | Facility: CLINIC | Age: 73
End: 2018-05-21
Payer: COMMERCIAL

## 2018-05-21 ENCOUNTER — TELEPHONE (OUTPATIENT)
Dept: UROGYNECOLOGY | Facility: CLINIC | Age: 73
End: 2018-05-21

## 2018-05-21 VITALS
DIASTOLIC BLOOD PRESSURE: 62 MMHG | SYSTOLIC BLOOD PRESSURE: 90 MMHG | HEIGHT: 64 IN | BODY MASS INDEX: 26.49 KG/M2 | WEIGHT: 155.19 LBS

## 2018-05-21 DIAGNOSIS — Z29.9 PROPHYLACTIC MEASURE: ICD-10-CM

## 2018-05-21 DIAGNOSIS — T81.31XA OPEN ABDOMINAL INCISION WITH DRAINAGE, INITIAL ENCOUNTER: ICD-10-CM

## 2018-05-21 DIAGNOSIS — Z98.890 POST-OPERATIVE STATE: Primary | ICD-10-CM

## 2018-05-21 DIAGNOSIS — N95.2 VAGINAL ATROPHY: ICD-10-CM

## 2018-05-21 LAB
ANION GAP SERPL CALC-SCNC: 5 MMOL/L
BASOPHILS # BLD AUTO: 0.04 K/UL
BASOPHILS NFR BLD: 0.5 %
BUN SERPL-MCNC: 21 MG/DL
CALCIUM SERPL-MCNC: 8.6 MG/DL
CHLORIDE SERPL-SCNC: 106 MMOL/L
CO2 SERPL-SCNC: 24 MMOL/L
CREAT SERPL-MCNC: 0.9 MG/DL
DIFFERENTIAL METHOD: NORMAL
EOSINOPHIL # BLD AUTO: 0.1 K/UL
EOSINOPHIL NFR BLD: 1.5 %
ERYTHROCYTE [DISTWIDTH] IN BLOOD BY AUTOMATED COUNT: 13.3 %
EST. GFR  (AFRICAN AMERICAN): >60 ML/MIN/1.73 M^2
EST. GFR  (NON AFRICAN AMERICAN): >60 ML/MIN/1.73 M^2
GLUCOSE SERPL-MCNC: 83 MG/DL
HCT VFR BLD AUTO: 42.1 %
HGB BLD-MCNC: 13.8 G/DL
LYMPHOCYTES # BLD AUTO: 2.1 K/UL
LYMPHOCYTES NFR BLD: 27.1 %
MCH RBC QN AUTO: 30.9 PG
MCHC RBC AUTO-ENTMCNC: 32.8 G/DL
MCV RBC AUTO: 94 FL
MONOCYTES # BLD AUTO: 0.7 K/UL
MONOCYTES NFR BLD: 9.4 %
NEUTROPHILS # BLD AUTO: 4.6 K/UL
NEUTROPHILS NFR BLD: 61.1 %
PLATELET # BLD AUTO: 247 K/UL
PMV BLD AUTO: 11.8 FL
POTASSIUM SERPL-SCNC: 4.3 MMOL/L
RBC # BLD AUTO: 4.47 M/UL
SODIUM SERPL-SCNC: 135 MMOL/L
WBC # BLD AUTO: 7.57 K/UL

## 2018-05-21 PROCEDURE — 80048 BASIC METABOLIC PNL TOTAL CA: CPT

## 2018-05-21 PROCEDURE — 36415 COLL VENOUS BLD VENIPUNCTURE: CPT

## 2018-05-21 PROCEDURE — 85025 COMPLETE CBC W/AUTO DIFF WBC: CPT

## 2018-05-21 PROCEDURE — 99999 PR PBB SHADOW E&M-EST. PATIENT-LVL III: CPT | Mod: PBBFAC,,, | Performed by: NURSE PRACTITIONER

## 2018-05-21 PROCEDURE — 99024 POSTOP FOLLOW-UP VISIT: CPT | Mod: S$GLB,,, | Performed by: NURSE PRACTITIONER

## 2018-05-21 NOTE — PROGRESS NOTES
Urogyn follow up  05/28/2018    [facility]  [address] [address]    Sury Gao  [id number]  1945      Sury Gao is a 72 y.o. here for a urogyn follow up.    04/09/2018  PROCEDURE DATE:  04/09/2018     1. Total Laparoscopic Hysterectomy  2. Bilateral salpingectomy-oophorectomy  3. Laparoscopic sacrocolpopexy per Dr. Castellanos  4. Cystoscopy per Dr. Castellanos    Past Medical History:   Diagnosis Date    Hypothyroidism     Nuclear sclerosis 4/23/2014    Thyroid disease     hypothyroid       Past Surgical History:   Procedure Laterality Date    Bilateral lower extremity varicose vein treatment       COLONOSCOPY N/A 2/23/2018    DILATION AND CURETTAGE OF UTERUS      HYSTERECTOMY  04/09/2018    prolapse    gutierrez neuroma      left foot    MINI-LAPAROTOMY      D&C perforation    OOPHORECTOMY  04/09/2018    TONSILLECTOMY      WRIST SURGERY      left wrist: Swave-Kapunjay surgery     History since last visit: had suture come out of umbilical incision on Friday 05/18/2018.  Began draining serosanguinous fluid.  Was cleaning with hydrodrogen peroxide and water.  Developed redness around umbilicus and was warm to touch.  Low grade temp.  Went to urgent care.  Was started on bactrim.  Redness has resolved;  to touch over incision. Afebrile at this time.   Bladder issues: Denies UI, urinary urgency, or urinary frequency. Voiding every 2-3 hours.  Nocturia 1/ night.  Bowel issues: Was initially constipated; still improving.  Taking colace daily.      Medications:    Current Outpatient Prescriptions:     docusate sodium (COLACE) 100 MG capsule, Take 100 mg by mouth 2 (two) times daily., Disp: , Rfl: 2    fexofenadine (ALLEGRA) 30 MG tablet, Take 30 mg by mouth continuous prn., Disp: , Rfl:     HYDROcodone-ibuprofen (VICOPROFEN) 5-200 mg per tablet, Take 1 tablet by mouth every 8 (eight) hours as needed for Pain., Disp: , Rfl:  "    ibuprofen (ADVIL,MOTRIN) 800 MG tablet, Take 1 tablet (800 mg total) by mouth every 6 (six) hours as needed for Pain. (Patient taking differently: Take 400 mg by mouth every 6 (six) hours as needed for Pain. ), Disp: 30 tablet, Rfl: 1    loratadine (CLARITIN) 10 mg tablet, Take 10 mg by mouth daily as needed., Disp: , Rfl:     polyethylene glycol (GLYCOLAX) 17 gram PwPk, Take 17 g by mouth once daily., Disp: 60 packet, Rfl: 2    SYNTHROID 75 mcg tablet, TAKE 1 TABLET BY MOUTH DAILY, Disp: 90 tablet, Rfl: 3    conjugated estrogens (PREMARIN) vaginal cream, Place 0.5 g vaginally once daily., Disp: 30 g, Rfl: 4    sulfamethoxazole-trimethoprim 800-160mg (BACTRIM DS) 800-160 mg Tab, Take 1 tablet by mouth 2 (two) times daily., Disp: , Rfl: 0      ROS:  As per HPI.      Exam  BP 90/62   Ht 5' 4" (1.626 m)   Wt 70.4 kg (155 lb 3.3 oz)   BMI 26.64 kg/m²   General: alert and oriented, no acute distress  Respiratory: normal respiratory effort  Abd: soft, non-tender, non-distended    Pelvic  Ext. Genitalia: normal external genitalia. Normal bartholin's and skeens glands  Vagina: + atrophy. Normal vaginal mucosa without lesions. No discharge noted.   Incisions healing well.  Few sutures still intact. Non-tender bladder base without palpable mass.  Cervix: absent  Uterus:  absent   Urethra: no masses or tenderness  Urethral meatus: no lesions, caruncle or prolapse.  Rectal: normal, no masses.      Dr. Castellanos present during exam    Impression  1. Post-operative state     2. Open abdominal incision with drainage, initial encounter  CT Abdomen Pelvis With Contrast    CBC auto differential   3. Vaginal atrophy     4. Prophylactic measure  Basic metabolic panel     We reviewed the above issues and discussed options for short-term versus long-term management of her problems.   Plan:   1. Post op healing well.  Continue post op instruction  2. CT scan of abdomen  3. Start vaginal estrogen cream 0.5 g daily  4. Start " miralax 17 Gm  Every 3 days  5. She will follow up with us in 1 week  30 minutes were spent in face to face time with this patient  90 % of this time was spent in counseling and/or coordination of care     Sierra Dozier NP  [facility]  Division of Female Pelvic Medicine and Reconstructive Surgery  Department of Obstetrics & Gynecology

## 2018-05-21 NOTE — TELEPHONE ENCOUNTER
"Called patient to follow up concerns with incision-patient stated she saw Dr Cordova today for an evaluation and will be getting CT Scan on 05/23/2018. She denies any discharge from site , but is still having soreness. I offered her an appointment today-she decline."states I will keep my appointment Wednesday. "Philippeon is a better"  "

## 2018-05-21 NOTE — PATIENT INSTRUCTIONS
1. Post op healing well.  Continue post op instruction  2. CT scan of abdomen  3. Start vaginal estrogen cream 0.5 g daily  4. Start miralax 17 Gm  Every 3 days  5. She will follow up with us in 1 week

## 2018-05-23 ENCOUNTER — TELEPHONE (OUTPATIENT)
Dept: UROGYNECOLOGY | Facility: CLINIC | Age: 73
End: 2018-05-23

## 2018-05-23 ENCOUNTER — HOSPITAL ENCOUNTER (OUTPATIENT)
Dept: RADIOLOGY | Facility: OTHER | Age: 73
Discharge: HOME | End: 2018-05-23
Attending: NURSE PRACTITIONER
Payer: COMMERCIAL

## 2018-05-23 ENCOUNTER — OFFICE VISIT (OUTPATIENT)
Dept: OBSTETRICS AND GYNECOLOGY | Facility: CLINIC | Age: 73
End: 2018-05-23
Attending: OBSTETRICS & GYNECOLOGY
Payer: COMMERCIAL

## 2018-05-23 VITALS
BODY MASS INDEX: 26.61 KG/M2 | SYSTOLIC BLOOD PRESSURE: 110 MMHG | DIASTOLIC BLOOD PRESSURE: 68 MMHG | HEIGHT: 64 IN | WEIGHT: 155.88 LBS

## 2018-05-23 DIAGNOSIS — Z09 POSTOP CHECK: Primary | ICD-10-CM

## 2018-05-23 DIAGNOSIS — T81.31XA OPEN ABDOMINAL INCISION WITH DRAINAGE, INITIAL ENCOUNTER: ICD-10-CM

## 2018-05-23 PROBLEM — Z12.11 SCREENING FOR COLON CANCER: Status: RESOLVED | Noted: 2018-02-23 | Resolved: 2018-05-23

## 2018-05-23 PROCEDURE — 25500020 PHARM REV CODE 255: Performed by: NURSE PRACTITIONER

## 2018-05-23 PROCEDURE — 99024 POSTOP FOLLOW-UP VISIT: CPT | Mod: S$GLB,,, | Performed by: OBSTETRICS & GYNECOLOGY

## 2018-05-23 PROCEDURE — 99999 PR PBB SHADOW E&M-EST. PATIENT-LVL III: CPT | Mod: PBBFAC,,, | Performed by: OBSTETRICS & GYNECOLOGY

## 2018-05-23 PROCEDURE — 74177 CT ABD & PELVIS W/CONTRAST: CPT | Mod: TC

## 2018-05-23 PROCEDURE — 74177 CT ABD & PELVIS W/CONTRAST: CPT | Mod: 26,,, | Performed by: RADIOLOGY

## 2018-05-23 RX ORDER — SULFAMETHOXAZOLE AND TRIMETHOPRIM 800; 160 MG/1; MG/1
1 TABLET ORAL 2 TIMES DAILY
Refills: 0 | COMMUNITY
Start: 2018-05-19 | End: 2018-05-31 | Stop reason: ALTCHOICE

## 2018-05-23 RX ADMIN — IOHEXOL 75 ML: 350 INJECTION, SOLUTION INTRAVENOUS at 09:05

## 2018-05-23 RX ADMIN — IOHEXOL 30 ML: 350 INJECTION, SOLUTION INTRAVENOUS at 09:05

## 2018-05-24 NOTE — PROGRESS NOTES
SUBJECTIVE:   72 y.o. female   for post op check. No LMP recorded. Patient has had a hysterectomy..  Had TLH/ BSO/ cuff suspension 18.  Path- neg.  She reports umbilical incision drainage last .  Started Bactrim.  Seen  and had exam.  CT scan ordered.  Done today.         Past Medical History:   Diagnosis Date    Hypothyroidism     Nuclear sclerosis 2014    Thyroid disease     hypothyroid     Past Surgical History:   Procedure Laterality Date    Bilateral lower extremity varicose vein treatment       COLONOSCOPY N/A 2018    DILATION AND CURETTAGE OF UTERUS      HYSTERECTOMY  2018    prolapse    gutierrez neuroma      left foot    MINI-LAPAROTOMY      D&C perforation    OOPHORECTOMY  2018    TONSILLECTOMY      WRIST SURGERY      left wrist: Swave-Kapunjay surgery     Social History     Social History    Marital status:      Spouse name: N/A    Number of children: N/A    Years of education: N/A     Occupational History    Not on file.     Social History Main Topics    Smoking status: Former Smoker    Smokeless tobacco: Never Used    Alcohol use Yes      Comment: social    Drug use: No    Sexual activity: Not Currently     Other Topics Concern    Not on file     Social History Narrative    No narrative on file     Family History   Problem Relation Age of Onset    Hypothyroidism Mother     Cataracts Mother     Heart disease Mother         Valvular heart disease    Cataracts Father     Macular degeneration Father     Hypothyroidism Sister     Hypothyroidism Daughter     Cancer Brother         testicular cancer    Amblyopia Neg Hx     Blindness Neg Hx     Glaucoma Neg Hx     Retinal detachment Neg Hx     Strabismus Neg Hx     Breast cancer Neg Hx     Colon cancer Neg Hx     Diabetes Neg Hx     Hypertension Neg Hx     Ovarian cancer Neg Hx     Cervical cancer Neg Hx     Endometrial cancer Neg Hx     Vaginal cancer Neg Hx       OB History    Para Term  AB Living   5 4 4   1 3   SAB TAB Ectopic Multiple Live Births   1       3      # Outcome Date GA Lbr Bertin/2nd Weight Sex Delivery Anes PTL Lv   5 Term            4 SAB            3 Term            2 Term            1 Term                     Current Outpatient Prescriptions   Medication Sig Dispense Refill    conjugated estrogens (PREMARIN) vaginal cream Place 0.5 g vaginally once daily. 30 g 4    docusate sodium (COLACE) 100 MG capsule Take 100 mg by mouth 2 (two) times daily.  2    fexofenadine (ALLEGRA) 30 MG tablet Take 30 mg by mouth continuous prn.      HYDROcodone-ibuprofen (VICOPROFEN) 5-200 mg per tablet Take 1 tablet by mouth every 8 (eight) hours as needed for Pain.      ibuprofen (ADVIL,MOTRIN) 800 MG tablet Take 1 tablet (800 mg total) by mouth every 6 (six) hours as needed for Pain. (Patient taking differently: Take 400 mg by mouth every 6 (six) hours as needed for Pain. ) 30 tablet 1    loratadine (CLARITIN) 10 mg tablet Take 10 mg by mouth daily as needed.      polyethylene glycol (GLYCOLAX) 17 gram PwPk Take 17 g by mouth once daily. 60 packet 2    sulfamethoxazole-trimethoprim 800-160mg (BACTRIM DS) 800-160 mg Tab Take 1 tablet by mouth 2 (two) times daily.  0    SYNTHROID 75 mcg tablet TAKE 1 TABLET BY MOUTH DAILY 90 tablet 3     No current facility-administered medications for this visit.      Allergies: Patient has no known allergies.     ROS:  Constitutional: no weight loss, weight gain, fever, fatigue  Eyes:  No vision changes, glasses/contacts  ENT/Mouth: No ulcers, sinus problems, ears ringing, headache  Cardiovascular: No inability to lie flat, chest pain, exercise intolerance, swelling, heart palpitations  Respiratory: No wheezing, coughing blood, shortness of breath, or cough  Gastrointestinal: No diarrhea, bloody stool, nausea/vomiting, constipation, gas, hemorrhoids  Genitourinary: No blood in urine, painful urination, urgency of  "urination, frequency of urination, incomplete emptying, incontinence, abnormal bleeding, painful periods, heavy periods, vaginal discharge, vaginal odor, painful intercourse, sexual problems, bleeding after intercourse.  Musculoskeletal: No muscle weakness  Skin/Breast: No painful breasts, nipple discharge, masses, rash, ulcers  Neurological: No passing out, seizures, numbness, headache  Endocrine: No diabetes, hypothyroid, hyperthyroid, hot flashes, hair loss, abnormal hair growth, ance  Psychiatric: No depression, crying  Hematologic: No bruises, bleeding, swollen lymph nodes, anemia.      OBJECTIVE:   The patient appears well, alert, oriented x 3, in no distress.  /68 (BP Location: Right arm, Patient Position: Sitting, BP Method: Medium (Manual))   Ht 5' 4" (1.626 m)   Wt 70.7 kg (155 lb 13.8 oz)   BMI 26.75 kg/m²   ABDOMEN: no hernias, masses, or hepatosplenomegaly.  Healed incisions.  No drainage  GENITALIA: Deferred per patient      ASSESSMENT:   1. Postop check         PLAN:       Discussed surgery, path, healing etc.  Discussed normal exam.  Healed incisions.  No signs of abscess  CT scan shows no obvious abscess.  Continue Bactrim  Wound precautions    Return to clinic in 2 weeks  "

## 2018-05-30 ENCOUNTER — TELEPHONE (OUTPATIENT)
Dept: UROGYNECOLOGY | Facility: CLINIC | Age: 73
End: 2018-05-30

## 2018-05-30 NOTE — TELEPHONE ENCOUNTER
Spoke with pt and scheduled her for today's visit at 2:30 pm. Pt voiced understanding and call ended.

## 2018-05-30 NOTE — TELEPHONE ENCOUNTER
----- Message from Itzel Stevenson sent at 5/30/2018  9:56 AM CDT -----  Contact: pt            Name of Who is Calling: pt      What is the request in detail: pt states she has completed the RX and is still experiencing the symptoms. Pt is needing to speak with staff      Can the clinic reply by [facility]: no      What Number to Call Back if not in [facility]: [phone]

## 2018-05-31 ENCOUNTER — OFFICE VISIT (OUTPATIENT)
Dept: UROGYNECOLOGY | Facility: CLINIC | Age: 73
End: 2018-05-31
Payer: COMMERCIAL

## 2018-05-31 VITALS
WEIGHT: 157.44 LBS | BODY MASS INDEX: 26.88 KG/M2 | SYSTOLIC BLOOD PRESSURE: 98 MMHG | HEIGHT: 64 IN | DIASTOLIC BLOOD PRESSURE: 60 MMHG

## 2018-05-31 DIAGNOSIS — Z98.890 POST-OPERATIVE STATE: Primary | ICD-10-CM

## 2018-05-31 PROCEDURE — 99999 PR PBB SHADOW E&M-EST. PATIENT-LVL III: CPT | Mod: PBBFAC,,, | Performed by: OBSTETRICS & GYNECOLOGY

## 2018-05-31 PROCEDURE — 99024 POSTOP FOLLOW-UP VISIT: CPT | Mod: S$GLB,,, | Performed by: OBSTETRICS & GYNECOLOGY

## 2018-05-31 NOTE — PROGRESS NOTES
Urogyn follow up  05/31/2018    [facility]  [address] [address]    Sury Gao  [id number]  1945      Sury Gao is a 72 y.o. here for a urogyn follow up.    04/09/2018  PROCEDURE DATE:  04/09/2018     1. Total Laparoscopic Hysterectomy  2. Bilateral salpingectomy-oophorectomy  3. Laparoscopic sacrocolpopexy with permanent mesh per Dr. Castellanos  4. Cystoscopy per Dr. Castellanos    Post operative week  5 umbical cellulitis treated with bactrim , Now doing well. Denies n/v/f/c. No drainage no pain.  Normal BM's     Past Medical History:   Diagnosis Date    Hypothyroidism     Nuclear sclerosis 4/23/2014    Thyroid disease     hypothyroid       Past Surgical History:   Procedure Laterality Date    Bilateral lower extremity varicose vein treatment       COLONOSCOPY N/A 2/23/2018    DILATION AND CURETTAGE OF UTERUS      HYSTERECTOMY  04/09/2018    prolapse    gutierrez neuroma      left foot    MINI-LAPAROTOMY      D&C perforation    OOPHORECTOMY  04/09/2018    TONSILLECTOMY      WRIST SURGERY      left wrist: Swave-Kapunjay surgery     History since last visit: had suture come out of umbilical incision on Friday 05/18/2018.  Began draining serosanguinous fluid.  Was cleaning with hydrodrogen peroxide and water.  Developed redness around umbilicus and was warm to touch.  Low grade temp.  Went to urgent care.  Was started on bactrim.  Redness has resolved;  to touch over incision. Afebrile at this time.   Bladder issues: Denies UI, urinary urgency, or urinary frequency. Voiding every 2-3 hours.  Nocturia 1/ night.  Bowel issues: Was initially constipated; still improving.  Taking colace daily.      Medications:    Current Outpatient Prescriptions:     conjugated estrogens (PREMARIN) vaginal cream, Place 0.5 g vaginally once daily., Disp: 30 g, Rfl: 4    docusate sodium (COLACE) 100 MG capsule, Take 100 mg by mouth 2 (two)  "times daily., Disp: , Rfl: 2    fexofenadine (ALLEGRA) 30 MG tablet, Take 30 mg by mouth continuous prn., Disp: , Rfl:     loratadine (CLARITIN) 10 mg tablet, Take 10 mg by mouth daily as needed., Disp: , Rfl:     polyethylene glycol (GLYCOLAX) 17 gram PwPk, Take 17 g by mouth once daily., Disp: 60 packet, Rfl: 2    SYNTHROID 75 mcg tablet, TAKE 1 TABLET BY MOUTH DAILY, Disp: 90 tablet, Rfl: 3      ROS:  As per HPI.      Exam  BP 98/60 (BP Location: Right arm, Patient Position: Sitting)   Ht 5' 4" (1.626 m)   Wt 71.4 kg (157 lb 6.5 oz)   BMI 27.02 kg/m²   General: alert and oriented, no acute distress  Respiratory: normal respiratory effort  Abd: soft, non-tender, non-distended    Pelvic  Ext. Genitalia: normal external genitalia. Normal bartholin's and skeens glands  Vagina: + atrophy. Normal vaginal mucosa without lesions. No discharge noted.   Incisions healing well.   Non-tender bladder base without palpable mass.  Cervix: absent  Uterus:  absent   Urethra: no masses or tenderness  Urethral meatus: no lesions, caruncle or prolapse.  Rectal: normal, no masses.      Impression  1. Post-operative state          We reviewed the above issues and discussed options for short-term versus long-term management of her problems.     Plan:   1. Post op healing well.  Continue post op instruction  2. Start vaginal estrogen cream 0.5 g twice a week   3. Continue miralax 17 Gm  every 3 days  4. Return to work 6/5/2018   5.   20 minutes were spent in face to face time with this patient  90 % of this time was spent in counseling and/or coordination of care     Maxx Castellanos DO [facility]  Division of Female Pelvic Medicine and Reconstructive Surgery  Department of Obstetrics & Gynecology  "

## 2018-05-31 NOTE — PATIENT INSTRUCTIONS
1. Post op healing well.  Continue post op instruction  2. Start vaginal estrogen cream 0.5 g twice a week   3. Continue miralax 17 Gm  every 3 days  4. Return to work 6/5/2018

## 2018-06-11 ENCOUNTER — TELEPHONE (OUTPATIENT)
Dept: INTERNAL MEDICINE | Facility: CLINIC | Age: 73
End: 2018-06-11

## 2018-06-11 NOTE — TELEPHONE ENCOUNTER
Spoke with pt re: medication has never been prescribed. Pt stated she is not on it but wanted the Dr to prescribe it.    I explained to the pt that she needed an office visit for him to prescribe that.  Made appt with pt for Wednesday 6-13-18 @ 7:40 AM.

## 2018-06-11 NOTE — TELEPHONE ENCOUNTER
Medication never prescribed for the patient.  If the patient is having swelling, she needs to be seen for further evaluation. Please inform the patient. Thank you.

## 2018-06-11 NOTE — TELEPHONE ENCOUNTER
----- Message from Jeffrey Eller sent at 6/11/2018 11:20 AM CDT -----  Contact: Patient [phone]  Requesting Fluid Retention Rx to be sent to pharmacy below [place]    Pharmacy: [id number] - [place] - [address] [facility] [phone] (Phone) [phone] (Fax)    Please call and advise  Thank you

## 2018-06-13 ENCOUNTER — PATIENT MESSAGE (OUTPATIENT)
Dept: INTERNAL MEDICINE | Facility: CLINIC | Age: 73
End: 2018-06-13

## 2018-06-13 ENCOUNTER — OFFICE VISIT (OUTPATIENT)
Dept: INTERNAL MEDICINE | Facility: CLINIC | Age: 73
End: 2018-06-13
Payer: COMMERCIAL

## 2018-06-13 ENCOUNTER — HOSPITAL ENCOUNTER (OUTPATIENT)
Dept: RADIOLOGY | Facility: HOSPITAL | Age: 73
Discharge: HOME | End: 2018-06-13
Attending: FAMILY MEDICINE
Payer: COMMERCIAL

## 2018-06-13 VITALS
BODY MASS INDEX: 26.75 KG/M2 | DIASTOLIC BLOOD PRESSURE: 70 MMHG | SYSTOLIC BLOOD PRESSURE: 102 MMHG | WEIGHT: 155.88 LBS | RESPIRATION RATE: 15 BRPM | HEART RATE: 71 BPM

## 2018-06-13 DIAGNOSIS — M25.552 LEFT HIP PAIN: ICD-10-CM

## 2018-06-13 DIAGNOSIS — E78.5 HYPERLIPIDEMIA, UNSPECIFIED HYPERLIPIDEMIA TYPE: ICD-10-CM

## 2018-06-13 DIAGNOSIS — M25.852 LEFT HIP IMPINGEMENT SYNDROME: Primary | ICD-10-CM

## 2018-06-13 DIAGNOSIS — R60.0 MILD PERIPHERAL EDEMA: Primary | ICD-10-CM

## 2018-06-13 PROCEDURE — 73502 X-RAY EXAM HIP UNI 2-3 VIEWS: CPT | Mod: TC,PO,LT

## 2018-06-13 PROCEDURE — 99214 OFFICE O/P EST MOD 30 MIN: CPT | Mod: S$GLB,,, | Performed by: FAMILY MEDICINE

## 2018-06-13 PROCEDURE — 99999 PR PBB SHADOW E&M-EST. PATIENT-LVL III: CPT | Mod: PBBFAC,,, | Performed by: FAMILY MEDICINE

## 2018-06-13 PROCEDURE — 73502 X-RAY EXAM HIP UNI 2-3 VIEWS: CPT | Mod: 26,LT,, | Performed by: RADIOLOGY

## 2018-06-13 RX ORDER — PREDNISONE 20 MG/1
40 TABLET ORAL DAILY
Qty: 10 TABLET | Refills: 0 | Status: SHIPPED | OUTPATIENT
Start: 2018-06-13 | End: 2018-06-18

## 2018-06-13 NOTE — PATIENT INSTRUCTIONS
Left elevation at rest.  Compression stocking with activity.  Hydration with at least 8 glasses of water daily.  Hip stretches.  OTC Red Yeast Rice daily for high cholesterol.  If you start having muscle cramps or fatigue, start OTC CoQ 10 daily.

## 2018-06-13 NOTE — TELEPHONE ENCOUNTER
Please schedule an orthopedic referral for further evaluation and treatment of left hip impingement.  Thank you.

## 2018-06-21 ENCOUNTER — OFFICE VISIT (OUTPATIENT)
Dept: ORTHOPEDICS | Facility: CLINIC | Age: 73
End: 2018-06-21
Payer: COMMERCIAL

## 2018-06-21 VITALS
BODY MASS INDEX: 26.46 KG/M2 | WEIGHT: 155 LBS | DIASTOLIC BLOOD PRESSURE: 61 MMHG | HEIGHT: 64 IN | SYSTOLIC BLOOD PRESSURE: 98 MMHG

## 2018-06-21 DIAGNOSIS — M16.12 PRIMARY OSTEOARTHRITIS OF LEFT HIP: Primary | ICD-10-CM

## 2018-06-21 PROCEDURE — 99999 PR PBB SHADOW E&M-EST. PATIENT-LVL III: CPT | Mod: PBBFAC,,, | Performed by: ORTHOPAEDIC SURGERY

## 2018-06-21 PROCEDURE — 99203 OFFICE O/P NEW LOW 30 MIN: CPT | Mod: S$GLB,,, | Performed by: ORTHOPAEDIC SURGERY

## 2018-06-21 RX ORDER — MELOXICAM 15 MG/1
15 TABLET ORAL DAILY
Qty: 30 TABLET | Refills: 1 | Status: SHIPPED | OUTPATIENT
Start: 2018-06-21 | End: 2018-07-13

## 2018-06-21 NOTE — PROGRESS NOTES
Subjective:      Patient ID: Sury Gao is a 72 y.o. female.    Chief Complaint: Pain of the Left Hip    HPI      Sury Gao is seen for evaluation and treatment of hip pain.  They have experienced problems with their left hip over the past many years Pain is located laterally and  referred to the knee.  The patient notes several years increasing symptoms, aggravated by exercise.  They have been treated with Physiotherapy.   Symptoms have recently worsened. Ambulation reportedly has not been impaired. Self care ADLs are not painful.     Review of Systems   Constitution: Negative for fever and weight loss.   HENT: Negative for congestion.    Eyes: Negative for visual disturbance.   Cardiovascular: Negative for chest pain.   Respiratory: Negative for shortness of breath.    Hematologic/Lymphatic: Negative for bleeding problem. Does not bruise/bleed easily.   Skin: Negative for poor wound healing.   Musculoskeletal: Positive for joint pain.   Gastrointestinal: Negative for abdominal pain.   Genitourinary: Negative for dysuria.   Neurological: Negative for seizures.   Psychiatric/Behavioral: Negative for altered mental status.   Allergic/Immunologic: Negative for persistent infections.         Objective:            Ortho/SPM Exam    Left Hip    Vitals:    06/21/18 0849   BP: 98/61        The patient is not in acute distress.   Body habitus is:normal.   The patient walks with a limp.   The skin over the hip is:intact.   There is:no local tenderness.  Range of motion- Flexion 85, External rotation 20, internal rotation 5.  Resisted SLR positive.  Pain with rotation positive  Sciatic tension findings negative.  Shortening/lengthening compared to the contralateral side exam deferred.  Pulses DP present, PT present.  Motor normal 5/5 strength in all tested muscle groups.   Sensory normal.    I reviewed the relevant radiographic images for the patient's condition:  Recent left hip films shows advanced  loss of joint space with small osteophytes        Assessment:       Encounter Diagnosis   Name Primary?    Primary osteoarthritis of left hip Yes          Objectively the condition is moderate to severe.  The patient is impairment is slightly less than I might expect, given the objective findings  Plan:       Sury was seen today for pain.    Diagnoses and all orders for this visit:    Primary osteoarthritis of left hip        I explained my diagnostic impression and the reasoning behind it in detail, using layman's terms.     Meloxicam    I explained to the patient that I am providing a prescription for anti-inflammatory medication.  I warned the patient that it should not be taken with other anti-inflammatory medications including over-the-counter products containing ibuprofen and naproxen.  I advised them to consult therapy primary physician or pharmacist if there is any question about this in the future.  I discussed the possible side effects including cardiovascular issues, renal issues and gastrointestinal problems.  I advised the patient to discontinue the medication should they develop persistent symptoms of dyspepsia.    I also explained that should they elect to continue this medication long-term, that is beyond the prescription that I provide at this time, they should contact their primary physician for refills and appropriate monitoring.    Use of a cane recommended    Patient advised to discontinue physical therapy    Home exercise regimen discussed      I explained the potential role of surgery in the treatment of this condition to the patient.  They understand that if nonsurgical measures do not adequately control symptoms, surgery will be considered in the future.      The patient plans to travel to [place] in September.  The initial treatment goal will be to have her comfortable for the trip.  Intra-articular injection will be considered after next evaluation

## 2018-07-10 ENCOUNTER — TELEPHONE (OUTPATIENT)
Dept: OBSTETRICS AND GYNECOLOGY | Facility: CLINIC | Age: 73
End: 2018-07-10

## 2018-07-10 NOTE — TELEPHONE ENCOUNTER
Patient is a post op TLH and Uterovaginal Prolapse-ahe  complains of fluid from the waist down and some fluid in her hands. I recommend she call for an appointment with her PMD and schedule a follow up in the GYN clinic. Patient feels she never had these symptoms until after TLH.

## 2018-07-10 NOTE — TELEPHONE ENCOUNTER
----- Message from Nahid Shelby sent at 7/10/2018  1:39 PM CDT -----  Please call pt no details [phone]

## 2018-07-11 ENCOUNTER — TELEPHONE (OUTPATIENT)
Dept: OBSTETRICS AND GYNECOLOGY | Facility: CLINIC | Age: 73
End: 2018-07-11

## 2018-07-11 NOTE — TELEPHONE ENCOUNTER
Called to get an symptom update on patient- she says I may be feeling a little emotional. She request  Friday 07/13/2018 appointment for further evaluation and treatment.

## 2018-07-13 ENCOUNTER — OFFICE VISIT (OUTPATIENT)
Dept: OBSTETRICS AND GYNECOLOGY | Facility: CLINIC | Age: 73
End: 2018-07-13
Attending: OBSTETRICS & GYNECOLOGY
Payer: COMMERCIAL

## 2018-07-13 VITALS
SYSTOLIC BLOOD PRESSURE: 100 MMHG | WEIGHT: 156.31 LBS | HEIGHT: 64 IN | BODY MASS INDEX: 26.69 KG/M2 | DIASTOLIC BLOOD PRESSURE: 60 MMHG

## 2018-07-13 DIAGNOSIS — N95.1 SYMPTOMS, SUCH AS FLUSHING, SLEEPLESSNESS, HEADACHE, LACK OF CONCENTRATION, ASSOCIATED WITH THE MENOPAUSE: Primary | ICD-10-CM

## 2018-07-13 DIAGNOSIS — R60.9 EDEMA, UNSPECIFIED TYPE: ICD-10-CM

## 2018-07-13 PROBLEM — Z90.710 S/P LAPAROSCOPIC HYSTERECTOMY: Status: RESOLVED | Noted: 2018-04-09 | Resolved: 2018-07-13

## 2018-07-13 PROCEDURE — 99213 OFFICE O/P EST LOW 20 MIN: CPT | Mod: S$GLB,,, | Performed by: OBSTETRICS & GYNECOLOGY

## 2018-07-13 PROCEDURE — 99999 PR PBB SHADOW E&M-EST. PATIENT-LVL III: CPT | Mod: PBBFAC,,, | Performed by: OBSTETRICS & GYNECOLOGY

## 2018-07-13 RX ORDER — BUTALBITAL, ACETAMINOPHEN AND CAFFEINE 50; 325; 40 MG/1; MG/1; MG/1
1 TABLET ORAL EVERY 4 HOURS PRN
Refills: 0 | COMMUNITY
Start: 2018-06-27 | End: 2019-03-15

## 2018-07-13 RX ORDER — CONJUGATED ESTROGENS 0.62 MG/G
CREAM VAGINAL
Refills: 4 | COMMUNITY
Start: 2018-07-02 | End: 2019-04-29

## 2018-07-13 RX ORDER — ESTRADIOL 0.5 MG/1
0.5 TABLET ORAL DAILY
Qty: 30 TABLET | Refills: 3 | Status: SHIPPED | OUTPATIENT
Start: 2018-07-13 | End: 2018-10-08 | Stop reason: ALTCHOICE

## 2018-07-17 NOTE — PROGRESS NOTES
SUBJECTIVE:   72 y.o. female   for mood issues after hysterectomy. No LMP recorded. Patient has had a hysterectomy..  Had TLH/BSO  for prolapse and sacrocolpoplexy.  Reports not feeling the same after surgery.  Has LE edema.  Has seen PCP without any resolution of this problem, or cause of it.  Also reports feeling a little emotional since surgery.  Hurt hip exercising, and is unable to do normal exercise at this time.  Feels frustrated about this- and a little depressed due to this.  Feels this injury has affected her mood to some degree.  Tried some SSRI years ago after death of her son.  She did not exactly like the way she felt while taking the SSRI.  Still works as a  and desires to have mental acuity to do what she needs to do at work.  Taking vaginal estrace twice weekly for bladder issues after surgery.         Past Medical History:   Diagnosis Date    Hypothyroidism     Nuclear sclerosis 2014    Thyroid disease     hypothyroid     Past Surgical History:   Procedure Laterality Date    Bilateral lower extremity varicose vein treatment       COLONOSCOPY N/A 2018    DILATION AND CURETTAGE OF UTERUS      HYSTERECTOMY  2018    prolapse    gutierrez neuroma      left foot    MINI-LAPAROTOMY      D&C perforation    OOPHORECTOMY  2018    TONSILLECTOMY      WRIST SURGERY      left wrist: Swave-Kapunjay surgery     Social History     Social History    Marital status:      Spouse name: N/A    Number of children: N/A    Years of education: N/A     Occupational History    Not on file.     Social History Main Topics    Smoking status: Former Smoker    Smokeless tobacco: Never Used    Alcohol use Yes      Comment: social    Drug use: No    Sexual activity: Not Currently     Other Topics Concern    Not on file     Social History Narrative    No narrative on file     Family History   Problem Relation Age of Onset    Hypothyroidism Mother     Cataracts  Mother     Heart disease Mother         Valvular heart disease    Cataracts Father     Macular degeneration Father     Hypothyroidism Sister     Hypothyroidism Daughter     Cancer Brother         testicular cancer    Amblyopia Neg Hx     Blindness Neg Hx     Glaucoma Neg Hx     Retinal detachment Neg Hx     Strabismus Neg Hx     Breast cancer Neg Hx     Colon cancer Neg Hx     Diabetes Neg Hx     Hypertension Neg Hx     Ovarian cancer Neg Hx     Cervical cancer Neg Hx     Endometrial cancer Neg Hx     Vaginal cancer Neg Hx      OB History    Para Term  AB Living   5 4 4   1 3   SAB TAB Ectopic Multiple Live Births   1       3      # Outcome Date GA Lbr Bertin/2nd Weight Sex Delivery Anes PTL Lv   5 Term            4 SAB            3 Term            2 Term            1 Term                     Current Outpatient Prescriptions   Medication Sig Dispense Refill    butalbital-acetaminophen-caffeine -40 mg (FIORICET, ESGIC) -40 mg per tablet Take 1 tablet by mouth every 4 (four) hours as needed. for pain.  0    docusate sodium (COLACE) 100 MG capsule Take 100 mg by mouth 2 (two) times daily.  2    fexofenadine (ALLEGRA) 30 MG tablet Take 30 mg by mouth continuous prn.      loratadine (CLARITIN) 10 mg tablet Take 10 mg by mouth daily as needed.      PREMARIN vaginal cream PLACE 0.5 G VAGINALLY ONCE DAILY.  4    SYNTHROID 75 mcg tablet TAKE 1 TABLET BY MOUTH DAILY 90 tablet 3    estradiol (ESTRACE) 0.5 MG tablet Take 1 tablet (0.5 mg total) by mouth once daily. 30 tablet 3     No current facility-administered medications for this visit.      Allergies: Patient has no known allergies.     ROS:  Constitutional: no weight loss, weight gain, fever, fatigue  Eyes:  No vision changes, glasses/contacts  ENT/Mouth: No ulcers, sinus problems, ears ringing, headache  Cardiovascular: No inability to lie flat, chest pain, exercise intolerance, swelling, heart palpitations  Respiratory:  "No wheezing, coughing blood, shortness of breath, or cough  Gastrointestinal: No diarrhea, bloody stool, nausea/vomiting, constipation, gas, hemorrhoids  Genitourinary: No blood in urine, painful urination, urgency of urination, frequency of urination, incomplete emptying, incontinence, abnormal bleeding, painful periods, heavy periods, vaginal discharge, vaginal odor, painful intercourse, sexual problems, bleeding after intercourse.  Musculoskeletal: No muscle weakness  Skin/Breast: No painful breasts, nipple discharge, masses, rash, ulcers  Neurological: No passing out, seizures, numbness, headache  Endocrine: No diabetes, hypothyroid, hyperthyroid, hot flashes, hair loss, abnormal hair growth, ance  Psychiatric: Some depression, some crying  Hematologic: No bruises, bleeding, swollen lymph nodes, anemia.      OBJECTIVE:   The patient appears well, alert, oriented x 3, in no distress.  /60 (BP Location: Left arm, Patient Position: Sitting, BP Method: Medium (Manual))   Ht 5' 4" (1.626 m)   Wt 70.9 kg (156 lb 4.9 oz)   BMI 26.83 kg/m²       ASSESSMENT:   1. Symptoms, such as flushing, sleeplessness, headache, lack of concentration, associated with the menopause     2. Edema, unspecified type         PLAN:   Orders Placed This Encounter    estradiol (ESTRACE) 0.5 MG tablet     Discussed mood issues.  Consider Brisdelle / Paxil.  Discussed oral estrace.  Discussed WHi and risks.  Desires trial of oral estrace .5 mg.  Return to clinic prn  "

## 2018-09-07 DIAGNOSIS — E03.9 HYPOTHYROIDISM, UNSPECIFIED TYPE: ICD-10-CM

## 2018-09-07 RX ORDER — LEVOTHYROXINE SODIUM 75 UG/1
TABLET ORAL
Qty: 90 TABLET | Refills: 3 | Status: SHIPPED | OUTPATIENT
Start: 2018-09-07 | End: 2019-09-18 | Stop reason: SDUPTHER

## 2018-09-26 ENCOUNTER — OFFICE VISIT (OUTPATIENT)
Dept: FAMILY MEDICINE | Facility: CLINIC | Age: 73
End: 2018-09-26
Payer: COMMERCIAL

## 2018-09-26 VITALS
BODY MASS INDEX: 26.16 KG/M2 | WEIGHT: 153.25 LBS | HEIGHT: 64 IN | TEMPERATURE: 99 F | SYSTOLIC BLOOD PRESSURE: 101 MMHG | DIASTOLIC BLOOD PRESSURE: 64 MMHG | HEART RATE: 62 BPM

## 2018-09-26 DIAGNOSIS — E03.9 HYPOTHYROIDISM, UNSPECIFIED TYPE: ICD-10-CM

## 2018-09-26 DIAGNOSIS — J30.89 ENVIRONMENTAL AND SEASONAL ALLERGIES: ICD-10-CM

## 2018-09-26 DIAGNOSIS — Z23 NEED FOR ZOSTER VACCINE: ICD-10-CM

## 2018-09-26 DIAGNOSIS — G44.009 CLUSTER HEADACHE, NOT INTRACTABLE, UNSPECIFIED CHRONICITY PATTERN: ICD-10-CM

## 2018-09-26 DIAGNOSIS — Z23 NEED FOR PROPHYLACTIC VACCINATION AND INOCULATION AGAINST INFLUENZA: ICD-10-CM

## 2018-09-26 DIAGNOSIS — Z00.00 ANNUAL PHYSICAL EXAM: Primary | ICD-10-CM

## 2018-09-26 DIAGNOSIS — Z78.0 POSTMENOPAUSAL: ICD-10-CM

## 2018-09-26 DIAGNOSIS — M16.12 OSTEOARTHRITIS OF LEFT HIP, UNSPECIFIED OSTEOARTHRITIS TYPE: ICD-10-CM

## 2018-09-26 DIAGNOSIS — E78.5 HYPERLIPIDEMIA, UNSPECIFIED HYPERLIPIDEMIA TYPE: ICD-10-CM

## 2018-09-26 DIAGNOSIS — Z76.89 ENCOUNTER TO ESTABLISH CARE: Primary | ICD-10-CM

## 2018-09-26 DIAGNOSIS — M79.89 LEG SWELLING: ICD-10-CM

## 2018-09-26 PROCEDURE — 99999 PR PBB SHADOW E&M-EST. PATIENT-LVL III: CPT | Mod: PBBFAC,,, | Performed by: INTERNAL MEDICINE

## 2018-09-26 PROCEDURE — 99214 OFFICE O/P EST MOD 30 MIN: CPT | Mod: 25,S$GLB,, | Performed by: INTERNAL MEDICINE

## 2018-09-26 PROCEDURE — 96372 THER/PROPH/DIAG INJ SC/IM: CPT | Mod: 59,S$GLB,, | Performed by: INTERNAL MEDICINE

## 2018-09-26 PROCEDURE — 90471 IMMUNIZATION ADMIN: CPT | Mod: S$GLB,,, | Performed by: INTERNAL MEDICINE

## 2018-09-26 PROCEDURE — 90750 HZV VACC RECOMBINANT IM: CPT | Mod: S$GLB,,, | Performed by: INTERNAL MEDICINE

## 2018-09-26 PROCEDURE — 90662 IIV NO PRSV INCREASED AG IM: CPT | Mod: S$GLB,,, | Performed by: INTERNAL MEDICINE

## 2018-09-26 PROCEDURE — 1101F PT FALLS ASSESS-DOCD LE1/YR: CPT | Mod: CPTII,S$GLB,, | Performed by: INTERNAL MEDICINE

## 2018-09-26 RX ORDER — FUROSEMIDE 40 MG/1
40 TABLET ORAL DAILY PRN
Qty: 30 TABLET | Refills: 2 | Status: SHIPPED | OUTPATIENT
Start: 2018-09-26 | End: 2018-10-22 | Stop reason: SDUPTHER

## 2018-09-26 NOTE — PROGRESS NOTES
Two patient identifiers verified.  Allergies reviewed. Shingrix  given  IM administered to left deltoid per MD order.  Patient tolerated injection well; no redness, bleeding, or bruising noted to injection site.  Patient instructed to remain in clinic setting for 15 minutes.  Verbalizes understanding.  Flu consent signed by patient. High dose Flu vaccine administered left deltoid.

## 2018-09-26 NOTE — PROGRESS NOTES
Subjective:        Patient ID: Sury Gao is a 73 y.o. female.    Chief Complaint: Establish Care (transfer pt)    HPI   Sury Gao presents to establish care.    1. Fluid retention: chronic; occurs around eyes, abdomen, legs/feet.  Pt is a  and stands at her bench all day.  She went to [place] for 3 weeks and took Diazide and Spironolactone while she was there; these really helped with the swelling.    2. Migraine, cluster HAs: Fioricet PRN; has improved after starting HRT.  She gets a migraine ~every 2 months.    Pt has had an episode of aphasia related to cluster HA in the past.    3. L hip OA: Chronic pain, limits exercise.  Currently pain is pretty well controlled w Ibuprofen 800mg qAM.  Aleve had worked well until it started causing stomach irritation.  Pt has seen 2 orthos and both recommended joint replacement.  No injections before.  Pt not sure if/when she would be ready for surgery.    Review of Systems  as per HPI      Objective:        Vitals:    09/26/18 1312   BP: 101/64   Pulse: 62   Temp: 98.5 °F (36.9 °C)     Physical Exam   Constitutional: She is oriented to person, place, and time. She appears well-developed and well-nourished. No distress.   HENT:   Head: Normocephalic and atraumatic.   Neck: Normal range of motion. Neck supple. No thyromegaly present.   Cardiovascular: Normal rate, regular rhythm and normal heart sounds.   No murmur heard.  Pulmonary/Chest: Effort normal and breath sounds normal. No respiratory distress.   Musculoskeletal: She exhibits no edema.   Lymphadenopathy:     She has no cervical adenopathy.   Neurological: She is alert and oriented to person, place, and time.   Skin: Skin is warm and dry.   Psychiatric: She has a normal mood and affect. Her behavior is normal.   Vitals reviewed.          Assessment:         1. Encounter to establish care    2. Need for zoster vaccine    3. Leg swelling    4. Hypothyroidism, unspecified type    5.  Osteoarthritis of left hip, unspecified osteoarthritis type    6. Chronic migraine    7. Cluster headache, not intractable, unspecified chronicity pattern    8. Hyperlipidemia, unspecified hyperlipidemia type    9. Postmenopausal    10. Environmental and seasonal allergies    11. Need for prophylactic vaccination and inoculation against influenza              Plan:         Sury was seen today for establish care.    Diagnoses and all orders for this visit:    Encounter to establish care  - Shingrix, flu today    Need for zoster vaccine  -     Zoster Recombinant Vaccine    Leg swelling: Lasix PRN; pt reports swelling ~2-3x/month  -     furosemide (LASIX) 40 MG tablet; Take 1 tablet (40 mg total) by mouth daily as needed (swelling).    Hypothyroidism, unspecified type: Synthroid 75mcg daily    Osteoarthritis of left hip, unspecified osteoarthritis type: Pt is considering surgery, will f/u with ortho.  - Discussed alternating Ibuprofen with Tylenol or starting daily OTC PPI for GI ppx    Chronic migraine  Cluster headache, not intractable, unspecified chronicity pattern: HAs improved with HRT; Fioricet PRN    Hyperlipidemia, unspecified hyperlipidemia type: ASCVD risk low 5%; reviewed guidelines for treatment with statins, not indicated at this time.  Will recheck fasting lipids with annual exam and labs.    Postmenopausal: Premarin vaginal cream 2x/week; follow up with gyn    Environmental and seasonal allergies: no acute issues    Need for prophylactic vaccination and inoculation against influenza  -     Flu Vaccine - High Dose (PF) (65+)        Follow-up for annual exam or sooner as needed.

## 2018-10-08 ENCOUNTER — OFFICE VISIT (OUTPATIENT)
Dept: OBSTETRICS AND GYNECOLOGY | Facility: CLINIC | Age: 73
End: 2018-10-08
Attending: OBSTETRICS & GYNECOLOGY
Payer: COMMERCIAL

## 2018-10-08 VITALS
HEIGHT: 65 IN | SYSTOLIC BLOOD PRESSURE: 110 MMHG | DIASTOLIC BLOOD PRESSURE: 60 MMHG | BODY MASS INDEX: 26.08 KG/M2 | WEIGHT: 156.5 LBS

## 2018-10-08 DIAGNOSIS — Z79.890 NEED FOR PROPHYLACTIC HORMONE REPLACEMENT THERAPY (POSTMENOPAUSAL): ICD-10-CM

## 2018-10-08 DIAGNOSIS — F52.0 HYPOACTIVE SEXUAL DESIRE DISORDER: Primary | ICD-10-CM

## 2018-10-08 PROCEDURE — 99213 OFFICE O/P EST LOW 20 MIN: CPT | Mod: S$GLB,,, | Performed by: OBSTETRICS & GYNECOLOGY

## 2018-10-08 PROCEDURE — 99999 PR PBB SHADOW E&M-EST. PATIENT-LVL III: CPT | Mod: PBBFAC,,, | Performed by: OBSTETRICS & GYNECOLOGY

## 2018-10-08 PROCEDURE — 1101F PT FALLS ASSESS-DOCD LE1/YR: CPT | Mod: CPTII,S$GLB,, | Performed by: OBSTETRICS & GYNECOLOGY

## 2018-10-08 RX ORDER — ESTERIFIED ESTROGEN AND METHYLTESTOSTERONE .625; 1.25 MG/1; MG/1
1 TABLET ORAL DAILY
Qty: 30 TABLET | Refills: 5 | Status: SHIPPED | OUTPATIENT
Start: 2018-10-08 | End: 2019-04-29 | Stop reason: SDUPTHER

## 2018-10-09 NOTE — PROGRESS NOTES
SUBJECTIVE:   73 y.o. female   for decreased sex drive. No LMP recorded. Patient has had a hysterectomy..  Patient had TLH/ BSO and sacrocolpopexy .  Complained of mood issues since surgery.  Declined SSRI.  Started oral estradiol .5 mg daily, which relieved sx.  Now reports low libido since surgery.         Past Medical History:   Diagnosis Date    Hypothyroidism     Nuclear sclerosis 2014    Thyroid disease     hypothyroid     Past Surgical History:   Procedure Laterality Date    Bilateral lower extremity varicose vein treatment       COLONOSCOPY N/A 2018    COLONOSCOPY N/A 2018    [facility]    COLPOPEXY, laparoscopic  N/A 2018    [facility]    CYSTOSCOPY N/A 2018    Per[facility] by [facility] at [facility] OR    DILATION AND CURETTAGE OF UTERUS      HYSTERECTOMY  2018    prolapse    HYSTERECTOMY-TOTAL LAPAROSCOPIC (TLH) N/A 2018    [facility] at [facility] OR    gutierrez neuroma      left foot    MINI-LAPAROTOMY      D&C perforation    OOPHORECTOMY  2018    [id number] Bilateral 2018    Per[facility] at [facility]    TONSILLECTOMY      WRIST SURGERY      left wrist: Swave-Kapunjay surgery     Social History     Socioeconomic History    Marital status:      Spouse name: Not on file    Number of children: Not on file    Years of education: Not on file    Highest education level: Not on file   Social Needs    Financial resource strain: Not on file    Food insecurity - worry: Not on file    Food insecurity - inability: Not on file    Transportation needs - medical: Not on file    Transportation needs - non-medical: Not on file   Occupational History    Not on file   Tobacco Use    Smoking status: Former Smoker    Smokeless tobacco: Never Used   Substance and Sexual Activity     Alcohol use: Yes     Comment: social    Drug use: No    Sexual activity: Not Currently   Other Topics Concern    Not on file   Social History Narrative        Boyfriend is physician    Exercise limited by hip pain from OA     Family History   Problem Relation Age of Onset    Hypothyroidism Mother     Cataracts Mother     Heart disease Mother         Valvular heart disease    Cataracts Father     Macular degeneration Father     Hypothyroidism Sister     Hypothyroidism Daughter     Cancer Brother         testicular cancer    Amblyopia Neg Hx     Blindness Neg Hx     Glaucoma Neg Hx     Retinal detachment Neg Hx     Strabismus Neg Hx     Breast cancer Neg Hx     Colon cancer Neg Hx     Diabetes Neg Hx     Hypertension Neg Hx     Ovarian cancer Neg Hx     Cervical cancer Neg Hx     Endometrial cancer Neg Hx     Vaginal cancer Neg Hx      OB History    Para Term  AB Living   5 4 4   1 3   SAB TAB Ectopic Multiple Live Births   1       3      # Outcome Date GA Lbr Bertin/2nd Weight Sex Delivery Anes PTL Lv   5 Term            4 SAB            3 Term            2 Term            1 Term                     Current Outpatient Medications   Medication Sig Dispense Refill    butalbital-acetaminophen-caffeine -40 mg (FIORICET, ESGIC) -40 mg per tablet Take 1 tablet by mouth every 4 (four) hours as needed. for pain.  0    fexofenadine (ALLEGRA) 30 MG tablet Take 30 mg by mouth continuous prn.      furosemide (LASIX) 40 MG tablet Take 1 tablet (40 mg total) by mouth daily as needed (swelling). 30 tablet 2    loratadine (CLARITIN) 10 mg tablet Take 10 mg by mouth daily as needed.      PREMARIN vaginal cream PLACE 0.5 G VAGINALLY ONCE DAILY.  4    SYNTHROID 75 mcg tablet TAKE 1 TABLET BY MOUTH DAILY 90 tablet 3    estrogens,conjugated,-methyltestosterone 0.625-1.25mg (ESTRATEST HS) 0.625-1.25 mg per tablet Take 1 tablet by mouth once daily. 30 tablet 5     No current  "facility-administered medications for this visit.      Allergies: Patient has no known allergies.     ROS:  Constitutional: no weight loss, weight gain, fever, fatigue  Eyes:  No vision changes, glasses/contacts  ENT/Mouth: No ulcers, sinus problems, ears ringing, headache  Cardiovascular: No inability to lie flat, chest pain, exercise intolerance, swelling, heart palpitations  Respiratory: No wheezing, coughing blood, shortness of breath, or cough  Gastrointestinal: No diarrhea, bloody stool, nausea/vomiting, constipation, gas, hemorrhoids  Genitourinary: No blood in urine, painful urination, urgency of urination, frequency of urination, incomplete emptying, incontinence, abnormal bleeding, painful periods, heavy periods, vaginal discharge, vaginal odor, painful intercourse, +sexual problems, bleeding after intercourse.  Musculoskeletal: No muscle weakness  Skin/Breast: No painful breasts, nipple discharge, masses, rash, ulcers  Neurological: No passing out, seizures, numbness, headache  Endocrine: No diabetes, hypothyroid, hyperthyroid, hot flashes, hair loss, abnormal hair growth, ance  Psychiatric: No depression, crying  Hematologic: No bruises, bleeding, swollen lymph nodes, anemia.      OBJECTIVE:   The patient appears well, alert, oriented x 3, in no distress.  /60 (BP Location: Right arm, Patient Position: Sitting, BP Method: Medium (Manual))   Ht 5' 5" (1.651 m)   Wt 71 kg (156 lb 8.4 oz)   BMI 26.05 kg/m²       ASSESSMENT:   1. Hypoactive sexual desire disorder     2. Need for prophylactic hormone replacement therapy (postmenopausal)  Estradiol       PLAN:   Orders Placed This Encounter    Estradiol    estrogens,conjugated,-methyltestosterone 0.625-1.25mg (ESTRATEST HS) 0.625-1.25 mg per tablet     Discussed los libido, ERT, WHI.  Desires testosterone.  Trial of Estratest.  Aware of risks.  Consult Dr. Mattson for hormone consult and "menopause" issues.  Return to clinic prn  "

## 2018-10-22 ENCOUNTER — TELEPHONE (OUTPATIENT)
Dept: OBSTETRICS AND GYNECOLOGY | Facility: CLINIC | Age: 73
End: 2018-10-22

## 2018-10-22 DIAGNOSIS — M79.89 LEG SWELLING: ICD-10-CM

## 2018-10-22 RX ORDER — FUROSEMIDE 40 MG/1
40 TABLET ORAL DAILY PRN
Qty: 90 TABLET | Refills: 2 | Status: SHIPPED | OUTPATIENT
Start: 2018-10-22 | End: 2024-02-05

## 2018-10-22 NOTE — TELEPHONE ENCOUNTER
Receive request authorization for #90 day supply X 1 refill Estradiol 0.5mg tablet from [id number]. Confirm with pharmacist Yojana

## 2018-10-23 ENCOUNTER — PATIENT MESSAGE (OUTPATIENT)
Dept: FAMILY MEDICINE | Facility: CLINIC | Age: 73
End: 2018-10-23

## 2018-11-26 ENCOUNTER — CLINICAL SUPPORT (OUTPATIENT)
Dept: FAMILY MEDICINE | Facility: CLINIC | Age: 73
End: 2018-11-26
Payer: COMMERCIAL

## 2018-11-26 DIAGNOSIS — Z23 NEED FOR ZOSTER VACCINE: ICD-10-CM

## 2018-11-26 DIAGNOSIS — Z23 NEED FOR PROPHYLACTIC VACCINATION AND INOCULATION AGAINST INFLUENZA: Primary | ICD-10-CM

## 2018-11-26 PROCEDURE — 90750 HZV VACC RECOMBINANT IM: CPT | Mod: S$GLB,,, | Performed by: INTERNAL MEDICINE

## 2018-11-26 PROCEDURE — 99999 PR PBB SHADOW E&M-EST. PATIENT-LVL I: CPT | Mod: PBBFAC,,,

## 2018-11-26 PROCEDURE — 90471 IMMUNIZATION ADMIN: CPT | Mod: S$GLB,,, | Performed by: INTERNAL MEDICINE

## 2018-11-26 NOTE — PROGRESS NOTES
Two patient identifiers verified.  Allergies reviewed.Shingrix #2   IM administered to Left deltoid per MD order.  Patient tolerated injection well; no redness, bleeding, or bruising noted to injection site.  Patient instructed to remain in clinic setting for 15 minutes.  Verbalizes understanding.

## 2018-12-07 ENCOUNTER — OFFICE VISIT (OUTPATIENT)
Dept: UROGYNECOLOGY | Facility: CLINIC | Age: 73
End: 2018-12-07
Payer: COMMERCIAL

## 2018-12-07 VITALS
SYSTOLIC BLOOD PRESSURE: 124 MMHG | DIASTOLIC BLOOD PRESSURE: 82 MMHG | BODY MASS INDEX: 26.26 KG/M2 | HEIGHT: 65 IN | WEIGHT: 157.63 LBS

## 2018-12-07 DIAGNOSIS — N95.2 ATROPHIC VAGINITIS: Primary | ICD-10-CM

## 2018-12-07 PROCEDURE — 99999 PR PBB SHADOW E&M-EST. PATIENT-LVL III: CPT | Mod: PBBFAC,,, | Performed by: OBSTETRICS & GYNECOLOGY

## 2018-12-07 PROCEDURE — 1101F PT FALLS ASSESS-DOCD LE1/YR: CPT | Mod: CPTII,S$GLB,, | Performed by: OBSTETRICS & GYNECOLOGY

## 2018-12-07 PROCEDURE — 99213 OFFICE O/P EST LOW 20 MIN: CPT | Mod: S$GLB,,, | Performed by: OBSTETRICS & GYNECOLOGY

## 2018-12-07 NOTE — PROGRESS NOTES
Urogyn follow up  12/31/2018    [facility]  [address] [address]    Sury Gao  [id number]  1945      Sury Gao is a 73 y.o. here for a urogyn follow up.    04/09/2018  PROCEDURE DATE:  04/09/2018     1. Total Laparoscopic Hysterectomy  2. Bilateral salpingectomy-oophorectomy  3. Laparoscopic sacrocolpopexy with permanent mesh   4. Cystoscopy per Dr. Castellanos    Post operative week  5 umbical cellulitis treated with bactrim , Now doing well. Denies n/v/f/c. No drainage no pain.  Normal BM's     Past Medical History:   Diagnosis Date    Hypothyroidism     Nuclear sclerosis 4/23/2014    Thyroid disease     hypothyroid       Past Surgical History:   Procedure Laterality Date    Bilateral lower extremity varicose vein treatment       COLONOSCOPY N/A 2/23/2018    Performed by Emiliano Israel MD at [facility] (4TH FLR)    COLPOPEXY, laparoscopic  N/A 4/9/2018    Performed by Maxx [facility]ellanos DO at [facility] OR    CYSTOSCOPY N/A 4/9/2018    Performed by [facility]x [facility]anos DO at [facility] OR    DILATION AND CURETTAGE OF UTERUS      HYSTERECTOMY  04/09/2018    prolapse    HYSTERECTOMY-TOTAL LAPAROSCOPIC (TLH) N/A 4/9/2018    Performed by Maxx Castellanos DO at [facility] OR    matt neuroma      left foot    MINI-LAPAROTOMY      D&C perforation    OOPHORECTOMY  04/09/2018    [id number] Bilateral 4/9/2018    Performed by [facility]x Castellanos DO at [facility] OR    TONSILLECTOMY      WRIST SURGERY      left wrist: Swave-Kapunjay surgery     History since last visit: had suture come out of umbilical incision on Friday 05/18/2018.  Began draining serosanguinous fluid.  Was cleaning with hydrodrogen peroxide and water.  Developed redness around umbilicus and was warm to touch.  Low grade temp.  Went to urgent care.  Was started on bactrim.  Redness has resolved;  to touch over incision. Afebrile at this  "time.   Bladder issues: Denies UI, urinary urgency, or urinary frequency. Voiding every 2-3 hours.  Nocturia 1/ night.  Bowel issues: Was initially constipated; still improving.  Taking colace daily.      Medications:    Current Outpatient Medications:     butalbital-acetaminophen-caffeine -40 mg (FIORICET, ESGIC) -40 mg per tablet, Take 1 tablet by mouth every 4 (four) hours as needed. for pain., Disp: , Rfl: 0    estrogens,conjugated,-methyltestosterone 0.625-1.25mg (ESTRATEST HS) 0.625-1.25 mg per tablet, Take 1 tablet by mouth once daily., Disp: 30 tablet, Rfl: 5    fexofenadine (ALLEGRA) 30 MG tablet, Take 30 mg by mouth continuous prn., Disp: , Rfl:     furosemide (LASIX) 40 MG tablet, Take 1 tablet (40 mg total) by mouth daily as needed (swelling)., Disp: 90 tablet, Rfl: 2    loratadine (CLARITIN) 10 mg tablet, Take 10 mg by mouth daily as needed., Disp: , Rfl:     PREMARIN vaginal cream, PLACE 0.5 G VAGINALLY ONCE DAILY., Disp: , Rfl: 4    SYNTHROID 75 mcg tablet, TAKE 1 TABLET BY MOUTH DAILY, Disp: 90 tablet, Rfl: 3      ROS:  As per HPI.      Exam  /82 (BP Location: Right arm, Patient Position: Sitting)   Ht 5' 5" (1.651 m)   Wt 71.5 kg (157 lb 10.1 oz)   BMI 26.23 kg/m²   General: alert and oriented, no acute distress  Respiratory: normal respiratory effort  Abd: soft, non-tender, non-distended    Pelvic  Ext. Genitalia: normal external genitalia. Normal bartholin's and skeens glands  Vagina: + atrophy. Normal vaginal mucosa without lesions. No discharge noted.   Incisions healing well.   Non-tender bladder base without palpable mass.  Cervix: absent  Uterus:  absent   Urethra: no masses or tenderness, caruncle   Urethral meatus: no lesions, caruncle or prolapse.  Rectal: normal, no masses.      Impression  1. Atrophic vaginitis          We reviewed the above issues and discussed options for short-term versus long-term management of her problems.     Plan:   1. Urinary " incontinence likely related   2. Start vaginal estrogen cream 0.5 g twice a week         20 minutes were spent in face to face time with this patient  90 % of this time was spent in counseling and/or coordination of care     Maxx Castellanos DO [facility]  Division of Female Pelvic Medicine and Reconstructive Surgery  Department of Obstetrics & Gynecology

## 2018-12-17 ENCOUNTER — OFFICE VISIT (OUTPATIENT)
Dept: FAMILY MEDICINE | Facility: CLINIC | Age: 73
End: 2018-12-17
Payer: COMMERCIAL

## 2018-12-17 VITALS
BODY MASS INDEX: 26 KG/M2 | RESPIRATION RATE: 16 BRPM | DIASTOLIC BLOOD PRESSURE: 70 MMHG | TEMPERATURE: 98 F | WEIGHT: 156.06 LBS | SYSTOLIC BLOOD PRESSURE: 106 MMHG | HEIGHT: 65 IN

## 2018-12-17 DIAGNOSIS — Z01.818 PREOP EXAMINATION: Primary | ICD-10-CM

## 2018-12-17 PROCEDURE — 1101F PT FALLS ASSESS-DOCD LE1/YR: CPT | Mod: CPTII,S$GLB,, | Performed by: INTERNAL MEDICINE

## 2018-12-17 PROCEDURE — 99214 OFFICE O/P EST MOD 30 MIN: CPT | Mod: S$GLB,,, | Performed by: INTERNAL MEDICINE

## 2018-12-17 PROCEDURE — 99999 PR PBB SHADOW E&M-EST. PATIENT-LVL III: CPT | Mod: PBBFAC,,, | Performed by: INTERNAL MEDICINE

## 2018-12-17 NOTE — PROGRESS NOTES
Subjective:        Patient ID: Sury Gao is a 73 y.o. female.    Chief Complaint: Pre-operative evaluation for L hip replacement  HPI  Sury Gao presents for pre-op evaluation for L hip replacement on 1/9/19.    Pt has had surgeries in the past and tolerated anesthesia without adverse reactions.    Currently pt's activity is severely limited by her L hip pain.  She is still standing at work daily and walks 2796-2925 steps/day.  No sx with exertion.  Leg swelling is stable and pt takes Lasix ~once/week for this.    Review of Systems   Constitutional: Negative for activity change.   Respiratory: Negative for chest tightness and shortness of breath.         - no orthopnea   Cardiovascular: Negative for chest pain, palpitations and leg swelling.   Neurological: Negative for dizziness and light-headedness.   Hematological: Does not bruise/bleed easily.           Objective:        Vitals:    12/17/18 1129   BP: 106/70   Resp: 16   Temp: 97.6 °F (36.4 °C)     Physical Exam   Constitutional: She is oriented to person, place, and time. She appears well-developed and well-nourished. No distress.   HENT:   Head: Normocephalic and atraumatic.   Cardiovascular: Normal rate, regular rhythm, normal heart sounds and intact distal pulses.   No murmur heard.  Pulmonary/Chest: Effort normal and breath sounds normal. No respiratory distress.   Musculoskeletal: She exhibits no edema.   Neurological: She is alert and oriented to person, place, and time.   Skin: Skin is warm and dry.   Vitals reviewed.          Assessment:         1. Preop examination              Plan:         Sury was seen today for pre-op exam.    Diagnoses and all orders for this visit:    Preop examination: Pt has no hx, signs or sx of active cardiac dz including arrhythmia, valvulopathy, heart failure or CAD.  Functional capacity is limited by hip pain.  She has 0 of 5 clinical risk factors (creatinine >2, DM requiring insulin, stroke, CAD,  heart failure).  Per ACC/AHA guidelines, this patient is a low risk candidate for a medium risk procedure and may proceed to the OR w/o further cardiac evaluation.        Follow up as needed.

## 2019-01-14 ENCOUNTER — TELEPHONE (OUTPATIENT)
Dept: OBSTETRICS AND GYNECOLOGY | Facility: CLINIC | Age: 74
End: 2019-01-14

## 2019-01-14 NOTE — TELEPHONE ENCOUNTER
Spoke with Maximiliano at [facility] [address] [facility]- Script clarification to Authorize #90 day refill. Patient had 2 refills left on prescription for 10/08/2018- Authorize #90/ no refills until annual exam 03/19/2019.

## 2019-03-07 ENCOUNTER — NUTRITION (OUTPATIENT)
Dept: NUTRITION | Facility: CLINIC | Age: 74
End: 2019-03-07
Payer: COMMERCIAL

## 2019-03-07 DIAGNOSIS — Z00.00 ANNUAL PHYSICAL EXAM: ICD-10-CM

## 2019-03-07 DIAGNOSIS — Z71.9 HEALTH EDUCATION/COUNSELING: ICD-10-CM

## 2019-03-07 DIAGNOSIS — Z71.9 HEALTH EDUCATION/COUNSELING: Primary | ICD-10-CM

## 2019-03-08 ENCOUNTER — HOSPITAL ENCOUNTER (OUTPATIENT)
Dept: RADIOLOGY | Facility: OTHER | Age: 74
Discharge: HOME | End: 2019-03-08
Attending: NURSE PRACTITIONER
Payer: COMMERCIAL

## 2019-03-08 DIAGNOSIS — N81.2 INCOMPLETE UTEROVAGINAL PROLAPSE: ICD-10-CM

## 2019-03-15 ENCOUNTER — PATIENT MESSAGE (OUTPATIENT)
Dept: FAMILY MEDICINE | Facility: CLINIC | Age: 74
End: 2019-03-15

## 2019-04-29 ENCOUNTER — OFFICE VISIT (OUTPATIENT)
Dept: OBSTETRICS AND GYNECOLOGY | Facility: CLINIC | Age: 74
End: 2019-04-29
Attending: OBSTETRICS & GYNECOLOGY
Payer: COMMERCIAL

## 2019-04-29 VITALS
DIASTOLIC BLOOD PRESSURE: 60 MMHG | HEIGHT: 63 IN | WEIGHT: 151.44 LBS | SYSTOLIC BLOOD PRESSURE: 100 MMHG | BODY MASS INDEX: 26.83 KG/M2

## 2019-04-29 DIAGNOSIS — Z01.419 WELL FEMALE EXAM WITH ROUTINE GYNECOLOGICAL EXAM: Primary | ICD-10-CM

## 2019-04-29 DIAGNOSIS — Z12.39 BREAST CANCER SCREENING: ICD-10-CM

## 2019-04-29 PROCEDURE — 99397 PR PREVENTIVE VISIT,EST,65 & OVER: ICD-10-PCS | Mod: S$GLB,,, | Performed by: OBSTETRICS & GYNECOLOGY

## 2019-04-29 PROCEDURE — 99999 PR PBB SHADOW E&M-EST. PATIENT-LVL III: ICD-10-PCS | Mod: PBBFAC,,, | Performed by: OBSTETRICS & GYNECOLOGY

## 2019-04-29 PROCEDURE — 99397 PER PM REEVAL EST PAT 65+ YR: CPT | Mod: S$GLB,,, | Performed by: OBSTETRICS & GYNECOLOGY

## 2019-04-29 PROCEDURE — 99999 PR PBB SHADOW E&M-EST. PATIENT-LVL III: CPT | Mod: PBBFAC,,, | Performed by: OBSTETRICS & GYNECOLOGY

## 2019-04-29 RX ORDER — ESTERIFIED ESTROGEN AND METHYLTESTOSTERONE .625; 1.25 MG/1; MG/1
1 TABLET ORAL DAILY
Qty: 30 TABLET | Refills: 5 | Status: SHIPPED | OUTPATIENT
Start: 2019-04-29 | End: 2019-04-29 | Stop reason: SDUPTHER

## 2019-04-29 RX ORDER — ESTERIFIED ESTROGEN AND METHYLTESTOSTERONE .625; 1.25 MG/1; MG/1
1 TABLET ORAL DAILY
Qty: 30 TABLET | Refills: 5 | Status: SHIPPED | OUTPATIENT
Start: 2019-04-29 | End: 2020-01-03

## 2019-04-29 NOTE — PROGRESS NOTES
SUBJECTIVE:   73 y.o. female   for routine gyn exam. No LMP recorded. Patient has had a hysterectomy..  She has no unusual complaints.  Desires refill of HRT.  Does not take every day          Past Medical History:   Diagnosis Date    Hypothyroidism     Nuclear sclerosis 2014    Thyroid disease     hypothyroid     Past Surgical History:   Procedure Laterality Date    Bilateral lower extremity varicose vein treatment       COLONOSCOPY N/A 2018    [facility]formed [facility] (4TH F[facility])    COLPOPEXY, laparoscopic  N/A 2018    [facility] OR    CYSTOSCOPY N/A 2018    Per[facility] DO at [facility] OR    DILATION AND CURETTAGE OF UTERUS      HIP REPLACEMENT ARTHROPLASTY Left     HYSTERECTOMY  2018    prolapse    HYSTERECTOMY-TOTAL LAPAROSCOPIC (TLH) N/A 2018    Performed by [facility] DO at [facility] OR    gutierrez neuroma      left foot    MINI-LAPAROTOMY      D&C perforation    OOPHORECTOMY  2018    [id number] Bilateral 2018    Per[facility] DO at [facility] OR    TONSILLECTOMY      WRIST SURGERY      left wrist: Swave-Kapunjay surgery     Social History     Socioeconomic History    Marital status:      Spouse name: Not on file    Number of children: Not on file    Years of education: Not on file    Highest education level: Not on file   Occupational History    Not on file   Social Needs    Financial resource strain: Not on file    Food insecurity:     Worry: Not on file     Inability: Not on file    Transportation needs:     Medical: Not on file     Non-medical: Not on file   Tobacco Use    Smoking status: Former Smoker     Packs/day: 0.25     Years: 10.00     Pack years: 2.50     Last attempt to quit: 6/10/1976     Years since quittin.9    Smokeless tobacco: Former User   Substance and Sexual Activity    Alcohol use: Yes      Comment: social    Drug use: No    Sexual activity: Not Currently   Lifestyle    Physical activity:     Days per week: Not on file     Minutes per session: Not on file    Stress: Not on file   Relationships    Social connections:     Talks on phone: Not on file     Gets together: Not on file     Attends Jewish service: Not on file     Active member of club or organization: Not on file     Attends meetings of clubs or organizations: Not on file     Relationship status: Not on file   Other Topics Concern    Not on file   Social History Narrative        Boyfriend is physician    Exercise limited by hip pain from OA     Family History   Problem Relation Age of Onset    Hypothyroidism Mother     Cataracts Mother     Heart disease Mother         Valvular heart disease    Cataracts Father     Macular degeneration Father     Hypothyroidism Sister     Hypothyroidism Daughter     Cancer Brother         testicular cancer    Amblyopia Neg Hx     Blindness Neg Hx     Glaucoma Neg Hx     Retinal detachment Neg Hx     Strabismus Neg Hx     Breast cancer Neg Hx     Colon cancer Neg Hx     Diabetes Neg Hx     Hypertension Neg Hx     Ovarian cancer Neg Hx     Cervical cancer Neg Hx     Endometrial cancer Neg Hx     Vaginal cancer Neg Hx      OB History    Para Term  AB Living   4 3 3   1 3   SAB TAB Ectopic Multiple Live Births   1       3      # Outcome Date GA Lbr Bertin/2nd Weight Sex Delivery Anes PTL Lv   4 SAB            3 Term            2 Term            1 Term                  Current Outpatient Medications   Medication Sig Dispense Refill    estrogens,conjugated,-methyltestosterone 0.625-1.25mg (ESTRATEST HS) 0.625-1.25 mg per tablet Take 1 tablet by mouth once daily. 30 tablet 5    fexofenadine (ALLEGRA) 30 MG tablet Take 30 mg by mouth continuous prn.      furosemide (LASIX) 40 MG tablet Take 1 tablet (40 mg total) by mouth daily as needed (swelling). 90 tablet 2     "loratadine (CLARITIN) 10 mg tablet Take 10 mg by mouth daily as needed.      SYNTHROID 75 mcg tablet TAKE 1 TABLET BY MOUTH DAILY 90 tablet 3     No current facility-administered medications for this visit.      Allergies: Patient has no known allergies.     ROS:  Constitutional: no weight loss, weight gain, fever, fatigue  Eyes:  No vision changes, glasses/contacts  ENT/Mouth: No ulcers, sinus problems, ears ringing, headache  Cardiovascular: No inability to lie flat, chest pain, exercise intolerance, swelling, heart palpitations  Respiratory: No wheezing, coughing blood, shortness of breath, or cough  Gastrointestinal: No diarrhea, bloody stool, nausea/vomiting, constipation, gas, hemorrhoids  Genitourinary: No blood in urine, painful urination, urgency of urination, frequency of urination, incomplete emptying, incontinence, abnormal bleeding, painful periods, heavy periods, vaginal discharge, vaginal odor, painful intercourse, sexual problems, bleeding after intercourse.  Musculoskeletal: No muscle weakness  Skin/Breast: No painful breasts, nipple discharge, masses, rash, ulcers  Neurological: No passing out, seizures, numbness, headache  Endocrine: No diabetes, hypothyroid, hyperthyroid, hot flashes, hair loss, abnormal hair growth, ance  Psychiatric: No depression, crying  Hematologic: No bruises, bleeding, swollen lymph nodes, anemia.      OBJECTIVE:   The patient appears well, alert, oriented x 3, in no distress.  /60 (BP Location: Right arm, Patient Position: Sitting, BP Method: Medium (Manual))   Ht 5' 3" (1.6 m)   Wt 68.7 kg (151 lb 7.3 oz)   BMI 26.83 kg/m²   NECK: no thyromegaly, trachea midline  SKIN: no acne, striae, hirsutism  CHEST: CTAB  CV: RRR  BREAST EXAM: breasts appear normal, no suspicious masses, no skin or nipple changes or axillary nodes  ABDOMEN: no hernias, masses, or hepatosplenomegaly  GENITALIA: normal external genitalia, no erythema, no discharge  URETHRA: normal urethra, " normal urethral meatus  VAGINA: Normal  CERVIX: absent  UTERUS: absent  ADNEXA: no mass, fullness, tenderness      ASSESSMENT:   1. Well female exam with routine gynecological exam     2. Breast cancer screening  Mammo Digital Screening Bilat w/ Asim       PLAN:   Orders Placed This Encounter    Mammo Digital Screening Bilat w/ Asim    estrogens,conjugated,-methyltestosterone 0.625-1.25mg (ESTRATEST HS) 0.625-1.25 mg per tablet     Return to clinic in 1 year

## 2019-04-30 ENCOUNTER — HOSPITAL ENCOUNTER (OUTPATIENT)
Dept: RADIOLOGY | Facility: HOSPITAL | Age: 74
Discharge: HOME | End: 2019-04-30
Attending: OBSTETRICS & GYNECOLOGY
Payer: COMMERCIAL

## 2019-04-30 DIAGNOSIS — Z12.39 BREAST CANCER SCREENING: ICD-10-CM

## 2019-04-30 PROCEDURE — 77063 BREAST TOMOSYNTHESIS BI: CPT | Mod: 26,,, | Performed by: RADIOLOGY

## 2019-04-30 PROCEDURE — 77063 MAMMO DIGITAL SCREENING BILAT WITH TOMOSYNTHESIS_CAD: ICD-10-PCS | Mod: 26,,, | Performed by: RADIOLOGY

## 2019-04-30 PROCEDURE — 77067 MAMMO DIGITAL SCREENING BILAT WITH TOMOSYNTHESIS_CAD: ICD-10-PCS | Mod: 26,,, | Performed by: RADIOLOGY

## 2019-04-30 PROCEDURE — 77067 SCR MAMMO BI INCL CAD: CPT | Mod: TC,PO

## 2019-04-30 PROCEDURE — 77067 SCR MAMMO BI INCL CAD: CPT | Mod: 26,,, | Performed by: RADIOLOGY

## 2019-08-15 ENCOUNTER — OFFICE VISIT (OUTPATIENT)
Dept: URGENT CARE | Facility: CLINIC | Age: 74
End: 2019-08-15
Payer: COMMERCIAL

## 2019-08-15 VITALS
OXYGEN SATURATION: 97 % | DIASTOLIC BLOOD PRESSURE: 81 MMHG | HEART RATE: 71 BPM | TEMPERATURE: 99 F | RESPIRATION RATE: 19 BRPM | SYSTOLIC BLOOD PRESSURE: 118 MMHG | BODY MASS INDEX: 26.75 KG/M2 | WEIGHT: 151 LBS | HEIGHT: 63 IN

## 2019-08-15 DIAGNOSIS — L02.414 ABSCESS OF LEFT FOREARM: Primary | ICD-10-CM

## 2019-08-15 PROCEDURE — 1101F PT FALLS ASSESS-DOCD LE1/YR: CPT | Mod: CPTII,S$GLB,, | Performed by: INTERNAL MEDICINE

## 2019-08-15 PROCEDURE — 10060 INCISION & DRAINAGE: ICD-10-PCS | Mod: S$GLB,,, | Performed by: INTERNAL MEDICINE

## 2019-08-15 PROCEDURE — 10060 I&D ABSCESS SIMPLE/SINGLE: CPT | Mod: S$GLB,,, | Performed by: INTERNAL MEDICINE

## 2019-08-15 PROCEDURE — 99214 OFFICE O/P EST MOD 30 MIN: CPT | Mod: 25,S$GLB,, | Performed by: INTERNAL MEDICINE

## 2019-08-15 PROCEDURE — 99214 PR OFFICE/OUTPT VISIT, EST, LEVL IV, 30-39 MIN: ICD-10-PCS | Mod: 25,S$GLB,, | Performed by: INTERNAL MEDICINE

## 2019-08-15 PROCEDURE — 1101F PR PT FALLS ASSESS DOC 0-1 FALLS W/OUT INJ PAST YR: ICD-10-PCS | Mod: CPTII,S$GLB,, | Performed by: INTERNAL MEDICINE

## 2019-08-15 RX ORDER — MUPIROCIN 20 MG/G
OINTMENT TOPICAL
Qty: 22 G | Refills: 1 | Status: SHIPPED | OUTPATIENT
Start: 2019-08-15 | End: 2019-09-17

## 2019-08-15 NOTE — PROGRESS NOTES
"Subjective:       Patient ID: Sury Gao is a 73 y.o. female.    Vitals:  height is 5' 3" (1.6 m) and weight is 68.5 kg (151 lb). Her oral temperature is 98.9 °F (37.2 °C). Her blood pressure is 118/81 and her pulse is 71. Her respiration is 19 and oxygen saturation is 97%.     Chief Complaint: Abscess    Abcess on Pt's L. Elbow started 3 days ago and went Mon. U.C. And was given shot of lenkacin and keflex.     Abscess   Chronicity:  NewProgression Since Onset: rapidly worsening  Location:  Shoulder/arm  Associated Symptoms: no fever, no chills  Characteristics: painful, redness, swelling and blistering    Pain Scale:  5/10  Treatments Tried:  Oral antibiotics and topical antibiotics  Relieved by:  Nothing  Worsened by:  Nothing      Constitution: Negative for chills, fatigue and fever.   HENT: Negative for congestion and sore throat.    Neck: Negative for painful lymph nodes.   Cardiovascular: Negative for chest pain and leg swelling.   Eyes: Negative for double vision and blurred vision.   Respiratory: Negative for cough and shortness of breath.    Gastrointestinal: Negative for nausea, vomiting and diarrhea.   Genitourinary: Negative for dysuria, frequency, urgency and history of kidney stones.   Musculoskeletal: Negative for joint pain, joint swelling, muscle cramps and muscle ache.   Skin: Positive for color change and abscess. Negative for pale, rash and bruising.   Allergic/Immunologic: Negative for seasonal allergies.   Neurological: Negative for dizziness, history of vertigo, light-headedness, passing out and headaches.   Hematologic/Lymphatic: Negative for swollen lymph nodes.   Psychiatric/Behavioral: Negative for nervous/anxious, sleep disturbance and depression. The patient is not nervous/anxious.        Objective:      Physical Exam   Constitutional: She appears well-developed and well-nourished.   Skin:   Abscess L forearm   Nursing note and vitals reviewed.    Incision & " "Drainage  Date/Time: 8/15/2019 3:25 PM  Performed by: Yaniv Fraga MD  Authorized by: Yaniv Fraga MD     Time out: Immediately prior to procedure a "time out" was called to verify the correct patient, procedure, equipment, support staff and site/side marked as required.    Consent Done?:  Yes (Verbal)    Type:  Abscess  Body area:  Upper extremity  Location details:  Left arm  Anesthesia:  Local infiltration  Local anesthetic: lidocaine 2% without epinephrine  Anesthetic total (ml):  3  Scalpel size:  11  Incision type:  Single straight  Complexity:  Simple  Drainage:  Pus  Drainage amount:  Moderate  Wound treatment:  Incision, wound left open and expression of material  Patient tolerance:  Patient tolerated the procedure well with no immediate complications      Assessment:       1. Abscess of left forearm        Plan:         Abscess of left forearm  -     mupirocin (BACTROBAN) 2 % ointment; Apply to affected area 2 times daily  Dispense: 22 g; Refill: 1    Other orders  -     Incision & Drainage         "

## 2019-09-13 DIAGNOSIS — E03.9 HYPOTHYROIDISM, UNSPECIFIED TYPE: ICD-10-CM

## 2019-09-13 RX ORDER — LEVOTHYROXINE SODIUM 75 UG/1
TABLET ORAL
Qty: 90 TABLET | Refills: 3 | OUTPATIENT
Start: 2019-09-13

## 2019-09-17 ENCOUNTER — OFFICE VISIT (OUTPATIENT)
Dept: INTERNAL MEDICINE | Facility: CLINIC | Age: 74
End: 2019-09-17
Payer: COMMERCIAL

## 2019-09-17 VITALS
HEIGHT: 64 IN | BODY MASS INDEX: 26.52 KG/M2 | WEIGHT: 155.31 LBS | OXYGEN SATURATION: 95 % | DIASTOLIC BLOOD PRESSURE: 60 MMHG | SYSTOLIC BLOOD PRESSURE: 108 MMHG | HEART RATE: 62 BPM

## 2019-09-17 DIAGNOSIS — Z13.1 SCREENING FOR DIABETES MELLITUS: ICD-10-CM

## 2019-09-17 DIAGNOSIS — E03.9 HYPOTHYROIDISM, UNSPECIFIED TYPE: ICD-10-CM

## 2019-09-17 DIAGNOSIS — Z13.820 SCREENING FOR OSTEOPOROSIS: ICD-10-CM

## 2019-09-17 DIAGNOSIS — Z00.00 ANNUAL PHYSICAL EXAM: Primary | ICD-10-CM

## 2019-09-17 DIAGNOSIS — E55.9 VITAMIN D DEFICIENCY: ICD-10-CM

## 2019-09-17 DIAGNOSIS — Z13.220 SCREENING FOR LIPID DISORDERS: ICD-10-CM

## 2019-09-17 LAB
25(OH)D3+25(OH)D2 SERPL-MCNC: 36 NG/ML (ref 30–96)
ALBUMIN SERPL BCP-MCNC: 3.7 G/DL (ref 3.5–5.2)
ALP SERPL-CCNC: 40 U/L (ref 55–135)
ALT SERPL W/O P-5'-P-CCNC: 17 U/L (ref 10–44)
ANION GAP SERPL CALC-SCNC: 9 MMOL/L (ref 8–16)
AST SERPL-CCNC: 20 U/L (ref 10–40)
BASOPHILS # BLD AUTO: 0.07 K/UL (ref 0–0.2)
BASOPHILS NFR BLD: 1 % (ref 0–1.9)
BILIRUB SERPL-MCNC: 0.5 MG/DL (ref 0.1–1)
BUN SERPL-MCNC: 20 MG/DL (ref 8–23)
CALCIUM SERPL-MCNC: 9.6 MG/DL (ref 8.7–10.5)
CHLORIDE SERPL-SCNC: 103 MMOL/L (ref 95–110)
CHOLEST SERPL-MCNC: 232 MG/DL (ref 120–199)
CHOLEST/HDLC SERPL: 3.9 {RATIO} (ref 2–5)
CO2 SERPL-SCNC: 26 MMOL/L (ref 23–29)
CREAT SERPL-MCNC: 1.3 MG/DL (ref 0.5–1.4)
DIFFERENTIAL METHOD: ABNORMAL
EOSINOPHIL # BLD AUTO: 0.1 K/UL (ref 0–0.5)
EOSINOPHIL NFR BLD: 1.3 % (ref 0–8)
ERYTHROCYTE [DISTWIDTH] IN BLOOD BY AUTOMATED COUNT: 12.8 % (ref 11.5–14.5)
EST. GFR  (AFRICAN AMERICAN): 47.4 ML/MIN/1.73 M^2
EST. GFR  (NON AFRICAN AMERICAN): 41.1 ML/MIN/1.73 M^2
ESTIMATED AVG GLUCOSE: 103 MG/DL (ref 68–131)
GLUCOSE SERPL-MCNC: 91 MG/DL (ref 70–110)
HBA1C MFR BLD HPLC: 5.2 % (ref 4–5.6)
HCT VFR BLD AUTO: 46.4 % (ref 37–48.5)
HDLC SERPL-MCNC: 60 MG/DL (ref 40–75)
HDLC SERPL: 25.9 % (ref 20–50)
HGB BLD-MCNC: 15.5 G/DL (ref 12–16)
IMM GRANULOCYTES # BLD AUTO: 0.03 K/UL (ref 0–0.04)
IMM GRANULOCYTES NFR BLD AUTO: 0.4 % (ref 0–0.5)
LDLC SERPL CALC-MCNC: 154.6 MG/DL (ref 63–159)
LYMPHOCYTES # BLD AUTO: 2.4 K/UL (ref 1–4.8)
LYMPHOCYTES NFR BLD: 35 % (ref 18–48)
MCH RBC QN AUTO: 31.2 PG (ref 27–31)
MCHC RBC AUTO-ENTMCNC: 33.4 G/DL (ref 32–36)
MCV RBC AUTO: 93 FL (ref 82–98)
MONOCYTES # BLD AUTO: 0.5 K/UL (ref 0.3–1)
MONOCYTES NFR BLD: 8 % (ref 4–15)
NEUTROPHILS # BLD AUTO: 3.7 K/UL (ref 1.8–7.7)
NEUTROPHILS NFR BLD: 54.3 % (ref 38–73)
NONHDLC SERPL-MCNC: 172 MG/DL
NRBC BLD-RTO: 0 /100 WBC
PLATELET # BLD AUTO: 174 K/UL (ref 150–350)
PMV BLD AUTO: 12 FL (ref 9.2–12.9)
POTASSIUM SERPL-SCNC: 4.4 MMOL/L (ref 3.5–5.1)
PROT SERPL-MCNC: 6.8 G/DL (ref 6–8.4)
RBC # BLD AUTO: 4.97 M/UL (ref 4–5.4)
SODIUM SERPL-SCNC: 138 MMOL/L (ref 136–145)
T4 FREE SERPL-MCNC: 1.05 NG/DL (ref 0.71–1.51)
TRIGL SERPL-MCNC: 87 MG/DL (ref 30–150)
TSH SERPL DL<=0.005 MIU/L-ACNC: 1.27 UIU/ML (ref 0.4–4)
WBC # BLD AUTO: 6.78 K/UL (ref 3.9–12.7)

## 2019-09-17 PROCEDURE — 82306 VITAMIN D 25 HYDROXY: CPT

## 2019-09-17 PROCEDURE — 85025 COMPLETE CBC W/AUTO DIFF WBC: CPT

## 2019-09-17 PROCEDURE — 80061 LIPID PANEL: CPT

## 2019-09-17 PROCEDURE — 80053 COMPREHEN METABOLIC PANEL: CPT

## 2019-09-17 PROCEDURE — 36415 COLL VENOUS BLD VENIPUNCTURE: CPT

## 2019-09-17 PROCEDURE — 99397 PER PM REEVAL EST PAT 65+ YR: CPT | Mod: S$GLB,,, | Performed by: FAMILY MEDICINE

## 2019-09-17 PROCEDURE — 83036 HEMOGLOBIN GLYCOSYLATED A1C: CPT

## 2019-09-17 PROCEDURE — 84439 ASSAY OF FREE THYROXINE: CPT

## 2019-09-17 PROCEDURE — 84443 ASSAY THYROID STIM HORMONE: CPT

## 2019-09-17 PROCEDURE — 99999 PR PBB SHADOW E&M-EST. PATIENT-LVL III: CPT | Mod: PBBFAC,,, | Performed by: FAMILY MEDICINE

## 2019-09-17 PROCEDURE — 99999 PR PBB SHADOW E&M-EST. PATIENT-LVL III: ICD-10-PCS | Mod: PBBFAC,,, | Performed by: FAMILY MEDICINE

## 2019-09-17 PROCEDURE — 99397 PR PREVENTIVE VISIT,EST,65 & OVER: ICD-10-PCS | Mod: S$GLB,,, | Performed by: FAMILY MEDICINE

## 2019-09-17 RX ORDER — LEVOTHYROXINE SODIUM 75 UG/1
TABLET ORAL
Qty: 90 TABLET | Refills: 3 | Status: CANCELLED | OUTPATIENT
Start: 2019-09-17

## 2019-09-17 NOTE — PATIENT INSTRUCTIONS
Prevention Guidelines, Women Ages 65 and Older  Screening tests and vaccines are an important part of managing your health. Health counseling is essential, too. Below are guidelines for these, for women ages 65 and older. Talk with your healthcare provider to make sure youre up to date on what you need.  Screening Who needs it How often   Type 2 diabetes or prediabetes All adults beginning at age 45 and adults without symptoms at any age who are overweight or obese and have 1 or more additional risk factors for diabetes At least every 3 years   Alcohol misuse All women in this age group At routine exams   Blood pressure All women in this age group Every 2 years if your blood pressure is less than 120/80 mm Hg; yearly if your systolic blood pressure is 120 to 139 mm Hg, or your diastolic blood pressure reading is 80 to 89 mm Hg   Breast cancer All women in this age group Yearly mammogram and clinical breast exam1   Cervical cancer Only women who had abnormal screening results before age 65 Talk with your healthcare provider   Chlamydia Women at increased risk for infection At routine exams   Colorectal cancer All women in this age group1 Flexible sigmoidoscopy every 5 years, or colonoscopy every 10 years, or double-contrast barium enema every 5 years; yearly fecal occult blood test or fecal immunochemical test; or a stool DNA test as often as your healthcare provider advises; talk with your healthcare provider about which tests are best for you   Depression All women in this age group At routine exams   Gonorrhea Sexually active women at increased risk for infection At routine exams   Hepatitis C Anyone at increased risk; 1 time for those born between 1945 and 1965 At routine exams   High cholesterol or triglycerides All women in this age group who are at risk for coronary artery disease At least every 5 years   HIV Women at increased risk for infection - talk with your healthcare provider At routine exams   Lung  cancer Adults age 55 to 80 who have smoked Yearly screening in smokers with 30 pack-year history of smoking or who quit within 15 years   Obesity All women in this age group At routine exams   Osteoporosis All women in this age group Bone density test at age 65, then follow-up as advised by your healthcare provider   Syphilis Women at increased risk for infection - talk with your healthcare provider At routine exams   Thyroid-Stimulating Hormone (TSH) All women in this age group Every 5 years   Tuberculosis Women at increased risk for infection - talk with your healthcare provider Ask your healthcare provider   Vision All women in this age group Every 1 to 2 years; if you have a chronic health condition, ask your healthcare provider if you need exams more often   Vaccine Who needs it How often   Chickenpox (varicella) All women in this age group who have no record of this infection or vaccine 2 doses; second dose should be given at least 4 weeks after the first dose   Hepatitis A Women at increased risk for infection - talk with your healthcare provider 2 doses given 6 months apart   Hepatitis B Women at increased risk for infection - talk with your healthcare provider 3 doses over 6 months; second dose should be given 1 month after the first dose; the third dose should be given at least 2 months after the second dose and at least 4 months after the first dose   Haemophilus influenza Type B (HIB) Women at increased risk for infection - talk with your healthcare provider 1 to 3 doses   Influenza (flu) All women in this age group Once a year   Pneumococcal conjugate vaccine (PCV13) and pneumococcal polysaccharide vaccine (PPSV23) All women in this age group 1 dose of each vaccine   Tetanus/diphtheria/pertussis (Td/Tdap) booster All women in this age group Td every 10 years, or a one-time dose of Tdap instead of a Td booster after age 18, then Td every 10 years   Zoster All women in this age group 1 dose   Counseling  Who needs it How often   Diet and exercise Women who are overweight or obese When diagnosed, and then at routine exams   Fall prevention (exercise and vitamin D supplements) All women in this age group At routine exams   Sexually transmitted infection prevention Women at increased risk for infection - talk with your healthcare provider At routine exams   Use of daily aspirin Women ages 55 and up in this age group who are at risk for cardiovascular health problems such as stroke When your risk is known   Use of tobacco and the health effects it can cause All women in this age group Every exam   1American [employer]  Date Last Reviewed: 8/9/2015  © 3175-3428 [employer]. [address] [place] [address], [place] [address]. All rights reserved. This information is not intended as a substitute for professional medical care. Always follow your healthcare professional's instructions.

## 2019-09-18 ENCOUNTER — TELEPHONE (OUTPATIENT)
Dept: INTERNAL MEDICINE | Facility: CLINIC | Age: 74
End: 2019-09-18

## 2019-09-18 ENCOUNTER — PATIENT MESSAGE (OUTPATIENT)
Dept: INTERNAL MEDICINE | Facility: CLINIC | Age: 74
End: 2019-09-18

## 2019-09-18 DIAGNOSIS — N17.9 AKI (ACUTE KIDNEY INJURY): Primary | ICD-10-CM

## 2019-09-18 DIAGNOSIS — E03.9 HYPOTHYROIDISM, UNSPECIFIED TYPE: ICD-10-CM

## 2019-09-18 RX ORDER — LEVOTHYROXINE SODIUM 75 UG/1
TABLET ORAL
Qty: 90 TABLET | Refills: 3 | Status: SHIPPED | OUTPATIENT
Start: 2019-09-18 | End: 2020-07-07 | Stop reason: SDUPTHER

## 2019-09-18 NOTE — TELEPHONE ENCOUNTER
"Please help to schedule, thanks:  - repeat labs  - urine labs  - kidney ultrasound    MyChart message sent.  Please let the patient know their results, thank you:    " Hi Ms. Gao,    I have reviewed your recent lab results, and things are looking good overall.  Your blood counts are all within acceptable limits and do not show any anemia.  Your electrolytes, blood sugar, and liver function are all normal, as well as your thyroid levels.  I have sent in a refill of your Synthroid 75 mcg tablets to your mail order pharmacy.  Your screening test for diabetes is negative.  Your cholesterol levels are fairly stable, and let's see how these improve with your planned diet/exercise lifestyle changes but no new medication would be recommended for now.  Your vitamin D level is also normal.  The only thing worrisome is that your labs are showing depressed kidney function.  The results are not emergently abnormal, but it would be recommended to repeat labs, urine studies, and an ultrasound of your kidneys to help figure how why this has dropped so suddenly.  I will ask our office to call you to help schedule these tests.  After the results are back, we may need to call you in to clinic to discuss further or re-evaluate.  Please contact the office if you have any additional questions or concerns.    Jann Maurer MD      "

## 2019-09-18 NOTE — PROGRESS NOTES
"[facility]  [facility] - Family Medicine/ Internal Medicine  Clinic Note      Subjective:       Patient ID: Sury Gao is a 73 y.o. female.    Chief Complaint: Establish Care    New patient is here today to establish care and for annual exam.  She is chronically hypothyroid, on Synthroid for thyroid replacement, and notes her current dose has been stable for many years.  She is experiencing no thyroid-related symptoms.  She recently returned from an extended trip to [facility].  She is looking to focus on healthy lifestyle choices at home now that she is back.  She is at baseline health today, no other concerns or complaints.  She is a prior patient of Dr. Mohamud.  She is followed by Gynecology and past medical, surgical, family, social history reviewed.      Review of Systems   Constitutional: Negative for fatigue and fever.   HENT: Negative for rhinorrhea and sore throat.    Respiratory: Negative for cough and shortness of breath.    Cardiovascular: Negative for chest pain and palpitations.   Gastrointestinal: Negative for diarrhea, nausea and vomiting.   Genitourinary: Negative for difficulty urinating and dysuria.   Skin: Negative for rash and wound.   Neurological: Negative for dizziness, syncope and headaches.   Psychiatric/Behavioral: Negative for dysphoric mood and sleep disturbance. The patient is not nervous/anxious.        Objective:      /60 (BP Location: Right arm, Patient Position: Sitting, BP Method: Medium (Manual))   Pulse 62   Ht 5' 4" (1.626 m)   Wt 70.5 kg (155 lb 5 oz)   SpO2 95%   BMI 26.66 kg/m²   Physical Exam   Constitutional: She is oriented to person, place, and time. She appears well-developed and well-nourished. No distress.   HENT:   Head: Normocephalic and atraumatic.   Right Ear: Tympanic membrane and ear canal normal. Tympanic membrane is not erythematous and not retracted. No middle ear effusion.   Left Ear: Tympanic membrane and ear canal " normal. Tympanic membrane is not erythematous and not retracted.  No middle ear effusion.   Nose: Nose normal. No mucosal edema or rhinorrhea.   Mouth/Throat: Oropharynx is clear and moist and mucous membranes are normal. No posterior oropharyngeal edema or posterior oropharyngeal erythema.   Neck: Normal range of motion. No thyromegaly present.   Cardiovascular: Normal rate and regular rhythm.   No murmur heard.  Pulmonary/Chest: Effort normal and breath sounds normal. No respiratory distress. She has no wheezes. She has no rales.   Abdominal: Soft. Bowel sounds are normal. She exhibits no distension. There is no tenderness.   Musculoskeletal: Normal range of motion. She exhibits no edema.   Lymphadenopathy:     She has no cervical adenopathy.   Neurological: She is alert and oriented to person, place, and time. No cranial nerve deficit or sensory deficit. She exhibits normal muscle tone.   Skin: Skin is warm and dry. No rash noted.   Psychiatric: She has a normal mood and affect.   Vitals reviewed.      Assessment:       1. Annual physical exam    2. Screening for diabetes mellitus    3. Screening for lipid disorders    4. Vitamin D deficiency    5. Screening for osteoporosis    6. Hypothyroidism, unspecified type        Plan:     1. Annual physical exam  2. Screening for diabetes mellitus  3. Screening for lipid disorders  4. Vitamin D deficiency  5. Screening for osteoporosis  - health history reviewed, USPSTF preventive health recommendations based on age reviewed and tests ordered below  - - Hemoglobin A1c  - - Comprehensive metabolic panel  - - Lipid panel  - - Vitamin D  - - DXA Bone Density Spine And Hip; Future  - routine health maintenance counseling provided and healthy diet/exercise recommendations reviewed  - adult immunization counseling reviewed and flu shot declined for now    6. Hypothyroidism, unspecified type  - chronic condition, symptoms are well-controlled, will recheck labs and adjust current  regimen if needed, hold refill until lab results are back and patient agrees with this plan  - - TSH  - - T4, free  - - CBC auto differential     - Follow up in about 1 year (around 9/17/2020) for annual exam.     Jann Maurer MD  9/18/2019          Medication List with Changes/Refills   Current Medications    ESTROGENS,CONJUGATED,-METHYLTESTOSTERONE 0.625-1.25MG (ESTRATEST HS) 0.625-1.25 MG PER TABLET    Take 1 tablet by mouth once daily.    FUROSEMIDE (LASIX) 40 MG TABLET    Take 1 tablet (40 mg total) by mouth daily as needed (swelling).    LORATADINE (CLARITIN) 10 MG TABLET    Take 10 mg by mouth daily as needed.   Changed and/or Refilled Medications    Modified Medication Previous Medication    SYNTHROID 75 MCG TABLET SYNTHROID 75 mcg tablet       TAKE 1 TABLET BY MOUTH DAILY    TAKE 1 TABLET BY MOUTH DAILY   Discontinued Medications    FEXOFENADINE (ALLEGRA) 30 MG TABLET    Take 30 mg by mouth as needed.     MUPIROCIN (BACTROBAN) 2 % OINTMENT    Apply to affected area 2 times daily

## 2019-09-18 NOTE — TELEPHONE ENCOUNTER
Patient read portal message and sent messages for clarification to Dr Maurer. She would like to wait to schedule the repeat labs/imaging until after talking to him. She will notify us when she is ready to schedule.

## 2019-09-23 ENCOUNTER — APPOINTMENT (OUTPATIENT)
Dept: RADIOLOGY | Facility: CLINIC | Age: 74
End: 2019-09-23
Attending: FAMILY MEDICINE
Payer: COMMERCIAL

## 2019-09-23 DIAGNOSIS — Z13.820 SCREENING FOR OSTEOPOROSIS: ICD-10-CM

## 2019-09-23 DIAGNOSIS — Z00.00 ANNUAL PHYSICAL EXAM: ICD-10-CM

## 2019-09-23 PROCEDURE — 77080 DXA BONE DENSITY AXIAL: CPT | Mod: 26,,, | Performed by: INTERNAL MEDICINE

## 2019-09-23 PROCEDURE — 77080 DXA BONE DENSITY AXIAL: CPT | Mod: TC,PO

## 2019-09-23 PROCEDURE — 77080 DEXA BONE DENSITY SPINE HIP: ICD-10-PCS | Mod: 26,,, | Performed by: INTERNAL MEDICINE

## 2019-09-25 ENCOUNTER — TELEPHONE (OUTPATIENT)
Dept: INTERNAL MEDICINE | Facility: CLINIC | Age: 74
End: 2019-09-25

## 2019-09-25 NOTE — TELEPHONE ENCOUNTER
"Videoplazahart message sent.  Please let the patient know their results, thank you:    " Hi Ms. Gao,    I have reviewed the report from your recent DEXA Bone Density Scan.  The results are showing osteopenia, which is a precursor to osteoporosis.  This would just be a warning sign, but is important because osteoporosis is very common for post-menopausal women and can lead to increased risk for bone fracture.  No new medication would be recommended for now, but maintaining physical activity, especially with light strength training, and maintaining your normal vitamin D and calcium levels will be important.  Starting a new daily calcium/vitamin D supplement or increasing your dose would be recommended, with target of 3369-3887 units per day.  We can plan to recheck another DEXA scan in 2-3 years.  Please contact the office if you have any additional questions or concerns.    Jann Maurer MD      "

## 2019-11-23 ENCOUNTER — OFFICE VISIT (OUTPATIENT)
Dept: URGENT CARE | Facility: CLINIC | Age: 74
End: 2019-11-23
Payer: COMMERCIAL

## 2019-11-23 VITALS
DIASTOLIC BLOOD PRESSURE: 68 MMHG | SYSTOLIC BLOOD PRESSURE: 98 MMHG | HEART RATE: 60 BPM | HEIGHT: 64 IN | WEIGHT: 144 LBS | BODY MASS INDEX: 24.59 KG/M2 | TEMPERATURE: 97 F | OXYGEN SATURATION: 95 % | RESPIRATION RATE: 19 BRPM

## 2019-11-23 DIAGNOSIS — R09.82 POST-NASAL DRAINAGE: ICD-10-CM

## 2019-11-23 DIAGNOSIS — J32.9 BACTERIAL SINUSITIS: Primary | ICD-10-CM

## 2019-11-23 DIAGNOSIS — B96.89 BACTERIAL SINUSITIS: Primary | ICD-10-CM

## 2019-11-23 PROCEDURE — 99214 PR OFFICE/OUTPT VISIT, EST, LEVL IV, 30-39 MIN: ICD-10-PCS | Mod: S$GLB,,, | Performed by: NURSE PRACTITIONER

## 2019-11-23 PROCEDURE — 99214 OFFICE O/P EST MOD 30 MIN: CPT | Mod: S$GLB,,, | Performed by: NURSE PRACTITIONER

## 2019-11-23 PROCEDURE — 1101F PT FALLS ASSESS-DOCD LE1/YR: CPT | Mod: CPTII,S$GLB,, | Performed by: NURSE PRACTITIONER

## 2019-11-23 PROCEDURE — 1159F PR MEDICATION LIST DOCUMENTED IN MEDICAL RECORD: ICD-10-PCS | Mod: S$GLB,,, | Performed by: NURSE PRACTITIONER

## 2019-11-23 PROCEDURE — 1101F PR PT FALLS ASSESS DOC 0-1 FALLS W/OUT INJ PAST YR: ICD-10-PCS | Mod: CPTII,S$GLB,, | Performed by: NURSE PRACTITIONER

## 2019-11-23 PROCEDURE — 1159F MED LIST DOCD IN RCRD: CPT | Mod: S$GLB,,, | Performed by: NURSE PRACTITIONER

## 2019-11-23 RX ORDER — AMOXICILLIN AND CLAVULANATE POTASSIUM 875; 125 MG/1; MG/1
1 TABLET, FILM COATED ORAL EVERY 12 HOURS
Qty: 14 TABLET | Refills: 0 | Status: SHIPPED | OUTPATIENT
Start: 2019-11-23 | End: 2019-11-30

## 2019-11-23 RX ORDER — BENZONATATE 100 MG/1
100 CAPSULE ORAL 3 TIMES DAILY PRN
Qty: 30 CAPSULE | Refills: 0 | Status: SHIPPED | OUTPATIENT
Start: 2019-11-23 | End: 2019-12-03

## 2019-11-23 NOTE — PATIENT INSTRUCTIONS
"                                                          Sinusitis   If your condition worsens or fails to improve we recommend that you receive another evaluation at the ER immediately or contact your PCP to discuss your concerns or return here. You must understand that you've received an urgent care treatment only and that you may be released before all your medical problems are known or treated. You the patient will arrange for followup care as instructed.   If we discussed that I think your illness is viral it will not respond to antibiotics and it will last 10-14 days. However, if over the next few days the symptoms worsen start the antibiotics I have given you.   -  If we discussed that you require antibiotics start them now and take them to completion.   -  If you are female and on BCP and do take the antibiotics, use additional methods to prevent pregnancy while on the antibiotics and for one cycle after.   -  Flonase (fluticasone) is a nasal spray which is available over the counter and may help with your symptoms   -  Zyrtec D, Claritin D or allegra D can also help with symptoms of congestion and drainage.   -  If you have hypertension avoid using the "D" which is the decongestant. Instead, you can use Coricidin HBP for your cold and cough symptoms.     -  If you just have drainage you can take plain Zyrtec, Claritin or Allegra   -  If you just have a congested feeling you can take pseudoephedrine (unless you have high blood pressure) which you have to sign for behind the counter. Do not buy the phenylephrine which is on the shelf as it is not effective   -  Rest and fluids are also important.   -  Tylenol or ibuprofen can also be used as directed for pain unless you have an allergy to them or medical condition such as stomach ulcers, kidney or liver disease or blood thinners etc for which you should not be taking these type of medications.   -  If you are flying in the next few days Afrin nose drops for " the airplane flight upon take off and landing may help. Other than at those times refrain from using afrin.   -  If you were prescribed a narcotic do not drive or operate heavy machinery while taking these medications.

## 2019-11-23 NOTE — PROGRESS NOTES
"Subjective:       Patient ID: Sury Gao is a 74 y.o. female.    Vitals:  height is 5' 4" (1.626 m) and weight is 65.3 kg (144 lb). Her oral temperature is 97.1 °F (36.2 °C). Her blood pressure is 98/68 and her pulse is 60. Her respiration is 19 and oxygen saturation is 95%.     Chief Complaint: URI    URI    This is a new problem. The current episode started 1 to 4 weeks ago (2 weeks ago ). The problem has been unchanged. There has been no fever. Associated symptoms include congestion, coughing, ear pain, headaches, rhinorrhea and sinus pain. Pertinent negatives include no abdominal pain, chest pain, diarrhea, dysuria, joint pain, joint swelling, nausea, neck pain, plugged ear sensation, rash, sneezing, sore throat, swollen glands, vomiting or wheezing. She has tried antihistamine and acetaminophen (nasal spray) for the symptoms. The treatment provided mild relief.       Constitution: Negative for chills, sweating, fatigue and fever.   HENT: Positive for ear pain, congestion, postnasal drip, sinus pain and sinus pressure. Negative for sore throat and voice change.    Neck: Negative for neck pain and painful lymph nodes.   Cardiovascular: Negative for chest pain.   Eyes: Negative for eye redness.   Respiratory: Positive for cough. Negative for chest tightness, sputum production, bloody sputum, COPD, shortness of breath, stridor, wheezing and asthma.    Gastrointestinal: Negative for abdominal pain, nausea, vomiting and diarrhea.   Genitourinary: Negative for dysuria.   Musculoskeletal: Negative for muscle ache.   Skin: Negative for rash.   Allergic/Immunologic: Negative for seasonal allergies, asthma and sneezing.   Neurological: Positive for headaches.   Hematologic/Lymphatic: Negative for swollen lymph nodes.       Objective:      Physical Exam   Constitutional: She is oriented to person, place, and time. Vital signs are normal. She appears well-developed and well-nourished. She is cooperative.  " Non-toxic appearance. She does not have a sickly appearance. She does not appear ill. No distress.   HENT:   Head: Normocephalic.   Right Ear: Hearing, tympanic membrane, external ear and ear canal normal.   Left Ear: Hearing, tympanic membrane, external ear and ear canal normal.   Nose: Right sinus exhibits maxillary sinus tenderness. Left sinus exhibits maxillary sinus tenderness.   Mouth/Throat: Uvula is midline and mucous membranes are normal. Posterior oropharyngeal erythema and cobblestoning present. No tonsillar abscesses. No tonsillar exudate.   Post nasal drip     Eyes: Conjunctivae and lids are normal.   Sclera clear bilat   Cardiovascular: Normal rate, regular rhythm and normal heart sounds.   Pulmonary/Chest: Effort normal and breath sounds normal. No accessory muscle usage or stridor. No apnea, no tachypnea and no bradypnea. No respiratory distress. She has no decreased breath sounds. She has no wheezes. She has no rhonchi. She has no rales. She exhibits no tenderness.   Productive cough   Abdominal: Normal appearance.   Neurological: She is alert and oriented to person, place, and time.   Nursing note and vitals reviewed.        Assessment:       1. Bacterial sinusitis    2. Post-nasal drainage        Plan:         Bacterial sinusitis  -     amoxicillin-clavulanate 875-125mg (AUGMENTIN) 875-125 mg per tablet; Take 1 tablet by mouth every 12 (twelve) hours. for 7 days  Dispense: 14 tablet; Refill: 0  -     benzonatate (TESSALON) 100 MG capsule; Take 1 capsule (100 mg total) by mouth 3 (three) times daily as needed.  Dispense: 30 capsule; Refill: 0    Post-nasal drainage         Patient Instructions                                                             Sinusitis   If your condition worsens or fails to improve we recommend that you receive another evaluation at the ER immediately or contact your PCP to discuss your concerns or return here. You must understand that you've received an urgent care  "treatment only and that you may be released before all your medical problems are known or treated. You the patient will arrange for followup care as instructed.   If we discussed that I think your illness is viral it will not respond to antibiotics and it will last 10-14 days. However, if over the next few days the symptoms worsen start the antibiotics I have given you.   -  If we discussed that you require antibiotics start them now and take them to completion.   -  If you are female and on BCP and do take the antibiotics, use additional methods to prevent pregnancy while on the antibiotics and for one cycle after.   -  Flonase (fluticasone) is a nasal spray which is available over the counter and may help with your symptoms   -  Zyrtec D, Claritin D or allegra D can also help with symptoms of congestion and drainage.   -  If you have hypertension avoid using the "D" which is the decongestant. Instead, you can use Coricidin HBP for your cold and cough symptoms.     -  If you just have drainage you can take plain Zyrtec, Claritin or Allegra   -  If you just have a congested feeling you can take pseudoephedrine (unless you have high blood pressure) which you have to sign for behind the counter. Do not buy the phenylephrine which is on the shelf as it is not effective   -  Rest and fluids are also important.   -  Tylenol or ibuprofen can also be used as directed for pain unless you have an allergy to them or medical condition such as stomach ulcers, kidney or liver disease or blood thinners etc for which you should not be taking these type of medications.   -  If you are flying in the next few days Afrin nose drops for the airplane flight upon take off and landing may help. Other than at those times refrain from using afrin.   -  If you were prescribed a narcotic do not drive or operate heavy machinery while taking these medications.       "

## 2019-12-10 ENCOUNTER — OFFICE VISIT (OUTPATIENT)
Dept: ENDOCRINOLOGY | Facility: CLINIC | Age: 74
End: 2019-12-10
Payer: COMMERCIAL

## 2019-12-10 ENCOUNTER — LAB VISIT (OUTPATIENT)
Dept: LAB | Facility: HOSPITAL | Age: 74
End: 2019-12-10
Attending: INTERNAL MEDICINE
Payer: COMMERCIAL

## 2019-12-10 VITALS
DIASTOLIC BLOOD PRESSURE: 60 MMHG | SYSTOLIC BLOOD PRESSURE: 90 MMHG | WEIGHT: 141.56 LBS | HEART RATE: 70 BPM | BODY MASS INDEX: 24.17 KG/M2 | HEIGHT: 64 IN

## 2019-12-10 DIAGNOSIS — E03.9 ACQUIRED HYPOTHYROIDISM: Primary | ICD-10-CM

## 2019-12-10 DIAGNOSIS — Z78.0 MENOPAUSE: ICD-10-CM

## 2019-12-10 DIAGNOSIS — N95.9 MENOPAUSAL DISORDER: ICD-10-CM

## 2019-12-10 DIAGNOSIS — M85.80 OSTEOPENIA, UNSPECIFIED LOCATION: ICD-10-CM

## 2019-12-10 LAB
ALBUMIN SERPL BCP-MCNC: 3.5 G/DL (ref 3.5–5.2)
ALP SERPL-CCNC: 39 U/L (ref 55–135)
ALT SERPL W/O P-5'-P-CCNC: 12 U/L (ref 10–44)
ANION GAP SERPL CALC-SCNC: 5 MMOL/L (ref 8–16)
AST SERPL-CCNC: 16 U/L (ref 10–40)
BILIRUB SERPL-MCNC: 0.6 MG/DL (ref 0.1–1)
BUN SERPL-MCNC: 17 MG/DL (ref 8–23)
CALCIUM SERPL-MCNC: 9.5 MG/DL (ref 8.7–10.5)
CHLORIDE SERPL-SCNC: 105 MMOL/L (ref 95–110)
CO2 SERPL-SCNC: 29 MMOL/L (ref 23–29)
CREAT SERPL-MCNC: 0.8 MG/DL (ref 0.5–1.4)
EST. GFR  (AFRICAN AMERICAN): >60 ML/MIN/1.73 M^2
EST. GFR  (NON AFRICAN AMERICAN): >60 ML/MIN/1.73 M^2
GLUCOSE SERPL-MCNC: 92 MG/DL (ref 70–110)
POTASSIUM SERPL-SCNC: 4.8 MMOL/L (ref 3.5–5.1)
PROT SERPL-MCNC: 6.1 G/DL (ref 6–8.4)
SODIUM SERPL-SCNC: 139 MMOL/L (ref 136–145)

## 2019-12-10 PROCEDURE — 1126F AMNT PAIN NOTED NONE PRSNT: CPT | Mod: S$GLB,,, | Performed by: INTERNAL MEDICINE

## 2019-12-10 PROCEDURE — 1159F MED LIST DOCD IN RCRD: CPT | Mod: S$GLB,,, | Performed by: INTERNAL MEDICINE

## 2019-12-10 PROCEDURE — 80053 COMPREHEN METABOLIC PANEL: CPT

## 2019-12-10 PROCEDURE — 99204 OFFICE O/P NEW MOD 45 MIN: CPT | Mod: S$GLB,,, | Performed by: INTERNAL MEDICINE

## 2019-12-10 PROCEDURE — 1101F PR PT FALLS ASSESS DOC 0-1 FALLS W/OUT INJ PAST YR: ICD-10-PCS | Mod: CPTII,S$GLB,, | Performed by: INTERNAL MEDICINE

## 2019-12-10 PROCEDURE — 1159F PR MEDICATION LIST DOCUMENTED IN MEDICAL RECORD: ICD-10-PCS | Mod: S$GLB,,, | Performed by: INTERNAL MEDICINE

## 2019-12-10 PROCEDURE — 99999 PR PBB SHADOW E&M-EST. PATIENT-LVL III: ICD-10-PCS | Mod: PBBFAC,,, | Performed by: INTERNAL MEDICINE

## 2019-12-10 PROCEDURE — 99999 PR PBB SHADOW E&M-EST. PATIENT-LVL III: CPT | Mod: PBBFAC,,, | Performed by: INTERNAL MEDICINE

## 2019-12-10 PROCEDURE — 1126F PR PAIN SEVERITY QUANTIFIED, NO PAIN PRESENT: ICD-10-PCS | Mod: S$GLB,,, | Performed by: INTERNAL MEDICINE

## 2019-12-10 PROCEDURE — 99204 PR OFFICE/OUTPT VISIT, NEW, LEVL IV, 45-59 MIN: ICD-10-PCS | Mod: S$GLB,,, | Performed by: INTERNAL MEDICINE

## 2019-12-10 PROCEDURE — 36415 COLL VENOUS BLD VENIPUNCTURE: CPT

## 2019-12-10 PROCEDURE — 1101F PT FALLS ASSESS-DOCD LE1/YR: CPT | Mod: CPTII,S$GLB,, | Performed by: INTERNAL MEDICINE

## 2019-12-10 RX ORDER — VIT C/E/ZN/COPPR/LUTEIN/ZEAXAN 250MG-90MG
1000 CAPSULE ORAL DAILY
COMMUNITY

## 2019-12-10 RX ORDER — FEXOFENADINE HCL 60 MG
60 TABLET ORAL
COMMUNITY

## 2019-12-10 RX ORDER — CALCIUM CARBONATE 600 MG
1200 TABLET ORAL DAILY
COMMUNITY

## 2019-12-10 RX ORDER — ASPIRIN 81 MG/1
81 TABLET ORAL DAILY
COMMUNITY

## 2019-12-10 NOTE — PROGRESS NOTES
"Subjective:      Patient ID: Sury Gao is a 74 y.o. female.    Chief Complaint:  Hypothyroidism      History of Present Illness  With regards to her hypothyroidism:    Dx in her 20s     Current medication:  synthroid 75 mcg qd      Takes thyroid medication properly without food first thing in the morning       recently lost 15 lbs with much effort       Last BMD: 9/23/19   osteopenia   On ert       Has a ALEKSEY   Was given premarin     Had a TIA after and was started on estradiol - had no libido   Was start on estratest - was too much for her so the dose was reduced   She does not like how it makes her feel         Review of Systems   Constitutional: Negative for unexpected weight change.   Eyes: Negative for visual disturbance.   Respiratory: Negative for shortness of breath.    Cardiovascular: Negative for chest pain.   Gastrointestinal: Negative for abdominal pain.   Musculoskeletal: Negative for arthralgias.   Skin: Negative for wound.   Neurological: Negative for headaches.   Hematological: Does not bruise/bleed easily.   Psychiatric/Behavioral: Negative for sleep disturbance.       Objective:     BP 90/60   Pulse 70   Ht 5' 4" (1.626 m)   Wt 64.2 kg (141 lb 8.6 oz)   BMI 24.29 kg/m²   BP Readings from Last 3 Encounters:   12/10/19 90/60   11/23/19 98/68   09/17/19 108/60     Wt Readings from Last 1 Encounters:   12/10/19 1117 64.2 kg (141 lb 8.6 oz)     Body mass index is 24.29 kg/m².      Physical Exam   Constitutional: She appears well-developed.   HENT:   Right Ear: External ear normal.   Left Ear: External ear normal.   Nose: Nose normal.   Hearing normal  Dentition good    Neck: No tracheal deviation present. No thyromegaly present.   Cardiovascular: Normal rate.   No murmur heard.  Pulmonary/Chest: Effort normal and breath sounds normal.   Abdominal: Soft. There is no tenderness. No hernia.   Musculoskeletal: She exhibits no edema.   Gait normal  No clubbing or cyanosis noted   Neurological: " She displays normal reflexes. No cranial nerve deficit.   Skin: No rash noted.   No nodules       Psychiatric: She has a normal mood and affect. Judgment normal.   Vitals reviewed.             Lab Review:   Lab Results   Component Value Date    HGBA1C 5.2 09/17/2019     Lab Results   Component Value Date    CHOL 232 (H) 09/17/2019    HDL 60 09/17/2019    LDLCALC 154.6 09/17/2019    TRIG 87 09/17/2019    CHOLHDL 25.9 09/17/2019     Lab Results   Component Value Date     12/10/2019    K 4.8 12/10/2019     12/10/2019    CO2 29 12/10/2019    GLU 92 12/10/2019    BUN 17 12/10/2019    CREATININE 0.8 12/10/2019    CALCIUM 9.5 12/10/2019    PROT 6.1 12/10/2019    ALBUMIN 3.5 12/10/2019    BILITOT 0.6 12/10/2019    ALKPHOS 39 (L) 12/10/2019    AST 16 12/10/2019    ALT 12 12/10/2019    ANIONGAP 5 (L) 12/10/2019    ESTGFRAFRICA >60.0 12/10/2019    EGFRNONAA >60.0 12/10/2019    TSH 1.275 09/17/2019     Vit D, 25-Hydroxy   Date Value Ref Range Status   09/17/2019 36 30 - 96 ng/mL Final     Comment:     Vitamin D deficiency.........<10 ng/mL                              Vitamin D insufficiency......10-29 ng/mL       Vitamin D sufficiency........> or equal to 30 ng/mL  Vitamin D toxicity............>100 ng/mL         Assessment and Plan     Hypothyroidism  Clinically and biochemically euthyroid  Goal is a normal TSH  Avoid exogenous hyperthyroidism as this can accelerate bone loss and increase risk of CV complications.    Reviewed usual  times of thyroid hormone  Changes     reviewed that symptoms of hypothyroidism may not correlate with tsh, and a normal TSH is the goal of therapy.....  symptoms are not a justification for over treatment        Menopausal disorder  [facility]er to [facility]     Osteopenia    discussed implications  Reassuring not fracturing.   rda calcium and vit D  Follow over time  discussed indications for offering therapy        Of note her brother was dx with iron overload and she is  asking about this... She does not recall hemochromatosis as the dx - she will ask him and let her pcp know the dx       rtc prn

## 2019-12-10 NOTE — ASSESSMENT & PLAN NOTE
Clinically and biochemically euthyroid  Goal is a normal TSH  Avoid exogenous hyperthyroidism as this can accelerate bone loss and increase risk of CV complications.    Reviewed usual  times of thyroid hormone  Changes     reviewed that symptoms of hypothyroidism may not correlate with tsh, and a normal TSH is the goal of therapy.....  symptoms are not a justification for over treatment

## 2020-01-03 RX ORDER — ESTERIFIED ESTROGEN AND METHYLTESTOSTERONE .625; 1.25 MG/1; MG/1
1 TABLET ORAL EVERY OTHER DAY
Qty: 30 TABLET | Refills: 1 | Status: SHIPPED | OUTPATIENT
Start: 2020-01-03 | End: 2020-06-03

## 2020-01-13 ENCOUNTER — OFFICE VISIT (OUTPATIENT)
Dept: OBSTETRICS AND GYNECOLOGY | Facility: CLINIC | Age: 75
End: 2020-01-13
Attending: OBSTETRICS & GYNECOLOGY
Payer: COMMERCIAL

## 2020-01-13 ENCOUNTER — LAB VISIT (OUTPATIENT)
Dept: LAB | Facility: OTHER | Age: 75
End: 2020-01-13
Attending: OBSTETRICS & GYNECOLOGY
Payer: COMMERCIAL

## 2020-01-13 VITALS
SYSTOLIC BLOOD PRESSURE: 118 MMHG | WEIGHT: 148.25 LBS | BODY MASS INDEX: 25.31 KG/M2 | DIASTOLIC BLOOD PRESSURE: 64 MMHG | HEIGHT: 64 IN

## 2020-01-13 DIAGNOSIS — Z79.890 POSTMENOPAUSAL HORMONE REPLACEMENT THERAPY: ICD-10-CM

## 2020-01-13 DIAGNOSIS — Z79.890 POSTMENOPAUSAL HORMONE REPLACEMENT THERAPY: Primary | ICD-10-CM

## 2020-01-13 LAB — ESTRADIOL SERPL-MCNC: <10 PG/ML

## 2020-01-13 PROCEDURE — 99214 PR OFFICE/OUTPT VISIT, EST, LEVL IV, 30-39 MIN: ICD-10-PCS | Mod: S$GLB,,, | Performed by: OBSTETRICS & GYNECOLOGY

## 2020-01-13 PROCEDURE — 99499 RISK ADDL DX/OHS AUDIT: ICD-10-PCS | Mod: S$GLB,,, | Performed by: OBSTETRICS & GYNECOLOGY

## 2020-01-13 PROCEDURE — 84402 ASSAY OF FREE TESTOSTERONE: CPT

## 2020-01-13 PROCEDURE — 1101F PR PT FALLS ASSESS DOC 0-1 FALLS W/OUT INJ PAST YR: ICD-10-PCS | Mod: CPTII,S$GLB,, | Performed by: OBSTETRICS & GYNECOLOGY

## 2020-01-13 PROCEDURE — 1101F PT FALLS ASSESS-DOCD LE1/YR: CPT | Mod: CPTII,S$GLB,, | Performed by: OBSTETRICS & GYNECOLOGY

## 2020-01-13 PROCEDURE — 1159F MED LIST DOCD IN RCRD: CPT | Mod: S$GLB,,, | Performed by: OBSTETRICS & GYNECOLOGY

## 2020-01-13 PROCEDURE — 1159F PR MEDICATION LIST DOCUMENTED IN MEDICAL RECORD: ICD-10-PCS | Mod: S$GLB,,, | Performed by: OBSTETRICS & GYNECOLOGY

## 2020-01-13 PROCEDURE — 1126F AMNT PAIN NOTED NONE PRSNT: CPT | Mod: S$GLB,,, | Performed by: OBSTETRICS & GYNECOLOGY

## 2020-01-13 PROCEDURE — 99499 UNLISTED E&M SERVICE: CPT | Mod: S$GLB,,, | Performed by: OBSTETRICS & GYNECOLOGY

## 2020-01-13 PROCEDURE — 99214 OFFICE O/P EST MOD 30 MIN: CPT | Mod: S$GLB,,, | Performed by: OBSTETRICS & GYNECOLOGY

## 2020-01-13 PROCEDURE — 1126F PR PAIN SEVERITY QUANTIFIED, NO PAIN PRESENT: ICD-10-PCS | Mod: S$GLB,,, | Performed by: OBSTETRICS & GYNECOLOGY

## 2020-01-13 PROCEDURE — 82670 ASSAY OF TOTAL ESTRADIOL: CPT

## 2020-01-13 NOTE — PROGRESS NOTES
Subjective:       Patient ID: Sury Gao is a 74 y.o. female.    Chief Complaint:  hormone consult      History of Present Illness  HPI  The patient presents today for discussion /consultation of hormone replacement therapy. She is currently on Estratest HS (of which she takes 1/2 tab daily). She was never previously on hormones until about a year and a half ago. She was originally placed on Estrace 0.5 daily about 3 months after her hysterectomy due to mood swings, and  Had some improvement in her episodes of cluster headaches. . She has been menopausal for about 20 years.  Of note she has a long history of migraines with aura (had the aura usually only without the headache), but about 2 months subsequent to her hysterectomy , she developed a severe migraine headache and became aphasic at which point her spouse brought her to the ED at . She was diagnosed with a TIA at that time . She developed severe loss of libido subsequent to her hysterectomy, was switched to the Estratest HS from the Estrace but felt that it was too strong,caused too much libido  and she began to have increased facial and nasal hair and she went to one pill every other day with too many ups and downs of moods. She then changed to 1/2 tab daily and has been doing better. She wants to discuss optimum hormone management.    Last Mammogram:2019: Normal  Last lipid profile:: Hyperlipidemia.     GYN & OB History  No LMP recorded. Patient has had a hysterectomy.   Date of Last Pap: 3/23/2017    OB History    Para Term  AB Living   4 3 3   1 3   SAB TAB Ectopic Multiple Live Births   1       3      # Outcome Date GA Lbr Bertin/2nd Weight Sex Delivery Anes PTL Lv   4 SAB            3 Term            2 Term            1 Term                Past Medical History:   Diagnosis Date    Hypothyroidism     Nuclear sclerosis 2014    Thyroid disease     hypothyroid       Past Surgical History:   Procedure Laterality Date  "   Bilateral lower extremity varicose vein treatment       COLONOSCOPY N/A 2/23/2018    DILATION AND CURETTAGE OF UTERUS      HIP REPLACEMENT ARTHROPLASTY Left     HYSTERECTOMY  04/09/2018    TLHBSO with Sacrocolpopexy for prolapse    gutierrez neuroma      left foot    MINI-LAPAROTOMY      D&C perforation    OOPHORECTOMY  04/09/2018    TONSILLECTOMY      WRIST SURGERY      [facility]       Review of Systems  Review of Systems   Constitutional: Negative for activity change, appetite change, fatigue and unexpected weight change.   HENT: Negative.    Eyes: Negative for visual disturbance.   Respiratory: Negative for shortness of breath and wheezing.    Cardiovascular: Negative for chest pain, palpitations and leg swelling.   Gastrointestinal: Negative for abdominal pain, bloating and blood in stool.   Endocrine: Negative for diabetes, hair loss and hot flashes.   Genitourinary: Positive for decreased libido and vaginal dryness. Negative for dyspareunia and hot flashes.   Musculoskeletal: Negative for back pain and joint swelling.   Integumentary:  Negative for acne, hair changes and nipple discharge.   Neurological: Negative for headaches.   Hematological: Does not bruise/bleed easily.   Psychiatric/Behavioral: Negative for depression and sleep disturbance. The patient is not nervous/anxious.    Breast: Negative for mastodynia and nipple discharge          Objective:      /64   Ht 5' 4" (1.626 m)   Wt 67.3 kg (148 lb 4.2 oz)   BMI 25.45 kg/m²   Physical Exam         Assessment:        1. Postmenopausal hormone replacement therapy            Plan:      1. Postmenopausal hormone replacement therapy      Sury Gao was seen today for counseling and discussion on menopausal symptoms and hormone management.     Patient's multiple complaints include decreased libido.    The medical discussion explained that hormone deficiency is NOT an abnormal medical condition but rather " is usually a normal course of events in women's lives. I discussed with the patient the following changes to women's systems related to hormone deficiency: decreased libido, moodiness.    Additionally, the issues related to hormone deficiency caused by surgery were explained in detail. Conflicting information from popular publications was discussed in detail as well as information from medical papers, reports, and journals. All questions were answered in detail.    Discussed relative contraindications of recurrent migraines and ? History of TIA vs Migraine aphasia.   Will need to obtain records from Neurologist at .    Will schedule consult with Cardiology.    This patient does not have a family history of thrombo/embolic issues.     PE: Deferred    The different routes of hormone management (oral, transdermal, injectable, subcutaneous pellets) were explained. The benefits, risks, and side effects of each method were noted and discussed. Medical consequences of not receiving hormones were also explained, particularly concerning degenerative conditions (i.e. Osteoporosis)  and relating to preventive medicine. It was explained that individualized hormone management is the primary goal of this practice.    Discussed that if estrogen is to be used, that transdermal route may be a better option, although risks/benefits need to be weighed carefully.   Discussed that low dose Testosterone with supplementation at physiologic levels for premenopausal women has been noted as the treatment of choice for decreased libido in menopause with no apparent change in Lipid profile or in thrombotic risks.      DIAGNOSES:  1. Postmenopausal hormone replacement therapy          PLAN:   Orders Placed This Encounter    Estradiol    Testosterone, free    Ambulatory consult to Cardiology     Total time for this visit was 35 minute, more than half spent counseling the patient on the above hormone related issues.       - Estradiol;  Future  - Testosterone, free; Future  - Ambulatory consult to Cardiology     Obtain records from Neurology/admit for ?TIA 2 years ago.   Consider use of low dose Testosterone.     Follow up if symptoms worsen or fail to improve.

## 2020-01-13 NOTE — LETTER
January 13, 2020      Rox Ellis MD  [address]  [facility] [address]           [address]  [facility], [address]  [facility] [address]  Phone: [phone]  Fax: [phone]          Patient: Sury Gao   MR Number: [id number]   YOB: 1945   Date of Visit: 1/13/2020       Dear Dr. Rox Ellis:    Thank you for referring Sury Gao to me for evaluation. Attached you will find relevant portions of my assessment and plan of care.    If you have questions, please do not hesitate to call me. I look forward to following Sury Gao along with you.    Sincerely,    Erica Cabrera MD    Enclosure  CC:  No Recipients    If you would like to receive this communication electronically, please contact [email or web address] or [phone] to request more information on [employer] Link access.    For providers and/or their staff who would like to refer a patient to [facility], please contact us through our one-stop-shop provider referral line, [facility], at [phone].    If you feel you have received this communication in error or would no longer like to receive these types of communications, please e-mail [email or web address]

## 2020-01-13 NOTE — Clinical Note
Please schedule for Consult for evaluation for hormone replacement / hx of ?TIA Migraine with aphasic episode about 2 months after TLHBSO. (long hx of migraines in the past).

## 2020-01-15 ENCOUNTER — TELEPHONE (OUTPATIENT)
Dept: CARDIOLOGY | Facility: CLINIC | Age: 75
End: 2020-01-15

## 2020-01-15 DIAGNOSIS — R00.2 PALPITATIONS: Primary | ICD-10-CM

## 2020-01-16 LAB — TESTOST FREE SERPL-MCNC: 0.3 PG/ML

## 2020-01-21 ENCOUNTER — HOSPITAL ENCOUNTER (OUTPATIENT)
Dept: CARDIOLOGY | Facility: CLINIC | Age: 75
Discharge: HOME | End: 2020-01-21
Payer: COMMERCIAL

## 2020-01-21 DIAGNOSIS — R00.2 PALPITATIONS: ICD-10-CM

## 2020-01-21 PROCEDURE — 93005 ELECTROCARDIOGRAM TRACING: CPT | Mod: S$GLB,,, | Performed by: INTERNAL MEDICINE

## 2020-01-21 PROCEDURE — 93005 EKG 12-LEAD: ICD-10-PCS | Mod: S$GLB,,, | Performed by: INTERNAL MEDICINE

## 2020-01-21 PROCEDURE — 93010 ELECTROCARDIOGRAM REPORT: CPT | Mod: S$GLB,,, | Performed by: INTERNAL MEDICINE

## 2020-01-21 PROCEDURE — 93010 EKG 12-LEAD: ICD-10-PCS | Mod: S$GLB,,, | Performed by: INTERNAL MEDICINE

## 2020-02-05 ENCOUNTER — DOCUMENTATION ONLY (OUTPATIENT)
Dept: OBSTETRICS AND GYNECOLOGY | Facility: CLINIC | Age: 75
End: 2020-02-05

## 2020-02-05 NOTE — PROGRESS NOTES
Received outpatient Neurology records from 2018 from [facility], with final diagnosis of Classic Migraine, no evidence of stroke .will have records scanned in.

## 2020-02-17 ENCOUNTER — OFFICE VISIT (OUTPATIENT)
Dept: CARDIOLOGY | Facility: CLINIC | Age: 75
End: 2020-02-17
Payer: COMMERCIAL

## 2020-02-17 VITALS
BODY MASS INDEX: 25.41 KG/M2 | SYSTOLIC BLOOD PRESSURE: 114 MMHG | DIASTOLIC BLOOD PRESSURE: 56 MMHG | HEIGHT: 64 IN | OXYGEN SATURATION: 95 % | WEIGHT: 148.81 LBS | HEART RATE: 61 BPM

## 2020-02-17 DIAGNOSIS — G45.9 TIA (TRANSIENT ISCHEMIC ATTACK): ICD-10-CM

## 2020-02-17 DIAGNOSIS — E78.00 HYPERCHOLESTEREMIA: ICD-10-CM

## 2020-02-17 DIAGNOSIS — N95.9 MENOPAUSAL DISORDER: Primary | ICD-10-CM

## 2020-02-17 DIAGNOSIS — Z91.89 AT RISK FOR CORONARY ARTERY DISEASE: ICD-10-CM

## 2020-02-17 PROCEDURE — 99999 PR PBB SHADOW E&M-EST. PATIENT-LVL III: CPT | Mod: PBBFAC,,, | Performed by: INTERNAL MEDICINE

## 2020-02-17 PROCEDURE — 99999 PR PBB SHADOW E&M-EST. PATIENT-LVL III: ICD-10-PCS | Mod: PBBFAC,,, | Performed by: INTERNAL MEDICINE

## 2020-02-17 PROCEDURE — 99204 OFFICE O/P NEW MOD 45 MIN: CPT | Mod: S$GLB,,, | Performed by: INTERNAL MEDICINE

## 2020-02-17 PROCEDURE — 1101F PR PT FALLS ASSESS DOC 0-1 FALLS W/OUT INJ PAST YR: ICD-10-PCS | Mod: CPTII,S$GLB,, | Performed by: INTERNAL MEDICINE

## 2020-02-17 PROCEDURE — 1126F PR PAIN SEVERITY QUANTIFIED, NO PAIN PRESENT: ICD-10-PCS | Mod: S$GLB,,, | Performed by: INTERNAL MEDICINE

## 2020-02-17 PROCEDURE — 1159F MED LIST DOCD IN RCRD: CPT | Mod: S$GLB,,, | Performed by: INTERNAL MEDICINE

## 2020-02-17 PROCEDURE — 1126F AMNT PAIN NOTED NONE PRSNT: CPT | Mod: S$GLB,,, | Performed by: INTERNAL MEDICINE

## 2020-02-17 PROCEDURE — 99204 PR OFFICE/OUTPT VISIT, NEW, LEVL IV, 45-59 MIN: ICD-10-PCS | Mod: S$GLB,,, | Performed by: INTERNAL MEDICINE

## 2020-02-17 PROCEDURE — 1101F PT FALLS ASSESS-DOCD LE1/YR: CPT | Mod: CPTII,S$GLB,, | Performed by: INTERNAL MEDICINE

## 2020-02-17 PROCEDURE — 1159F PR MEDICATION LIST DOCUMENTED IN MEDICAL RECORD: ICD-10-PCS | Mod: S$GLB,,, | Performed by: INTERNAL MEDICINE

## 2020-02-17 RX ORDER — ROSUVASTATIN CALCIUM 5 MG/1
5 TABLET, COATED ORAL DAILY
Qty: 90 TABLET | Refills: 3 | Status: SHIPPED | OUTPATIENT
Start: 2020-02-17 | End: 2020-05-21

## 2020-02-17 NOTE — PROGRESS NOTES
"Subjective:   Patient ID:  Sury Gao is a 74 y.o. female is a new patient who presents for evaluation of Postmenopausal hormone replacement therapy    Clearance for HRT  HPI:   2 months post hysterectomy she felt transiently aphasic minutes. By the time she was in there ER she was ok. Her CT head is negative.   No chest pain, Orthopnea, PND of heart failure symptoms.   Ex smoker quit 42 yrs ago  Smoked 5 years (1/2 pack a day)  No f/h of heart disease.     The 10-year ASCVD risk score (Julianeellen QUILES Jr., et al., 2013) is: 10.7%    Values used to calculate the score:      Age: 74 years      Sex: Female      Is Non- : No      Diabetic: No      Tobacco smoker: No      Systolic Blood Pressure: 114 mmHg      Is BP treated: No      HDL Cholesterol: 60 mg/dL      Total Cholesterol: 232 mg/dL      Patient Active Problem List   Diagnosis    Nuclear sclerosis    Hypothyroidism    Menopausal disorder    Osteopenia     BP (!) 114/56 (BP Location: Left arm, Patient Position: Sitting, BP Method: Large (Automatic))   Pulse 61   Ht 5' 4" (1.626 m)   Wt 67.5 kg (148 lb 13 oz)   SpO2 95%   BMI 25.54 kg/m²   Body mass index is 25.54 kg/m².  CrCl cannot be calculated (Patient's most recent lab result is older than the maximum 7 days allowed.).    Lab Results   Component Value Date     12/10/2019    K 4.8 12/10/2019     12/10/2019    CO2 29 12/10/2019    BUN 17 12/10/2019    CREATININE 0.8 12/10/2019    GLU 92 12/10/2019    HGBA1C 5.2 09/17/2019    AST 16 12/10/2019    ALT 12 12/10/2019    ALBUMIN 3.5 12/10/2019    PROT 6.1 12/10/2019    BILITOT 0.6 12/10/2019    WBC 6.78 09/17/2019    HGB 15.5 09/17/2019    HCT 46.4 09/17/2019    MCV 93 09/17/2019     09/17/2019    TSH 1.275 09/17/2019    CHOL 232 (H) 09/17/2019    HDL 60 09/17/2019    LDLCALC 154.6 09/17/2019    TRIG 87 09/17/2019       Current Outpatient Medications   Medication Sig    aspirin (ECOTRIN) 81 MG EC tablet Take " 81 mg by mouth once daily.    calcium carbonate (OS-SUHAIL) 600 mg calcium (1,500 mg) Tab Take 1,200 mg by mouth once daily.    cholecalciferol, vitamin D3, (VITAMIN D3) 25 mcg (1,000 unit) capsule Take 1,000 Units by mouth once daily.    estrogens,conjugated,-methyltestosterone 0.625-1.25mg (ESTRATEST HS) 0.625-1.25 mg per tablet Take 1 tablet by mouth every other day.    fexofenadine (ALLEGRA) 60 MG tablet Take 60 mg by mouth as needed.    loratadine (CLARITIN) 10 mg tablet Take 10 mg by mouth daily as needed.    multivit,iron,minerals/lutein (CENTRUM SILVER ULTRA WOMEN'S ORAL) Take 1 tablet by mouth once daily.    SYNTHROID 75 mcg tablet TAKE 1 TABLET BY MOUTH DAILY    UNABLE TO FIND Hair, Skin and nails    vitC/E/Zn/copper/lutein/zeaxan (ICAPS AREDS2 ORAL) Take 2 capsules by mouth once daily.    furosemide (LASIX) 40 MG tablet Take 1 tablet (40 mg total) by mouth daily as needed (swelling).    rosuvastatin (CRESTOR) 5 MG tablet Take 1 tablet (5 mg total) by mouth once daily.     No current facility-administered medications for this visit.        Review of Systems   Constitution: Negative for chills, decreased appetite, malaise/fatigue, night sweats, weight gain and weight loss.   Eyes: Negative for blurred vision, double vision, visual disturbance and visual halos.   Cardiovascular: Negative for chest pain, claudication, cyanosis, dyspnea on exertion, irregular heartbeat, leg swelling, near-syncope, orthopnea, palpitations, paroxysmal nocturnal dyspnea and syncope.   Respiratory: Negative for cough, hemoptysis, snoring, sputum production and wheezing.    Endocrine: Negative for cold intolerance, heat intolerance, polydipsia and polyphagia.   Hematologic/Lymphatic: Negative for adenopathy and bleeding problem. Does not bruise/bleed easily.   Skin: Negative for flushing, itching, poor wound healing and rash.   Musculoskeletal: Negative for arthritis, back pain, falls, gout, joint pain, joint swelling,  muscle cramps, muscle weakness, myalgias, neck pain and stiffness.   Gastrointestinal: Negative for bloating, abdominal pain, anorexia, diarrhea, dysphagia, excessive appetite, flatus, hematemesis, jaundice, melena and nausea.   Genitourinary: Negative for hesitancy and incomplete emptying.   Neurological: Negative for aphonia, brief paralysis, difficulty with concentration, disturbances in coordination, excessive daytime sleepiness, dizziness, focal weakness, light-headedness, loss of balance and weakness.   Psychiatric/Behavioral: Negative for altered mental status, depression, hallucinations, hypervigilance, memory loss, substance abuse and suicidal ideas. The patient does not have insomnia and is not nervous/anxious.        Objective:   Physical Exam   Constitutional: She is oriented to person, place, and time. She appears well-developed and well-nourished. No distress.   HENT:   Head: Normocephalic and atraumatic.   Nose: Nose normal.   Mouth/Throat: Oropharynx is clear and moist. No oropharyngeal exudate.   Eyes: Pupils are equal, round, and reactive to light. Conjunctivae and EOM are normal. Right eye exhibits no discharge. Left eye exhibits no discharge. No scleral icterus.   Neck: Normal range of motion. Neck supple. No JVD present. No tracheal deviation present. No thyromegaly present.   Cardiovascular: Normal rate, regular rhythm, normal heart sounds and intact distal pulses. Exam reveals no gallop and no friction rub.   No murmur heard.  Pulmonary/Chest: Effort normal and breath sounds normal. No stridor. No respiratory distress. She has no wheezes. She has no rales. She exhibits no tenderness.   Abdominal: Soft. Bowel sounds are normal. She exhibits no distension and no mass. There is no tenderness. There is no rebound and no guarding.   Musculoskeletal: Normal range of motion. She exhibits no edema or tenderness.   Lymphadenopathy:     She has no cervical adenopathy.   Neurological: She is alert and  oriented to person, place, and time. She has normal reflexes. No cranial nerve deficit. She exhibits normal muscle tone. Coordination normal.   Skin: Skin is warm. No rash noted. She is not diaphoretic. No erythema. No pallor.   Psychiatric: She has a normal mood and affect. Her behavior is normal. Judgment and thought content normal.       Assessment:     1. Menopausal disorder    2. At risk for coronary artery disease    3. TIA (transient ischemic attack)    4. Hypercholesteremia        Plan:   questional h/o TIA given that she does not have significant risk factors it does not appear that she this is a real diagnosis, lets evaluate her with calcium score and will decide on HRT clearance based on that.     Started statin given her risk.    Sury was seen today for postmenopausal hormone replacement therapy.    Diagnoses and all orders for this visit:    Menopausal disorder    At risk for coronary artery disease  -     CT Calcium Scoring Cardiac; Future  -     IN OFFICE EKG 12-LEAD (to Muse)    TIA (transient ischemic attack)  -     IN OFFICE EKG 12-LEAD (to Muse)    Hypercholesteremia  -     Hepatic function panel; Future  -     Lipid panel; Future    Other orders  -     rosuvastatin (CRESTOR) 5 MG tablet; Take 1 tablet (5 mg total) by mouth once daily.      RTC prn/.

## 2020-02-17 NOTE — LETTER
February 17, 2020      Erica Cabrera MD  [address]  [address]  [facility] [address]           [facility] - Cardiology  [address]  [place] [address]  Phone: [phone]          Patient: Sury Gao   MR Number: [id number]   YOB: 1945   Date of Visit: 2/17/2020       Dear Dr. Erica Cabrera:    Thank you for referring Sury Gao to me for evaluation. Attached you will find relevant portions of my assessment and plan of care.    If you have questions, please do not hesitate to call me. I look forward to following Sury Gao along with you.    Sincerely,    Renate Alejandre MD    Enclosure  CC:  No Recipients    If you would like to receive this communication electronically, please contact [email or web address] or [phone] to request more information on [employer] Link access.    For providers and/or their staff who would like to refer a patient to [facility], please contact us through our one-stop-shop provider referral line, [facility] , at [phone].    If you feel you have received this communication in error or would no longer like to receive these types of communications, please e-mail [email or web address]

## 2020-02-24 ENCOUNTER — HOSPITAL ENCOUNTER (OUTPATIENT)
Dept: RADIOLOGY | Facility: HOSPITAL | Age: 75
Discharge: HOME | End: 2020-02-24
Attending: INTERNAL MEDICINE
Payer: COMMERCIAL

## 2020-02-24 DIAGNOSIS — Z91.89 AT RISK FOR CORONARY ARTERY DISEASE: ICD-10-CM

## 2020-02-24 PROCEDURE — 75571 CT HRT W/O DYE W/CA TEST: CPT | Mod: 26,,, | Performed by: RADIOLOGY

## 2020-02-24 PROCEDURE — 75571 CT HRT W/O DYE W/CA TEST: CPT | Mod: TC

## 2020-02-24 PROCEDURE — 75571 CT CALCIUM SCORING CARDIAC: ICD-10-PCS | Mod: 26,,, | Performed by: RADIOLOGY

## 2020-03-10 ENCOUNTER — TELEPHONE (OUTPATIENT)
Dept: CARDIOLOGY | Facility: CLINIC | Age: 75
End: 2020-03-10

## 2020-03-10 NOTE — PROGRESS NOTES
Your calcium score is 0.risk of heart disease is very low. No ASPIRIN or cholesterol lowering medication is recommended at this time.

## 2020-05-14 DIAGNOSIS — Z77.21 EXPOSURE TO BLOOD OR BODY FLUID: Primary | ICD-10-CM

## 2020-05-14 DIAGNOSIS — E78.5 HYPERLIPIDEMIA, UNSPECIFIED HYPERLIPIDEMIA TYPE: ICD-10-CM

## 2020-05-18 ENCOUNTER — LAB VISIT (OUTPATIENT)
Dept: LAB | Facility: HOSPITAL | Age: 75
End: 2020-05-18
Attending: INTERNAL MEDICINE
Payer: MEDICARE

## 2020-05-18 DIAGNOSIS — E78.00 HYPERCHOLESTEREMIA: ICD-10-CM

## 2020-05-18 DIAGNOSIS — E78.5 HYPERLIPIDEMIA, UNSPECIFIED HYPERLIPIDEMIA TYPE: ICD-10-CM

## 2020-05-18 DIAGNOSIS — Z77.21 EXPOSURE TO BLOOD OR BODY FLUID: ICD-10-CM

## 2020-05-18 LAB
ALBUMIN SERPL BCP-MCNC: 3.2 G/DL (ref 3.5–5.2)
ALP SERPL-CCNC: 36 U/L (ref 55–135)
ALT SERPL W/O P-5'-P-CCNC: 20 U/L (ref 10–44)
ANION GAP SERPL CALC-SCNC: 5 MMOL/L (ref 8–16)
AST SERPL-CCNC: 22 U/L (ref 10–40)
BASOPHILS # BLD AUTO: 0.05 K/UL (ref 0–0.2)
BASOPHILS NFR BLD: 0.8 % (ref 0–1.9)
BILIRUB DIRECT SERPL-MCNC: 0.3 MG/DL (ref 0.1–0.3)
BILIRUB DIRECT SERPL-MCNC: 0.3 MG/DL (ref 0.1–0.3)
BILIRUB SERPL-MCNC: 0.5 MG/DL (ref 0.1–1)
BUN SERPL-MCNC: 14 MG/DL (ref 8–23)
CALCIUM SERPL-MCNC: 8.6 MG/DL (ref 8.7–10.5)
CHLORIDE SERPL-SCNC: 106 MMOL/L (ref 95–110)
CHOLEST SERPL-MCNC: 159 MG/DL (ref 120–199)
CHOLEST/HDLC SERPL: 2.5 {RATIO} (ref 2–5)
CO2 SERPL-SCNC: 30 MMOL/L (ref 23–29)
CREAT SERPL-MCNC: 0.8 MG/DL (ref 0.5–1.4)
DIFFERENTIAL METHOD: ABNORMAL
EOSINOPHIL # BLD AUTO: 0.1 K/UL (ref 0–0.5)
EOSINOPHIL NFR BLD: 1.7 % (ref 0–8)
ERYTHROCYTE [DISTWIDTH] IN BLOOD BY AUTOMATED COUNT: 12.6 % (ref 11.5–14.5)
EST. GFR  (AFRICAN AMERICAN): >60 ML/MIN/1.73 M^2
EST. GFR  (NON AFRICAN AMERICAN): >60 ML/MIN/1.73 M^2
GLUCOSE SERPL-MCNC: 94 MG/DL (ref 70–110)
HCT VFR BLD AUTO: 46.1 % (ref 37–48.5)
HDLC SERPL-MCNC: 64 MG/DL (ref 40–75)
HDLC SERPL: 40.3 % (ref 20–50)
HGB BLD-MCNC: 14.6 G/DL (ref 12–16)
IMM GRANULOCYTES # BLD AUTO: 0.02 K/UL (ref 0–0.04)
IMM GRANULOCYTES NFR BLD AUTO: 0.3 % (ref 0–0.5)
LDLC SERPL CALC-MCNC: 84.8 MG/DL (ref 63–159)
LYMPHOCYTES # BLD AUTO: 2.2 K/UL (ref 1–4.8)
LYMPHOCYTES NFR BLD: 36.8 % (ref 18–48)
MCH RBC QN AUTO: 31.5 PG (ref 27–31)
MCHC RBC AUTO-ENTMCNC: 31.7 G/DL (ref 32–36)
MCV RBC AUTO: 99 FL (ref 82–98)
MONOCYTES # BLD AUTO: 0.5 K/UL (ref 0.3–1)
MONOCYTES NFR BLD: 8.4 % (ref 4–15)
NEUTROPHILS # BLD AUTO: 3.1 K/UL (ref 1.8–7.7)
NEUTROPHILS NFR BLD: 52 % (ref 38–73)
NONHDLC SERPL-MCNC: 95 MG/DL
NRBC BLD-RTO: 0 /100 WBC
PLATELET # BLD AUTO: 236 K/UL (ref 150–350)
PMV BLD AUTO: 11.1 FL (ref 9.2–12.9)
POTASSIUM SERPL-SCNC: 4.1 MMOL/L (ref 3.5–5.1)
PROT SERPL-MCNC: 5.7 G/DL (ref 6–8.4)
RBC # BLD AUTO: 4.64 M/UL (ref 4–5.4)
SARS-COV-2 IGG SERPLBLD QL IA.RAPID: NEGATIVE
SODIUM SERPL-SCNC: 141 MMOL/L (ref 136–145)
TRIGL SERPL-MCNC: 51 MG/DL (ref 30–150)
WBC # BLD AUTO: 6.04 K/UL (ref 3.9–12.7)

## 2020-05-18 PROCEDURE — 80053 COMPREHEN METABOLIC PANEL: CPT

## 2020-05-18 PROCEDURE — 80076 HEPATIC FUNCTION PANEL: CPT

## 2020-05-18 PROCEDURE — 80061 LIPID PANEL: CPT

## 2020-05-18 PROCEDURE — 85025 COMPLETE CBC W/AUTO DIFF WBC: CPT

## 2020-05-18 PROCEDURE — 86769 SARS-COV-2 COVID-19 ANTIBODY: CPT

## 2020-05-18 PROCEDURE — 36415 COLL VENOUS BLD VENIPUNCTURE: CPT

## 2020-05-21 ENCOUNTER — TELEPHONE (OUTPATIENT)
Dept: CARDIOLOGY | Facility: CLINIC | Age: 75
End: 2020-05-21

## 2020-05-21 DIAGNOSIS — E78.00 HYPERCHOLESTEREMIA: Primary | ICD-10-CM

## 2020-05-21 RX ORDER — ROSUVASTATIN CALCIUM 10 MG/1
10 TABLET, COATED ORAL NIGHTLY
Qty: 90 TABLET | Refills: 3 | Status: SHIPPED | OUTPATIENT
Start: 2020-05-21 | End: 2021-06-14

## 2020-06-03 RX ORDER — ESTERIFIED ESTROGEN AND METHYLTESTOSTERONE .625; 1.25 MG/1; MG/1
1 TABLET ORAL EVERY OTHER DAY
Qty: 30 TABLET | Refills: 1 | Status: SHIPPED | OUTPATIENT
Start: 2020-06-03 | End: 2020-06-24

## 2020-06-24 ENCOUNTER — OFFICE VISIT (OUTPATIENT)
Dept: OBSTETRICS AND GYNECOLOGY | Facility: CLINIC | Age: 75
End: 2020-06-24
Attending: OBSTETRICS & GYNECOLOGY
Payer: COMMERCIAL

## 2020-06-24 VITALS
SYSTOLIC BLOOD PRESSURE: 112 MMHG | WEIGHT: 147.06 LBS | HEIGHT: 64 IN | DIASTOLIC BLOOD PRESSURE: 62 MMHG | BODY MASS INDEX: 25.11 KG/M2

## 2020-06-24 DIAGNOSIS — Z12.39 BREAST CANCER SCREENING: ICD-10-CM

## 2020-06-24 DIAGNOSIS — M85.80 OSTEOPENIA, UNSPECIFIED LOCATION: ICD-10-CM

## 2020-06-24 DIAGNOSIS — Z01.419 WELL FEMALE EXAM WITH ROUTINE GYNECOLOGICAL EXAM: Primary | ICD-10-CM

## 2020-06-24 PROCEDURE — 99397 PER PM REEVAL EST PAT 65+ YR: CPT | Mod: S$GLB,,, | Performed by: OBSTETRICS & GYNECOLOGY

## 2020-06-24 PROCEDURE — 99397 PR PREVENTIVE VISIT,EST,65 & OVER: ICD-10-PCS | Mod: S$GLB,,, | Performed by: OBSTETRICS & GYNECOLOGY

## 2020-06-24 PROCEDURE — 99999 PR PBB SHADOW E&M-EST. PATIENT-LVL IV: CPT | Mod: PBBFAC,,, | Performed by: OBSTETRICS & GYNECOLOGY

## 2020-06-24 PROCEDURE — 99999 PR PBB SHADOW E&M-EST. PATIENT-LVL IV: ICD-10-PCS | Mod: PBBFAC,,, | Performed by: OBSTETRICS & GYNECOLOGY

## 2020-06-24 RX ORDER — ESTRADIOL 0.5 MG/1
0.5 TABLET ORAL DAILY
Qty: 90 TABLET | Refills: 3 | Status: SHIPPED | OUTPATIENT
Start: 2020-06-24 | End: 2021-06-11

## 2020-06-25 ENCOUNTER — HOSPITAL ENCOUNTER (OUTPATIENT)
Dept: RADIOLOGY | Facility: HOSPITAL | Age: 75
Discharge: HOME | End: 2020-06-25
Attending: OBSTETRICS & GYNECOLOGY
Payer: COMMERCIAL

## 2020-06-25 DIAGNOSIS — Z12.39 BREAST CANCER SCREENING: ICD-10-CM

## 2020-06-25 PROCEDURE — 77063 MAMMO DIGITAL SCREENING BILAT WITH TOMOSYNTHESIS_CAD: ICD-10-PCS | Mod: 26,,, | Performed by: RADIOLOGY

## 2020-06-25 PROCEDURE — 77067 MAMMO DIGITAL SCREENING BILAT WITH TOMOSYNTHESIS_CAD: ICD-10-PCS | Mod: 26,,, | Performed by: RADIOLOGY

## 2020-06-25 PROCEDURE — 77063 BREAST TOMOSYNTHESIS BI: CPT | Mod: 26,,, | Performed by: RADIOLOGY

## 2020-06-25 PROCEDURE — 77067 SCR MAMMO BI INCL CAD: CPT | Mod: TC,PO

## 2020-06-25 PROCEDURE — 77067 SCR MAMMO BI INCL CAD: CPT | Mod: 26,,, | Performed by: RADIOLOGY

## 2020-06-25 NOTE — PROGRESS NOTES
SUBJECTIVE:   74 y.o. female   for routine gyn exam. No LMP recorded. Patient has had a hysterectomy..  She desires estradiol- low dose.  Did not like SE of Estratest.         Past Medical History:   Diagnosis Date    Hypothyroidism     Nuclear sclerosis 2014    Thyroid disease     hypothyroid     Past Surgical History:   Procedure Laterality Date    Bilateral lower extremity varicose vein treatment       COLONOSCOPY N/A 2018    DILATION AND CURETTAGE OF UTERUS      HIP REPLACEMENT ARTHROPLASTY Left     HYSTERECTOMY  2018    TLHBSO with Sacrocolpopexy for prolapse    gutierrez neuroma      left foot    MINI-LAPAROTOMY      D&C perforation    OOPHORECTOMY  2018    TONSILLECTOMY      WRIST SURGERY      left wrist: [facility]     Social History     Socioeconomic History    Marital status:      Spouse name: Not on file    Number of children: Not on file    Years of education: Not on file    Highest education level: Not on file   Occupational History    Not on file   Social Needs    Financial resource strain: Not on file    Food insecurity     Worry: Not on file     Inability: Not on file    Transportation needs     Medical: Not on file     Non-medical: Not on file   Tobacco Use    Smoking status: Former Smoker     Packs/day: 0.25     Years: 10.00     Pack years: 2.50     Quit date: 6/10/1976     Years since quittin.0    Smokeless tobacco: Former User   Substance and Sexual Activity    Alcohol use: Yes     Comment: social    Drug use: No    Sexual activity: Yes   Lifestyle    Physical activity     Days per week: Not on file     Minutes per session: Not on file    Stress: Not on file   Relationships    Social connections     Talks on phone: Not on file     Gets together: Not on file     Attends Muslim service: Not on file     Active member of club or organization: Not on file     Attends meetings of clubs or organizations: Not on file      Relationship status: Not on file   Other Topics Concern    Not on file   Social History Narrative        Boyfriend is physician    Exercise limited by hip pain from OA     Family History   Problem Relation Age of Onset    Hypothyroidism Mother     Cataracts Mother     Heart disease Mother         Valvular heart disease    Cataracts Father     Macular degeneration Father     Hypothyroidism Sister     Hypothyroidism Daughter     Cancer Brother         testicular cancer    Amblyopia Neg Hx     Blindness Neg Hx     Glaucoma Neg Hx     Retinal detachment Neg Hx     Strabismus Neg Hx     Breast cancer Neg Hx     Colon cancer Neg Hx     Diabetes Neg Hx     Hypertension Neg Hx     Ovarian cancer Neg Hx     Cervical cancer Neg Hx     Endometrial cancer Neg Hx     Vaginal cancer Neg Hx     Heart attack Neg Hx     Hyperlipidemia Neg Hx     Stroke Neg Hx     Heart failure Neg Hx      OB History    Para Term  AB Living   4 3 3   1 3   SAB TAB Ectopic Multiple Live Births   1       3      # Outcome Date GA Lbr Bertin/2nd Weight Sex Delivery Anes PTL Lv   4 SAB            3 Term            2 Term            1 Term                  Current Outpatient Medications   Medication Sig Dispense Refill    aspirin (ECOTRIN) 81 MG EC tablet Take 81 mg by mouth once daily.      calcium carbonate (OS-SUHAIL) 600 mg calcium (1,500 mg) Tab Take 1,200 mg by mouth once daily.      cholecalciferol, vitamin D3, (VITAMIN D3) 25 mcg (1,000 unit) capsule Take 1,000 Units by mouth once daily.      fexofenadine (ALLEGRA) 60 MG tablet Take 60 mg by mouth as needed.      loratadine (CLARITIN) 10 mg tablet Take 10 mg by mouth daily as needed.      multivit,iron,minerals/lutein (CENTRUM SILVER ULTRA WOMEN'S ORAL) Take 1 tablet by mouth once daily.      rosuvastatin (CRESTOR) 10 MG tablet Take 1 tablet (10 mg total) by mouth every evening. 90 tablet 3    SYNTHROID 75 mcg tablet TAKE 1 TABLET BY MOUTH DAILY 90  "tablet 3    UNABLE TO FIND Hair, Skin and nails      vitC/E/Zn/copper/lutein/zeaxan (ICAPS AREDS2 ORAL) Take 2 capsules by mouth once daily.      estradioL (ESTRACE) 0.5 MG tablet Take 1 tablet (0.5 mg total) by mouth once daily. 90 tablet 3    furosemide (LASIX) 40 MG tablet Take 1 tablet (40 mg total) by mouth daily as needed (swelling). 90 tablet 2     No current facility-administered medications for this visit.      Allergies: Patient has no known allergies.     ROS:  Constitutional: no weight loss, weight gain, fever, fatigue  Eyes:  No vision changes, glasses/contacts  ENT/Mouth: No ulcers, sinus problems, ears ringing, headache  Cardiovascular: No inability to lie flat, chest pain, exercise intolerance, swelling, heart palpitations  Respiratory: No wheezing, coughing blood, shortness of breath, or cough  Gastrointestinal: No diarrhea, bloody stool, nausea/vomiting, constipation, gas, hemorrhoids  Genitourinary: No blood in urine, painful urination, urgency of urination, frequency of urination, incomplete emptying, incontinence, abnormal bleeding, painful periods, heavy periods, vaginal discharge, vaginal odor, painful intercourse, sexual problems, bleeding after intercourse.  Musculoskeletal: No muscle weakness  Skin/Breast: No painful breasts, nipple discharge, masses, rash, ulcers  Neurological: No passing out, seizures, numbness, headache  Endocrine: No diabetes, hypothyroid, hyperthyroid, hot flashes, hair loss, abnormal hair growth, acne  Psychiatric: No depression, crying  Hematologic: No bruises, bleeding, swollen lymph nodes, anemia.      OBJECTIVE:   The patient appears well, alert, oriented x 3, in no distress.  /62   Ht 5' 4" (1.626 m)   Wt 66.7 kg (147 lb 0.8 oz)   BMI 25.24 kg/m²   NECK: no thyromegaly, trachea midline  SKIN: no acne, striae, hirsutism  CHEST: CTAB  CV: RRR  BREAST EXAM: breasts appear normal, no suspicious masses, no skin or nipple changes or axillary " nodes  ABDOMEN: no hernias, masses, or hepatosplenomegaly  GENITALIA: normal external genitalia, no erythema, no discharge  URETHRA: normal urethra, normal urethral meatus  VAGINA: Normal  CERVIX: absent  UTERUS: absent  ADNEXA: no mass, fullness, tenderness      ASSESSMENT:   1. Well female exam with routine gynecological exam     2. Osteopenia, unspecified location     3. Breast cancer screening  Mammo Digital Screening Bilat w/ Asim       PLAN:   Orders Placed This Encounter    Mammo Digital Screening Bilat w/ Asim    estradioL (ESTRACE) 0.5 MG tablet     Discussed ERT, WHI, healthy lifestyle including regular exercise, healthy eating, etc.  Return to clinic in 1 year

## 2020-06-26 ENCOUNTER — OFFICE VISIT (OUTPATIENT)
Dept: UROGYNECOLOGY | Facility: CLINIC | Age: 75
End: 2020-06-26
Payer: MEDICARE

## 2020-06-26 VITALS
BODY MASS INDEX: 25.47 KG/M2 | SYSTOLIC BLOOD PRESSURE: 100 MMHG | DIASTOLIC BLOOD PRESSURE: 72 MMHG | WEIGHT: 148.38 LBS

## 2020-06-26 DIAGNOSIS — N39.46 MIXED INCONTINENCE: Primary | ICD-10-CM

## 2020-06-26 DIAGNOSIS — Z01.818 PRE-OP EVALUATION: ICD-10-CM

## 2020-06-26 DIAGNOSIS — N39.46 MIXED INCONTINENCE URGE AND STRESS: ICD-10-CM

## 2020-06-26 DIAGNOSIS — N39.41 URGENCY INCONTINENCE: ICD-10-CM

## 2020-06-26 PROCEDURE — 1126F PR PAIN SEVERITY QUANTIFIED, NO PAIN PRESENT: ICD-10-PCS | Mod: S$GLB,,, | Performed by: OBSTETRICS & GYNECOLOGY

## 2020-06-26 PROCEDURE — 1126F AMNT PAIN NOTED NONE PRSNT: CPT | Mod: S$GLB,,, | Performed by: OBSTETRICS & GYNECOLOGY

## 2020-06-26 PROCEDURE — 99999 PR PBB SHADOW E&M-EST. PATIENT-LVL IV: ICD-10-PCS | Mod: PBBFAC,,, | Performed by: OBSTETRICS & GYNECOLOGY

## 2020-06-26 PROCEDURE — 1159F PR MEDICATION LIST DOCUMENTED IN MEDICAL RECORD: ICD-10-PCS | Mod: S$GLB,,, | Performed by: OBSTETRICS & GYNECOLOGY

## 2020-06-26 PROCEDURE — 87086 URINE CULTURE/COLONY COUNT: CPT

## 2020-06-26 PROCEDURE — 1101F PR PT FALLS ASSESS DOC 0-1 FALLS W/OUT INJ PAST YR: ICD-10-PCS | Mod: CPTII,S$GLB,, | Performed by: OBSTETRICS & GYNECOLOGY

## 2020-06-26 PROCEDURE — 1101F PT FALLS ASSESS-DOCD LE1/YR: CPT | Mod: CPTII,S$GLB,, | Performed by: OBSTETRICS & GYNECOLOGY

## 2020-06-26 PROCEDURE — 1159F MED LIST DOCD IN RCRD: CPT | Mod: S$GLB,,, | Performed by: OBSTETRICS & GYNECOLOGY

## 2020-06-26 PROCEDURE — 99999 PR PBB SHADOW E&M-EST. PATIENT-LVL IV: CPT | Mod: PBBFAC,,, | Performed by: OBSTETRICS & GYNECOLOGY

## 2020-06-26 PROCEDURE — 99213 OFFICE O/P EST LOW 20 MIN: CPT | Mod: 25,S$GLB,, | Performed by: OBSTETRICS & GYNECOLOGY

## 2020-06-26 PROCEDURE — 99213 PR OFFICE/OUTPT VISIT, EST, LEVL III, 20-29 MIN: ICD-10-PCS | Mod: 25,S$GLB,, | Performed by: OBSTETRICS & GYNECOLOGY

## 2020-06-26 PROCEDURE — 51725 PR SIMPLE CYSTOMETROGRAM: ICD-10-PCS | Mod: S$GLB,,, | Performed by: OBSTETRICS & GYNECOLOGY

## 2020-06-26 PROCEDURE — 51725 SIMPLE CYSTOMETROGRAM: CPT | Mod: S$GLB,,, | Performed by: OBSTETRICS & GYNECOLOGY

## 2020-06-26 RX ORDER — MUPIROCIN 20 MG/G
OINTMENT TOPICAL
Status: CANCELLED | OUTPATIENT
Start: 2020-06-26

## 2020-06-26 RX ORDER — SODIUM CHLORIDE 9 MG/ML
INJECTION, SOLUTION INTRAVENOUS CONTINUOUS
Status: CANCELLED | OUTPATIENT
Start: 2020-06-26

## 2020-06-26 NOTE — PROGRESS NOTES
Urogyn follow up  06/26/2020    [facility] UROGYNECOLOGY-[id number]   [address] [address]    Sury Gao  [id number]  1945      Sury Gao is a 74 y.o.   has a past medical history of Hypothyroidism, Nuclear sclerosis (4/23/2014), and Thyroid disease.  Well known to the clinic last seen in 2018. Returns with bothersome urinary urgency and incontinence.        04/09/2018  PROCEDURE DATE:  04/09/2018     1. Total Laparoscopic Hysterectomy  2. Bilateral salpingectomy-oophorectomy  3. Laparoscopic sacrocolpopexy with permanent mesh   4. Cystoscopy     Urinary urgency and incontinence in the past month, very bothersome . Walks in the morning before she has coffee but there is no leakage. When she walks in the afternoon she notes increase urinary urgency, leakage and generalized bladder discomfort      Interval  HP since the last visit   1)  UI:  (+) VLADIMIR: daily  (+) UUI  - several times a day, associated leakage 3-5 times a week.  (+) pads:1-2.  Daytime frequency: Q 4 -5 hours.  Nocturia: No:    (--) dysuria-  (--) hematuria,  (--) frequent UTIs.  (+) complete bladder emptying.     2)  POP:     Symptoms:(--)  .  (--) vaginal bleeding. (--) vaginal discharge. (+) sexually active: no penetration.  (--) dyspareunia.   (--)  Vaginal dryness.  (--) vaginal estrogen use.     3)  BM:  (+) constipation/straining.  (--) chronic diarrhea. (--) hematochezia.  (--) fecal incontinence.  (--) fecal smearing/urgency.  (--) incomplete evacuation.       Past Medical History:   Diagnosis Date    Hypothyroidism     Nuclear sclerosis 4/23/2014    Thyroid disease     hypothyroid       Past Surgical History:   Procedure Laterality Date    Bilateral lower extremity varicose vein treatment       COLONOSCOPY N/A 2/23/2018    DILATION AND CURETTAGE OF UTERUS      HIP REPLACEMENT ARTHROPLASTY Left     HYSTERECTOMY  04/09/2018    TLHBSO with Sacrocolpopexy for prolapse    matt  neuroma      left foot    MINI-LAPAROTOMY      D&C perforation    OOPHORECTOMY  04/09/2018    TONSILLECTOMY      WRIST SURGERY      left wrist: Swave-Kapunjay surgery       Medications:    Current Outpatient Medications:     aspirin (ECOTRIN) 81 MG EC tablet, Take 81 mg by mouth once daily., Disp: , Rfl:     calcium carbonate (OS-SUHAIL) 600 mg calcium (1,500 mg) Tab, Take 1,200 mg by mouth once daily., Disp: , Rfl:     cholecalciferol, vitamin D3, (VITAMIN D3) 25 mcg (1,000 unit) capsule, Take 1,000 Units by mouth once daily., Disp: , Rfl:     estradioL (ESTRACE) 0.5 MG tablet, Take 1 tablet (0.5 mg total) by mouth once daily., Disp: 90 tablet, Rfl: 3    fexofenadine (ALLEGRA) 60 MG tablet, Take 60 mg by mouth as needed., Disp: , Rfl:     loratadine (CLARITIN) 10 mg tablet, Take 10 mg by mouth daily as needed., Disp: , Rfl:     multivit,iron,minerals/lutein (CENTRUM SILVER ULTRA WOMEN'S ORAL), Take 1 tablet by mouth once daily., Disp: , Rfl:     rosuvastatin (CRESTOR) 10 MG tablet, Take 1 tablet (10 mg total) by mouth every evening., Disp: 90 tablet, Rfl: 3    SYNTHROID 75 mcg tablet, TAKE 1 TABLET BY MOUTH DAILY, Disp: 90 tablet, Rfl: 3    UNABLE TO FIND, Hair, Skin and nails, Disp: , Rfl:     furosemide (LASIX) 40 MG tablet, Take 1 tablet (40 mg total) by mouth daily as needed (swelling)., Disp: 90 tablet, Rfl: 2    mirabegron (MYRBETRIQ) 25 mg Tb24 ER tablet, Take 1 tablet (25 mg total) by mouth once daily., Disp: 30 tablet, Rfl: 11    vitC/E/Zn/copper/lutein/zeaxan (ICAPS AREDS2 ORAL), Take 2 capsules by mouth once daily., Disp: , Rfl:       ROS:  As per HPI.      Exam  /72 (BP Location: Right arm, Patient Position: Sitting)   Wt 67.3 kg (148 lb 5.9 oz)   BMI 25.47 kg/m²   General: alert and oriented, no acute distress  Respiratory: normal respiratory effort  Abd: soft, non-tender, non-distended    Pelvic  Ext. Genitalia: normal external genitalia. Normal bartholin's and skeens  glands  Vagina: + atrophy. Normal vaginal mucosa without lesions. No discharge noted.    Non-tender bladder base without palpable mass.  Cervix: absent  Uterus:  absent   Urethra: no masses or tenderness, caruncle   Urethral meatus: no lesions, caruncle or prolapse.  Rectal: deferred     Impression  1. Urgency incontinence  Urine culture   2. Mixed incontinence  Case Request Operating Room: possible single incision sling        The patient was placed in dorsal lithotomy position with feet in stirrups.  The urethra was prepped x 2 with betadine.  A red rubber catheter was used to drain the bladder.  The bladder was filled first sensation: 160 first desire 220 second desire 280  with capacity 310 mL sterile water to gravity with a 60 mL ayala tipped syringe, at which point she voiced a strong desire to void.  The catheter was removed. She was asked to cough with (+) leak.  The bladder was then drained with a catheter. She tolerated the procedure well.    Impression: (+) VLADIMIR      We reviewed the above issues and discussed options for short-term versus long-term management of her problems.       Mixed urinary incontinence, urge = stress:  Empty bladder every 3 hours.  Empty well: wait a minute, lean forward on toilet.    --Avoid dietary irritants (see sheet).  Keep diary x 3-5 days to determine your irritants.  --KEGELS: do 10 in AM and 10 in PM, holding each x 10 seconds.  When you feel urge to go, STOP, KEGEL, and when urge has passed, then go to bathroom.  Consider PT in future.    --URGE:  Myrbetriq 25 mg daily.  SE profile reviewed.  Takes 2-4 weeks to see if will have effect.    --STRESS:  Pessary vs. Sling vs periurethral bulking . She wants to proceed with surgery plan to proceed on July 2, 7 AM         25 minutes were spent in face to face time with this patient  90 % of this time was spent in counseling and/or coordination of care     Maxx Castellanos DO  [facility]  Division of Female Pelvic  Medicine and Reconstructive Surgery  Department of Obstetrics & Gynecology

## 2020-06-27 LAB — BACTERIA UR CULT: NO GROWTH

## 2020-06-29 ENCOUNTER — PATIENT MESSAGE (OUTPATIENT)
Dept: UROGYNECOLOGY | Facility: CLINIC | Age: 75
End: 2020-06-29

## 2020-06-29 ENCOUNTER — TELEPHONE (OUTPATIENT)
Dept: UROGYNECOLOGY | Facility: CLINIC | Age: 75
End: 2020-06-29

## 2020-06-29 NOTE — TELEPHONE ENCOUNTER
----- Message from Koki Cline sent at 6/29/2020 10:17 AM CDT -----  Regarding: reschedule procedure  Type: Patient Call Back    Who called:pt    What is the request in detail:pt requesting to reschedule procedure. Call pt    Can the clinic reply by [facility]?    Would the patient rather a call back or a response via [facility]? call    Best call back number:[phone] (home)       Additional Information:

## 2020-06-30 ENCOUNTER — OFFICE VISIT (OUTPATIENT)
Dept: UROGYNECOLOGY | Facility: CLINIC | Age: 75
End: 2020-06-30
Payer: COMMERCIAL

## 2020-06-30 VITALS
BODY MASS INDEX: 25.54 KG/M2 | WEIGHT: 148.81 LBS | DIASTOLIC BLOOD PRESSURE: 62 MMHG | SYSTOLIC BLOOD PRESSURE: 124 MMHG

## 2020-06-30 DIAGNOSIS — N39.46 MIXED INCONTINENCE: Primary | ICD-10-CM

## 2020-06-30 PROCEDURE — 1159F MED LIST DOCD IN RCRD: CPT | Mod: S$GLB,,, | Performed by: OBSTETRICS & GYNECOLOGY

## 2020-06-30 PROCEDURE — 1126F AMNT PAIN NOTED NONE PRSNT: CPT | Mod: S$GLB,,, | Performed by: OBSTETRICS & GYNECOLOGY

## 2020-06-30 PROCEDURE — 99999 PR PBB SHADOW E&M-EST. PATIENT-LVL III: ICD-10-PCS | Mod: PBBFAC,,, | Performed by: OBSTETRICS & GYNECOLOGY

## 2020-06-30 PROCEDURE — 99213 PR OFFICE/OUTPT VISIT, EST, LEVL III, 20-29 MIN: ICD-10-PCS | Mod: S$GLB,,, | Performed by: OBSTETRICS & GYNECOLOGY

## 2020-06-30 PROCEDURE — 99999 PR PBB SHADOW E&M-EST. PATIENT-LVL III: CPT | Mod: PBBFAC,,, | Performed by: OBSTETRICS & GYNECOLOGY

## 2020-06-30 PROCEDURE — 1101F PR PT FALLS ASSESS DOC 0-1 FALLS W/OUT INJ PAST YR: ICD-10-PCS | Mod: CPTII,S$GLB,, | Performed by: OBSTETRICS & GYNECOLOGY

## 2020-06-30 PROCEDURE — 1159F PR MEDICATION LIST DOCUMENTED IN MEDICAL RECORD: ICD-10-PCS | Mod: S$GLB,,, | Performed by: OBSTETRICS & GYNECOLOGY

## 2020-06-30 PROCEDURE — 1126F PR PAIN SEVERITY QUANTIFIED, NO PAIN PRESENT: ICD-10-PCS | Mod: S$GLB,,, | Performed by: OBSTETRICS & GYNECOLOGY

## 2020-06-30 PROCEDURE — 99213 OFFICE O/P EST LOW 20 MIN: CPT | Mod: S$GLB,,, | Performed by: OBSTETRICS & GYNECOLOGY

## 2020-06-30 PROCEDURE — 3008F PR BODY MASS INDEX (BMI) DOCUMENTED: ICD-10-PCS | Mod: CPTII,S$GLB,, | Performed by: OBSTETRICS & GYNECOLOGY

## 2020-06-30 PROCEDURE — 1101F PT FALLS ASSESS-DOCD LE1/YR: CPT | Mod: CPTII,S$GLB,, | Performed by: OBSTETRICS & GYNECOLOGY

## 2020-06-30 PROCEDURE — 3008F BODY MASS INDEX DOCD: CPT | Mod: CPTII,S$GLB,, | Performed by: OBSTETRICS & GYNECOLOGY

## 2020-06-30 NOTE — PROGRESS NOTES
Urogyn follow up  06/30/2020    [facility] UROGYNECOLOGY-[id number]   [address] [address]    Sury Gao  [id number]  1945      Sury Gao is a 74 y.o.   has a past medical history of Hypothyroidism, Nuclear sclerosis (4/23/2014), and Thyroid disease.  Well known to the clinic last seen in 2018. Returns with bothersome urinary urgency and incontinence.        04/09/2018  PROCEDURE DATE:  04/09/2018     1. Total Laparoscopic Hysterectomy  2. Bilateral salpingectomy-oophorectomy  3. Laparoscopic sacrocolpopexy with permanent mesh   4. Cystoscopy     Urinary urgency and incontinence in the past month, very bothersome . Walks in the morning before she has coffee but there is no leakage. When she walks in the afternoon she notes increase urinary urgency, leakage and generalized bladder discomfort      Interval  HP since the last visit   1)  UI:  (+) VLADIMIR: daily  (+) UUI  - several times a day, associated leakage 3-5 times a week.  (+) pads:1-2.  Daytime frequency: Q 4 -5 hours.  Nocturia: No:    (--) dysuria-  (--) hematuria,  (--) frequent UTIs.  (+) complete bladder emptying.     2)  POP:     Symptoms:(--)  .  (--) vaginal bleeding. (--) vaginal discharge. (+) sexually active: no penetration.  (--) dyspareunia.   (--)  Vaginal dryness.  (--) vaginal estrogen use.     3)  BM:  (+) constipation/straining.  (--) chronic diarrhea. (--) hematochezia.  (--) fecal incontinence.  (--) fecal smearing/urgency.  (--) incomplete evacuation.       Past Medical History:   Diagnosis Date    Hypothyroidism     Nuclear sclerosis 4/23/2014    Thyroid disease     hypothyroid       Past Surgical History:   Procedure Laterality Date    Bilateral lower extremity varicose vein treatment       COLONOSCOPY N/A 2/23/2018    DILATION AND CURETTAGE OF UTERUS      HIP REPLACEMENT ARTHROPLASTY Left     HYSTERECTOMY  04/09/2018    TLHBSO with Sacrocolpopexy for prolapse    matt  neuroma      left foot    MINI-LAPAROTOMY      D&C perforation    OOPHORECTOMY  04/09/2018    TONSILLECTOMY      WRIST SURGERY      left wrist: Swave-Kapunjay surgery       Medications:    Current Outpatient Medications:     aspirin (ECOTRIN) 81 MG EC tablet, Take 81 mg by mouth once daily., Disp: , Rfl:     calcium carbonate (OS-SUHAIL) 600 mg calcium (1,500 mg) Tab, Take 1,200 mg by mouth once daily., Disp: , Rfl:     cholecalciferol, vitamin D3, (VITAMIN D3) 25 mcg (1,000 unit) capsule, Take 1,000 Units by mouth once daily., Disp: , Rfl:     estradioL (ESTRACE) 0.5 MG tablet, Take 1 tablet (0.5 mg total) by mouth once daily., Disp: 90 tablet, Rfl: 3    fexofenadine (ALLEGRA) 60 MG tablet, Take 60 mg by mouth as needed., Disp: , Rfl:     loratadine (CLARITIN) 10 mg tablet, Take 10 mg by mouth daily as needed., Disp: , Rfl:     multivit,iron,minerals/lutein (CENTRUM SILVER ULTRA WOMEN'S ORAL), Take 1 tablet by mouth once daily., Disp: , Rfl:     rosuvastatin (CRESTOR) 10 MG tablet, Take 1 tablet (10 mg total) by mouth every evening., Disp: 90 tablet, Rfl: 3    SYNTHROID 75 mcg tablet, TAKE 1 TABLET BY MOUTH DAILY, Disp: 90 tablet, Rfl: 3    UNABLE TO FIND, Hair, Skin and nails, Disp: , Rfl:     vitC/E/Zn/copper/lutein/zeaxan (ICAPS AREDS2 ORAL), Take 2 capsules by mouth once daily., Disp: , Rfl:     furosemide (LASIX) 40 MG tablet, Take 1 tablet (40 mg total) by mouth daily as needed (swelling)., Disp: 90 tablet, Rfl: 2    mirabegron (MYRBETRIQ) 25 mg Tb24 ER tablet, Take 1 tablet (25 mg total) by mouth once daily. (Patient not taking: Reported on 6/30/2020), Disp: 30 tablet, Rfl: 11      ROS:  As per HPI.      Exam  /62 (BP Location: Left arm, Patient Position: Sitting)   Wt 67.5 kg (148 lb 13 oz)   BMI 25.54 kg/m²   General: alert and oriented, no acute distress  Respiratory: normal respiratory effort  Abd: soft, non-tender, non-distended    Pelvic  Ext. Genitalia: normal external genitalia.  Normal bartholin's and skeens glands  Vagina: + atrophy. Normal vaginal mucosa without lesions. No discharge noted.    Non-tender bladder base without palpable mass.  Cervix: absent  Uterus:  absent   Urethra: no masses or tenderness, caruncle   Urethral meatus: no lesions, caruncle or prolapse.  Rectal: deferred     Impression  1. Mixed incontinence          The patient was placed in dorsal lithotomy position with feet in stirrups.  The urethra was prepped x 2 with betadine.  A red rubber catheter was used to drain the bladder.  The bladder was filled first sensation: 160 first desire 220 second desire 280  with capacity 310 mL sterile water to gravity with a 60 mL ayala tipped syringe, at which point she voiced a strong desire to void.  The catheter was removed. She was asked to cough with (+) leak.  The bladder was then drained with a catheter. She tolerated the procedure well.    Impression: (+) VLADIMIR      We reviewed the above issues and discussed options for short-term versus long-term management of her problems.       Mixed urinary incontinence, urge = stress:  Empty bladder every 3 hours.  Empty well: wait a minute, lean forward on toilet.    --Avoid dietary irritants (see sheet).  Keep diary x 3-5 days to determine your irritants.  --KEGELS: do 10 in AM and 10 in PM, holding each x 10 seconds.  When you feel urge to go, STOP, KEGEL, and when urge has passed, then go to bathroom.  Consider PT in future.    --URGE:  Myrbetriq 25 mg daily.  SE profile reviewed.  Takes 2-4 weeks to see if will have effect.    --STRESS:  Pessary vs. Sling vs periurethral bulking . She wants to proceed with surgery. Long discussion of options including retrpopubic, transobturator and single incision slings. Plan to proceed on July 2, 7 AM     25 minutes were spent in face to face time with this patient  90 % of this time was spent in counseling and/or coordination of care     Maxx Castellanos DO  [facility]  Division of Female Pelvic Medicine and Reconstructive Surgery  Department of Obstetrics & Gynecology

## 2020-07-01 ENCOUNTER — HOSPITAL ENCOUNTER (OUTPATIENT)
Dept: RADIOLOGY | Facility: HOSPITAL | Age: 75
Discharge: HOME | End: 2020-07-01
Attending: FAMILY MEDICINE
Payer: MEDICARE

## 2020-07-01 ENCOUNTER — TELEPHONE (OUTPATIENT)
Dept: INTERNAL MEDICINE | Facility: CLINIC | Age: 75
End: 2020-07-01

## 2020-07-01 DIAGNOSIS — N17.9 AKI (ACUTE KIDNEY INJURY): ICD-10-CM

## 2020-07-01 PROCEDURE — 76770 US EXAM ABDO BACK WALL COMP: CPT | Mod: 26,,, | Performed by: RADIOLOGY

## 2020-07-01 PROCEDURE — 76770 US RETROPERITONEAL COMPLETE: ICD-10-PCS | Mod: 26,,, | Performed by: RADIOLOGY

## 2020-07-01 PROCEDURE — 76770 US EXAM ABDO BACK WALL COMP: CPT | Mod: TC

## 2020-07-01 NOTE — TELEPHONE ENCOUNTER
"MyChart message sent.  Please let the patient know their results, thank you:    " Hi Ms. Gao,    I have reviewed the report from your recent kidney ultrasound, and the results are reassuring.  Everything is normal, aside from a cyst in the left kidney which looks similar in comparison to the CT scan you had in 2018.  This should not be worrisome and does not need any additional evaluation.  Please contact the office if you have any additional questions or concerns.    Jann Maurer MD      "

## 2020-07-07 ENCOUNTER — OFFICE VISIT (OUTPATIENT)
Dept: INTERNAL MEDICINE | Facility: CLINIC | Age: 75
End: 2020-07-07
Payer: COMMERCIAL

## 2020-07-07 VITALS
HEIGHT: 64 IN | TEMPERATURE: 97 F | DIASTOLIC BLOOD PRESSURE: 58 MMHG | HEART RATE: 58 BPM | WEIGHT: 148.5 LBS | OXYGEN SATURATION: 98 % | SYSTOLIC BLOOD PRESSURE: 90 MMHG | BODY MASS INDEX: 25.35 KG/M2

## 2020-07-07 DIAGNOSIS — E03.9 HYPOTHYROIDISM, UNSPECIFIED TYPE: Primary | ICD-10-CM

## 2020-07-07 PROCEDURE — 1159F MED LIST DOCD IN RCRD: CPT | Mod: S$GLB,,, | Performed by: FAMILY MEDICINE

## 2020-07-07 PROCEDURE — 1159F PR MEDICATION LIST DOCUMENTED IN MEDICAL RECORD: ICD-10-PCS | Mod: S$GLB,,, | Performed by: FAMILY MEDICINE

## 2020-07-07 PROCEDURE — 99999 PR PBB SHADOW E&M-EST. PATIENT-LVL IV: ICD-10-PCS | Mod: PBBFAC,,, | Performed by: FAMILY MEDICINE

## 2020-07-07 PROCEDURE — 1126F AMNT PAIN NOTED NONE PRSNT: CPT | Mod: S$GLB,,, | Performed by: FAMILY MEDICINE

## 2020-07-07 PROCEDURE — 99213 OFFICE O/P EST LOW 20 MIN: CPT | Mod: S$GLB,,, | Performed by: FAMILY MEDICINE

## 2020-07-07 PROCEDURE — 1101F PT FALLS ASSESS-DOCD LE1/YR: CPT | Mod: CPTII,S$GLB,, | Performed by: FAMILY MEDICINE

## 2020-07-07 PROCEDURE — 3008F BODY MASS INDEX DOCD: CPT | Mod: CPTII,S$GLB,, | Performed by: FAMILY MEDICINE

## 2020-07-07 PROCEDURE — 1101F PR PT FALLS ASSESS DOC 0-1 FALLS W/OUT INJ PAST YR: ICD-10-PCS | Mod: CPTII,S$GLB,, | Performed by: FAMILY MEDICINE

## 2020-07-07 PROCEDURE — 99999 PR PBB SHADOW E&M-EST. PATIENT-LVL IV: CPT | Mod: PBBFAC,,, | Performed by: FAMILY MEDICINE

## 2020-07-07 PROCEDURE — 3008F PR BODY MASS INDEX (BMI) DOCUMENTED: ICD-10-PCS | Mod: CPTII,S$GLB,, | Performed by: FAMILY MEDICINE

## 2020-07-07 PROCEDURE — 99213 PR OFFICE/OUTPT VISIT, EST, LEVL III, 20-29 MIN: ICD-10-PCS | Mod: S$GLB,,, | Performed by: FAMILY MEDICINE

## 2020-07-07 PROCEDURE — 1126F PR PAIN SEVERITY QUANTIFIED, NO PAIN PRESENT: ICD-10-PCS | Mod: S$GLB,,, | Performed by: FAMILY MEDICINE

## 2020-07-07 RX ORDER — LEVOTHYROXINE SODIUM 75 UG/1
TABLET ORAL
Qty: 90 TABLET | Refills: 3 | Status: SHIPPED | OUTPATIENT
Start: 2020-07-07 | End: 2021-01-11 | Stop reason: SDUPTHER

## 2020-07-07 NOTE — PROGRESS NOTES
"[facility] [address] - Family Medicine/ Internal Medicine  Clinic Note      Subjective:       Patient ID: Sury Gao is a 74 y.o. female.    Chief Complaint: Thyroid Problem    Thyroid Problem  Presents for follow-up visit. Patient reports no anxiety, cold intolerance, depressed mood, diaphoresis, diarrhea, dry skin, fatigue, hair loss, heat intolerance, hoarse voice, leg swelling, palpitations, weight gain or weight loss. The symptoms have been stable.     Review of Systems   Constitutional: Negative for diaphoresis, fatigue, weight gain and weight loss.   HENT: Negative for hoarse voice.    Respiratory: Negative for cough and shortness of breath.    Cardiovascular: Negative for chest pain and palpitations.   Gastrointestinal: Negative for diarrhea.   Endocrine: Negative for cold intolerance and heat intolerance.   Psychiatric/Behavioral: The patient is not nervous/anxious.        Objective:      BP (!) 90/58 (BP Location: Right arm, Patient Position: Sitting, BP Method: Medium (Manual))   Pulse (!) 58   Temp 97.3 °F (36.3 °C)   Ht 5' 4" (1.626 m)   Wt 67.4 kg (148 lb 7.7 oz)   SpO2 98%   BMI 25.49 kg/m²   Physical Exam  Vitals signs reviewed.   Constitutional:       General: She is not in acute distress.     Appearance: She is well-developed.   HENT:      Head: Normocephalic and atraumatic.   Neck:      Musculoskeletal: Normal range of motion.      Thyroid: No thyroid mass or thyromegaly.   Cardiovascular:      Rate and Rhythm: Normal rate and regular rhythm.      Heart sounds: No murmur.   Pulmonary:      Effort: Pulmonary effort is normal. No respiratory distress.      Breath sounds: Normal breath sounds. No wheezing or rales.   Abdominal:      General: Bowel sounds are normal. There is no distension.      Palpations: Abdomen is soft.      Tenderness: There is no abdominal tenderness.   Musculoskeletal: Normal range of motion.   Skin:     General: Skin is warm and dry.      " Findings: No rash.   Neurological:      Mental Status: She is alert and oriented to person, place, and time.      Cranial Nerves: No cranial nerve deficit.      Sensory: No sensory deficit.      Motor: No abnormal muscle tone.         Assessment:       1. Hypothyroidism, unspecified type        Plan:     1. Hypothyroidism, unspecified type  - chronic condition, symptoms are well-controlled, will recheck labs at her next lab drawand adjust regimen as indicated, refill provided for the meantime  - - TSH; Future  - - T4, free; Future  - - SYNTHROID 75 mcg tablet; TAKE 1 TABLET BY MOUTH DAILY  Dispense: 90 tablet; Refill: 3  - questions answered, warning signs reviewed, patient is comfortable with this plan and was encouraged to call the office for any concerns or worsening of condition     - Follow up in about 6 months (around 1/7/2021) for follow-up medications.     Jann Maurer MD  7/7/2020          Medication List with Changes/Refills   Current Medications    ASPIRIN (ECOTRIN) 81 MG EC TABLET    Take 81 mg by mouth once daily.    CALCIUM CARBONATE (OS-SUHAIL) 600 MG CALCIUM (1,500 MG) TAB    Take 1,200 mg by mouth once daily.    CHOLECALCIFEROL, VITAMIN D3, (VITAMIN D3) 25 MCG (1,000 UNIT) CAPSULE    Take 1,000 Units by mouth once daily.    ESTRADIOL (ESTRACE) 0.5 MG TABLET    Take 1 tablet (0.5 mg total) by mouth once daily.    FEXOFENADINE (ALLEGRA) 60 MG TABLET    Take 60 mg by mouth as needed.    FUROSEMIDE (LASIX) 40 MG TABLET    Take 1 tablet (40 mg total) by mouth daily as needed (swelling).    LORATADINE (CLARITIN) 10 MG TABLET    Take 10 mg by mouth daily as needed.    MIRABEGRON (MYRBETRIQ) 25 MG TB24 ER TABLET    Take 1 tablet (25 mg total) by mouth once daily.    MULTIVIT,IRON,MINERALS/LUTEIN (CENTRUM SILVER ULTRA WOMEN'S ORAL)    Take 1 tablet by mouth once daily.    ROSUVASTATIN (CRESTOR) 10 MG TABLET    Take 1 tablet (10 mg total) by mouth every evening.    UNABLE TO FIND    Hair, Skin and nails     VITC/E/ZN/COPPER/LUTEIN/ZEAXAN (ICAPS AREDS2 ORAL)    Take 2 capsules by mouth once daily.   Changed and/or Refilled Medications    Modified Medication Previous Medication    SYNTHROID 75 MCG TABLET SYNTHROID 75 mcg tablet       TAKE 1 TABLET BY MOUTH DAILY    TAKE 1 TABLET BY MOUTH DAILY

## 2020-07-07 NOTE — PATIENT INSTRUCTIONS
Prevention Guidelines, Women Ages 65 and Older  Screening tests and vaccines are an important part of managing your health. Health counseling is essential, too. Below are guidelines for these, for women ages 65 and older. Talk with your healthcare provider to make sure youre up to date on what you need.  Screening Who needs it How often   Type 2 diabetes or prediabetes All adults beginning at age 45 and adults without symptoms at any age who are overweight or obese and have 1 or more additional risk factors for diabetes At least every 3 years   Alcohol misuse All women in this age group At routine exams   Blood pressure All women in this age group Every 2 years if your blood pressure is less than 120/80 mm Hg; yearly if your systolic blood pressure is 120 to 139 mm Hg, or your diastolic blood pressure reading is 80 to 89 mm Hg   Breast cancer All women in this age group Yearly mammogram and clinical breast exam1   Cervical cancer Only women who had abnormal screening results before age 65 Talk with your healthcare provider   Chlamydia Women at increased risk for infection At routine exams   Colorectal cancer All women in this age group1 Flexible sigmoidoscopy every 5 years, or colonoscopy every 10 years, or double-contrast barium enema every 5 years; yearly fecal occult blood test or fecal immunochemical test; or a stool DNA test as often as your healthcare provider advises; talk with your healthcare provider about which tests are best for you   Depression All women in this age group At routine exams   Gonorrhea Sexually active women at increased risk for infection At routine exams   Hepatitis C Anyone at increased risk; 1 time for those born between 1945 and 1965 At routine exams   High cholesterol or triglycerides All women in this age group who are at risk for coronary artery disease At least every 5 years   HIV Women at increased risk for infection - talk with your healthcare provider At routine exams   Lung  cancer Adults age 55 to 80 who have smoked Yearly screening in smokers with 30 pack-year history of smoking or who quit within 15 years   Obesity All women in this age group At routine exams   Osteoporosis All women in this age group Bone density test at age 65, then follow-up as advised by your healthcare provider   Syphilis Women at increased risk for infection - talk with your healthcare provider At routine exams   Thyroid-Stimulating Hormone (TSH) All women in this age group Every 5 years   Tuberculosis Women at increased risk for infection - talk with your healthcare provider Ask your healthcare provider   Vision All women in this age group Every 1 to 2 years; if you have a chronic health condition, ask your healthcare provider if you need exams more often   Vaccine Who needs it How often   Chickenpox (varicella) All women in this age group who have no record of this infection or vaccine 2 doses; second dose should be given at least 4 weeks after the first dose   Hepatitis A Women at increased risk for infection - talk with your healthcare provider 2 doses given 6 months apart   Hepatitis B Women at increased risk for infection - talk with your healthcare provider 3 doses over 6 months; second dose should be given 1 month after the first dose; the third dose should be given at least 2 months after the second dose and at least 4 months after the first dose   Haemophilus influenza Type B (HIB) Women at increased risk for infection - talk with your healthcare provider 1 to 3 doses   Influenza (flu) All women in this age group Once a year   Pneumococcal conjugate vaccine (PCV13) and pneumococcal polysaccharide vaccine (PPSV23) All women in this age group 1 dose of each vaccine   Tetanus/diphtheria/pertussis (Td/Tdap) booster All women in this age group Td every 10 years, or a one-time dose of Tdap instead of a Td booster after age 18, then Td every 10 years   Zoster All women in this age group 1 dose   Counseling  Who needs it How often   Diet and exercise Women who are overweight or obese When diagnosed, and then at routine exams   Fall prevention (exercise and vitamin D supplements) All women in this age group At routine exams   Sexually transmitted infection prevention Women at increased risk for infection - talk with your healthcare provider At routine exams   Use of daily aspirin Women ages 55 and up in this age group who are at risk for cardiovascular health problems such as stroke When your risk is known   Use of tobacco and the health effects it can cause All women in this age group Every exam   1American [employer]  Date Last Reviewed: 8/9/2015  © 2117-3628 [employer]. [address] [place] [address], [place] [address]. All rights reserved. This information is not intended as a substitute for professional medical care. Always follow your healthcare professional's instructions.

## 2020-07-28 ENCOUNTER — TELEPHONE (OUTPATIENT)
Dept: UROGYNECOLOGY | Facility: CLINIC | Age: 75
End: 2020-07-28

## 2020-07-28 NOTE — TELEPHONE ENCOUNTER
----- Message from Adeline Raad sent at 7/28/2020 10:56 AM CDT -----  Contact: ANGELO MCKEON [[id number]]  Type: Patient Call Back    Who called: ANGELO MCKEON [[id number]]    What is the request in detail: Patient is requesting a call back. She states that she would like to schedule her pre op appointment before 08/07. She states that she will be out of town after that.   Please advise.    Can the clinic reply by [facility]? No    Best call back number: [phone]    Additional Information: N/A

## 2020-07-28 NOTE — TELEPHONE ENCOUNTER
Spoke with pt who wanted to schedule her pre op appointment for next week due to her going out of town on 8/11 advised pt that the doctor won't be in for to schedule her appointment earlier and her Covid test needs to be a least 72 hours prior to procedure pt is scheduled on 8/17 for surgery and won't be back in town until 8/15 advised pt that I will see if she can get a later surgery date and be in contact with her she voiced understanding and call was ended.

## 2020-08-05 ENCOUNTER — LAB VISIT (OUTPATIENT)
Dept: LAB | Facility: HOSPITAL | Age: 75
End: 2020-08-05
Attending: INTERNAL MEDICINE
Payer: MEDICARE

## 2020-08-05 DIAGNOSIS — E03.9 HYPOTHYROIDISM, UNSPECIFIED TYPE: ICD-10-CM

## 2020-08-05 DIAGNOSIS — E78.00 HYPERCHOLESTEREMIA: ICD-10-CM

## 2020-08-05 LAB
ALBUMIN SERPL BCP-MCNC: 3 G/DL (ref 3.5–5.2)
ALP SERPL-CCNC: 32 U/L (ref 55–135)
ALT SERPL W/O P-5'-P-CCNC: 15 U/L (ref 10–44)
AST SERPL-CCNC: 18 U/L (ref 10–40)
BILIRUB DIRECT SERPL-MCNC: 0.3 MG/DL (ref 0.1–0.3)
BILIRUB SERPL-MCNC: 0.5 MG/DL (ref 0.1–1)
CHOLEST SERPL-MCNC: 151 MG/DL (ref 120–199)
CHOLEST/HDLC SERPL: 2.4 {RATIO} (ref 2–5)
HDLC SERPL-MCNC: 64 MG/DL (ref 40–75)
HDLC SERPL: 42.4 % (ref 20–50)
LDLC SERPL CALC-MCNC: 77.4 MG/DL (ref 63–159)
NONHDLC SERPL-MCNC: 87 MG/DL
PROT SERPL-MCNC: 5.3 G/DL (ref 6–8.4)
T4 FREE SERPL-MCNC: 1.01 NG/DL (ref 0.71–1.51)
TRIGL SERPL-MCNC: 48 MG/DL (ref 30–150)
TSH SERPL DL<=0.005 MIU/L-ACNC: 2.53 UIU/ML (ref 0.4–4)

## 2020-08-05 PROCEDURE — 84443 ASSAY THYROID STIM HORMONE: CPT

## 2020-08-05 PROCEDURE — 84439 ASSAY OF FREE THYROXINE: CPT

## 2020-08-05 PROCEDURE — 80061 LIPID PANEL: CPT

## 2020-08-05 PROCEDURE — 36415 COLL VENOUS BLD VENIPUNCTURE: CPT | Mod: PO

## 2020-08-05 PROCEDURE — 80076 HEPATIC FUNCTION PANEL: CPT

## 2020-08-10 ENCOUNTER — TELEPHONE (OUTPATIENT)
Dept: CARDIOLOGY | Facility: CLINIC | Age: 75
End: 2020-08-10

## 2020-08-10 NOTE — TELEPHONE ENCOUNTER
----- Message from Renate Alejandre MD sent at 8/10/2020 10:15 AM CDT -----  Cholesterol level have improved a lot. Continue  Medications as prescribed.

## 2020-08-12 ENCOUNTER — TELEPHONE (OUTPATIENT)
Dept: UROGYNECOLOGY | Facility: CLINIC | Age: 75
End: 2020-08-12

## 2020-08-12 NOTE — TELEPHONE ENCOUNTER
Attempted to contact pt to reschedule Covid test from Wednesday to Tuesday no answer left message.

## 2020-08-18 ENCOUNTER — HOSPITAL ENCOUNTER (OUTPATIENT)
Dept: PREADMISSION TESTING | Facility: OTHER | Age: 75
Discharge: HOME | End: 2020-08-18
Attending: OBSTETRICS & GYNECOLOGY
Payer: MEDICARE

## 2020-08-18 ENCOUNTER — ANESTHESIA EVENT (OUTPATIENT)
Dept: SURGERY | Facility: OTHER | Age: 75
End: 2020-08-18
Payer: MEDICARE

## 2020-08-18 VITALS
BODY MASS INDEX: 24.59 KG/M2 | OXYGEN SATURATION: 98 % | HEIGHT: 64 IN | WEIGHT: 144 LBS | TEMPERATURE: 97 F | SYSTOLIC BLOOD PRESSURE: 98 MMHG | HEART RATE: 60 BPM | DIASTOLIC BLOOD PRESSURE: 62 MMHG

## 2020-08-18 DIAGNOSIS — Z01.818 PRE-OP EVALUATION: ICD-10-CM

## 2020-08-18 PROCEDURE — U0003 INFECTIOUS AGENT DETECTION BY NUCLEIC ACID (DNA OR RNA); SEVERE ACUTE RESPIRATORY SYNDROME CORONAVIRUS 2 (SARS-COV-2) (CORONAVIRUS DISEASE [COVID-19]), AMPLIFIED PROBE TECHNIQUE, MAKING USE OF HIGH THROUGHPUT TECHNOLOGIES AS DESCRIBED BY CMS-2020-01-R: HCPCS

## 2020-08-18 RX ORDER — CELECOXIB 200 MG/1
400 CAPSULE ORAL
Status: CANCELLED | OUTPATIENT
Start: 2020-08-18 | End: 2020-08-18

## 2020-08-18 RX ORDER — SODIUM CHLORIDE, SODIUM LACTATE, POTASSIUM CHLORIDE, CALCIUM CHLORIDE 600; 310; 30; 20 MG/100ML; MG/100ML; MG/100ML; MG/100ML
INJECTION, SOLUTION INTRAVENOUS CONTINUOUS
Status: CANCELLED | OUTPATIENT
Start: 2020-08-18

## 2020-08-18 RX ORDER — ACETAMINOPHEN 500 MG
1000 TABLET ORAL
Status: CANCELLED | OUTPATIENT
Start: 2020-08-18 | End: 2020-08-18

## 2020-08-18 RX ORDER — LIDOCAINE HYDROCHLORIDE 10 MG/ML
0.5 INJECTION, SOLUTION EPIDURAL; INFILTRATION; INTRACAUDAL; PERINEURAL ONCE
Status: CANCELLED | OUTPATIENT
Start: 2020-08-18 | End: 2020-08-18

## 2020-08-18 NOTE — DISCHARGE INSTRUCTIONS
Information to Prepare you for your Surgery    PRE-ADMIT TESTING -  [phone]    [address]  [place] [facility]          Your surgery has been scheduled at [facility]. We are pleased to have the opportunity to serve you. For Further Information please call [phone].    On the day of surgery please report to the Information Desk on the 1st floor.    · CONTACT YOUR PHYSICIAN'S OFFICE THE DAY PRIOR TO YOUR SURGERY TO OBTAIN YOUR ARRIVAL TIME.     · The evening before surgery do not eat anything after 9 p.m. ( this includes hard candy, chewing gum and mints).  You may only have GATORADE, POWERADE AND WATER  from 9 p.m. until you leave your home.   DO NOT DRINK ANY LIQUIDS ON THE WAY TO THE HOSPITAL.      SPECIAL MEDICATION INSTRUCTIONS: TAKE medications checked off by the Anesthesiologist on your Medication List.    Angiogram Patients: Take medications as instructed by your physician, including aspirin.     Surgery Patients:    If you take ASPIRIN - Your PHYSICIAN/SURGEON will need to inform you IF/OR when you need to stop taking aspirin prior to your surgery.     Do Not take any medications containing IBUPROFEN.  Do Not Wear any make-up or dark nail polish   (especially eye make-up) to surgery. If you come to surgery with makeup on you will be required to remove the makeup or nail polish.    Do not shave your surgical area at least 5 days prior to your surgery. The surgical prep will be performed at the hospital according to Infection Control regulations.    Leave all valuables at home.   Do Not wear any jewelry or watches, including any metal in body piercings. Jewelry must be removed prior to coming to the hospital.  There is a possibility that rings that are unable to be removed may be cut off if they are on the surgical extremity.    Contact Lens must be removed before surgery. Either do not wear the contact lens or bring a case and solution for  storage.  Please bring a container for eyeglasses or dentures as required.  Bring any paperwork your physician has provided, such as consent forms,  history and physicals, doctor's orders, etc.   Bring comfortable clothes that are loose fitting to wear upon discharge. Take into consideration the type of surgery being performed.  Maintain your diet as advised per your physician the day prior to surgery.      Adequate rest the night before surgery is advised.   Park in the Parking lot behind the hospital or in the [facility] across the street from the parking lot. Parking is complimentary.  If you will be discharged the same day as your procedure, please arrange for a responsible adult to drive you home or to accompany you if traveling by taxi.   YOU WILL NOT BE PERMITTED TO DRIVE OR TO LEAVE THE HOSPITAL ALONE AFTER SURGERY.   If you are being discharged the same day, it is strongly recommended that you arrange for someone to remain with you for the first 24 hrs following your surgery.    The Surgeon will speak to your family/visitor after your surgery regarding the outcome of your surgery and post op care.  The Surgeon may speak to you after your surgery, but there is a possibility you may not remember the details.  Please check with your family members regarding the conversation with the Surgeon.    We strongly recommend whoever is bringing you home be present for discharge instructions.  This will ensure a thorough understanding for your post op home care.    ALL CHILDREN MUST ALWAYS BE ACCOMPANIED BY AN ADULT.    Visitors-Refer to current Visitor policy handouts.    Thank you for your cooperation.  The Staff of [facility].                Bathing Instructions with Hibiclens     Shower the evening before and morning of your procedure with Hibiclens:   Wash your face with water and your regular face wash/soap   Apply Hibiclens directly on your skin or on a wet washcloth and wash  gently. When showering: Move away from the shower stream when applying Hibiclens to avoid rinsing off too soon.   Rinse thoroughly with warm water   Do not dilute Hibiclens         Dry off as usual, do not use any deodorant, powder, body lotions, perfume, after shave or cologne.

## 2020-08-18 NOTE — ANESTHESIA PREPROCEDURE EVALUATION
08/18/2020  Sury Gao is a 74 y.o., female.    Anesthesia Evaluation    I have reviewed the Patient Summary Reports.    I have reviewed the Nursing Notes. I have reviewed the NPO Status.   I have reviewed the Medications.     Review of Systems  Anesthesia Hx:  History of prior surgery of interest to airway management or planning: Previous anesthesia: Spinal  Total Hip [place] Spinal  1 yr ago with spinal.  Denies Family Hx of Anesthesia complications.   Denies Personal Hx of Anesthesia complications.   Social:  Non-Smoker    Hematology/Oncology:  Hematology Normal   Oncology Normal     EENT/Dental:   chronic allergic rhinitis   Cardiovascular:  Cardiovascular Normal Exercise tolerance: good     Pulmonary:  Pulmonary Normal    Renal/:  Renal/ Normal     Hepatic/GI:  Hepatic/GI Normal    Musculoskeletal:  Musculoskeletal Normal    Neurological:  Neurology Normal    Endocrine:   Hypothyroidism    Dermatological:  Skin Normal    Psych:  Psychiatric Normal           Physical Exam  General:  Well nourished    Airway/Jaw/Neck:  Airway Findings: Mouth Opening: Normal Tongue: Normal  General Airway Assessment: Adult  Mallampati: I      Dental:  Dental Findings: In tact, Molar caps        Mental Status:  Mental Status Findings:  Cooperative, Alert and Oriented         Anesthesia Plan  Type of Anesthesia, risks & benefits discussed:  Anesthesia Type:  general  Patient's Preference:   Intra-op Monitoring Plan: standard ASA monitors  Intra-op Monitoring Plan Comments:   Post Op Pain Control Plan: per primary service following discharge from PACU and multimodal analgesia  Post Op Pain Control Plan Comments:   Induction:   IV  Beta Blocker:         Informed Consent: Patient understands risks and agrees with Anesthesia plan.  Questions answered. Anesthesia consent signed with patient.  ASA Score: 2     Day  of Surgery Review of History & Physical:    H&P update referred to the surgeon.     Anesthesia Plan Notes: Labs and EKG ok in epic        Ready For Surgery From Anesthesia Perspective.

## 2020-08-19 ENCOUNTER — OFFICE VISIT (OUTPATIENT)
Dept: UROGYNECOLOGY | Facility: CLINIC | Age: 75
End: 2020-08-19
Payer: COMMERCIAL

## 2020-08-19 VITALS
SYSTOLIC BLOOD PRESSURE: 128 MMHG | HEIGHT: 64 IN | DIASTOLIC BLOOD PRESSURE: 62 MMHG | WEIGHT: 149.25 LBS | BODY MASS INDEX: 25.48 KG/M2

## 2020-08-19 DIAGNOSIS — R39.15 URINARY URGENCY: ICD-10-CM

## 2020-08-19 DIAGNOSIS — N39.46 MIXED INCONTINENCE URGE AND STRESS: Primary | ICD-10-CM

## 2020-08-19 LAB — SARS-COV-2 RNA RESP QL NAA+PROBE: NOT DETECTED

## 2020-08-19 PROCEDURE — 99214 PR OFFICE/OUTPT VISIT, EST, LEVL IV, 30-39 MIN: ICD-10-PCS | Mod: S$GLB,,, | Performed by: OBSTETRICS & GYNECOLOGY

## 2020-08-19 PROCEDURE — 3008F BODY MASS INDEX DOCD: CPT | Mod: CPTII,S$GLB,, | Performed by: OBSTETRICS & GYNECOLOGY

## 2020-08-19 PROCEDURE — 99214 OFFICE O/P EST MOD 30 MIN: CPT | Mod: S$GLB,,, | Performed by: OBSTETRICS & GYNECOLOGY

## 2020-08-19 PROCEDURE — 1126F AMNT PAIN NOTED NONE PRSNT: CPT | Mod: S$GLB,,, | Performed by: OBSTETRICS & GYNECOLOGY

## 2020-08-19 PROCEDURE — 99999 PR PBB SHADOW E&M-EST. PATIENT-LVL IV: ICD-10-PCS | Mod: PBBFAC,,, | Performed by: OBSTETRICS & GYNECOLOGY

## 2020-08-19 PROCEDURE — 99999 PR PBB SHADOW E&M-EST. PATIENT-LVL IV: CPT | Mod: PBBFAC,,, | Performed by: OBSTETRICS & GYNECOLOGY

## 2020-08-19 PROCEDURE — 3008F PR BODY MASS INDEX (BMI) DOCUMENTED: ICD-10-PCS | Mod: CPTII,S$GLB,, | Performed by: OBSTETRICS & GYNECOLOGY

## 2020-08-19 PROCEDURE — 1101F PR PT FALLS ASSESS DOC 0-1 FALLS W/OUT INJ PAST YR: ICD-10-PCS | Mod: CPTII,S$GLB,, | Performed by: OBSTETRICS & GYNECOLOGY

## 2020-08-19 PROCEDURE — 1101F PT FALLS ASSESS-DOCD LE1/YR: CPT | Mod: CPTII,S$GLB,, | Performed by: OBSTETRICS & GYNECOLOGY

## 2020-08-19 PROCEDURE — 1159F PR MEDICATION LIST DOCUMENTED IN MEDICAL RECORD: ICD-10-PCS | Mod: S$GLB,,, | Performed by: OBSTETRICS & GYNECOLOGY

## 2020-08-19 PROCEDURE — 1159F MED LIST DOCD IN RCRD: CPT | Mod: S$GLB,,, | Performed by: OBSTETRICS & GYNECOLOGY

## 2020-08-19 PROCEDURE — 1126F PR PAIN SEVERITY QUANTIFIED, NO PAIN PRESENT: ICD-10-PCS | Mod: S$GLB,,, | Performed by: OBSTETRICS & GYNECOLOGY

## 2020-08-19 RX ORDER — ACETAMINOPHEN AND CODEINE PHOSPHATE 300; 30 MG/1; MG/1
1 TABLET ORAL
Qty: 10 TABLET | Refills: 0 | Status: SHIPPED | OUTPATIENT
Start: 2020-08-19 | End: 2020-08-29

## 2020-08-19 RX ORDER — DOCUSATE SODIUM 100 MG/1
100 CAPSULE, LIQUID FILLED ORAL 2 TIMES DAILY
Qty: 60 CAPSULE | Refills: 11 | Status: SHIPPED | OUTPATIENT
Start: 2020-08-19 | End: 2020-09-18

## 2020-08-19 RX ORDER — IBUPROFEN 600 MG/1
600 TABLET ORAL EVERY 6 HOURS PRN
Qty: 30 TABLET | Refills: 1 | Status: SHIPPED | OUTPATIENT
Start: 2020-08-19 | End: 2023-12-06 | Stop reason: ALTCHOICE

## 2020-08-20 ENCOUNTER — TELEPHONE (OUTPATIENT)
Dept: UROGYNECOLOGY | Facility: CLINIC | Age: 75
End: 2020-08-20

## 2020-08-20 NOTE — TELEPHONE ENCOUNTER
Spoke with pt ans confirmed her 7:30 am arrival time for surgery on tomorrow she voiced understanding and call was ended.

## 2020-08-21 ENCOUNTER — HOSPITAL ENCOUNTER (OUTPATIENT)
Facility: OTHER | Age: 75
Discharge: HOME | End: 2020-08-21
Attending: OBSTETRICS & GYNECOLOGY | Admitting: OBSTETRICS & GYNECOLOGY
Payer: COMMERCIAL

## 2020-08-21 ENCOUNTER — ANESTHESIA (OUTPATIENT)
Dept: SURGERY | Facility: OTHER | Age: 75
End: 2020-08-21
Payer: MEDICARE

## 2020-08-21 VITALS
HEART RATE: 65 BPM | OXYGEN SATURATION: 100 % | BODY MASS INDEX: 25.48 KG/M2 | DIASTOLIC BLOOD PRESSURE: 61 MMHG | RESPIRATION RATE: 16 BRPM | HEIGHT: 64 IN | TEMPERATURE: 98 F | WEIGHT: 149.25 LBS | SYSTOLIC BLOOD PRESSURE: 112 MMHG

## 2020-08-21 DIAGNOSIS — N39.46 MIXED INCONTINENCE: ICD-10-CM

## 2020-08-21 DIAGNOSIS — Z98.890 STATUS POST CREATION OF URETHRAL SLING BY SUPRAPUBIC APPROACH: Primary | ICD-10-CM

## 2020-08-21 DIAGNOSIS — N39.46 MIXED INCONTINENCE URGE AND STRESS: ICD-10-CM

## 2020-08-21 PROCEDURE — C1771 REP DEV, URINARY, W/SLING: HCPCS | Performed by: OBSTETRICS & GYNECOLOGY

## 2020-08-21 PROCEDURE — S0020 INJECTION, BUPIVICAINE HYDRO: HCPCS | Mod: HCNC | Performed by: OBSTETRICS & GYNECOLOGY

## 2020-08-21 PROCEDURE — 71000015 HC POSTOP RECOV 1ST HR: Performed by: OBSTETRICS & GYNECOLOGY

## 2020-08-21 PROCEDURE — 37000008 HC ANESTHESIA 1ST 15 MINUTES: Performed by: OBSTETRICS & GYNECOLOGY

## 2020-08-21 PROCEDURE — 71000033 HC RECOVERY, INTIAL HOUR: Performed by: OBSTETRICS & GYNECOLOGY

## 2020-08-21 PROCEDURE — 63600175 PHARM REV CODE 636 W HCPCS: Mod: HCNC | Performed by: NURSE ANESTHETIST, CERTIFIED REGISTERED

## 2020-08-21 PROCEDURE — 63600175 PHARM REV CODE 636 W HCPCS: Mod: HCNC | Performed by: ANESTHESIOLOGY

## 2020-08-21 PROCEDURE — 25000003 PHARM REV CODE 250: Performed by: ANESTHESIOLOGY

## 2020-08-21 PROCEDURE — 36000707: Performed by: OBSTETRICS & GYNECOLOGY

## 2020-08-21 PROCEDURE — 71000016 HC POSTOP RECOV ADDL HR: Performed by: OBSTETRICS & GYNECOLOGY

## 2020-08-21 PROCEDURE — 57288 PR SLING OPER STRES INCONTINENCE: ICD-10-PCS | Mod: AS,,, | Performed by: PHYSICIAN ASSISTANT

## 2020-08-21 PROCEDURE — 63600175 PHARM REV CODE 636 W HCPCS: Performed by: ANESTHESIOLOGY

## 2020-08-21 PROCEDURE — 25000003 PHARM REV CODE 250: Mod: HCNC | Performed by: OBSTETRICS & GYNECOLOGY

## 2020-08-21 PROCEDURE — 63600175 PHARM REV CODE 636 W HCPCS: Mod: HCNC | Performed by: OBSTETRICS & GYNECOLOGY

## 2020-08-21 PROCEDURE — 57288 REPAIR BLADDER DEFECT: CPT | Mod: ,,, | Performed by: OBSTETRICS & GYNECOLOGY

## 2020-08-21 PROCEDURE — 57288 REPAIR BLADDER DEFECT: CPT | Mod: AS,,, | Performed by: PHYSICIAN ASSISTANT

## 2020-08-21 PROCEDURE — 25000003 PHARM REV CODE 250: Performed by: NURSE ANESTHETIST, CERTIFIED REGISTERED

## 2020-08-21 PROCEDURE — 37000009 HC ANESTHESIA EA ADD 15 MINS: Performed by: OBSTETRICS & GYNECOLOGY

## 2020-08-21 PROCEDURE — C1729 CATH, DRAINAGE: HCPCS | Performed by: OBSTETRICS & GYNECOLOGY

## 2020-08-21 PROCEDURE — 36000706: Performed by: OBSTETRICS & GYNECOLOGY

## 2020-08-21 PROCEDURE — 25000003 PHARM REV CODE 250: Mod: HCNC | Performed by: ANESTHESIOLOGY

## 2020-08-21 PROCEDURE — 57288 PR SLING OPER STRES INCONTINENCE: ICD-10-PCS | Mod: ,,, | Performed by: OBSTETRICS & GYNECOLOGY

## 2020-08-21 PROCEDURE — 71000039 HC RECOVERY, EACH ADD'L HOUR: Performed by: OBSTETRICS & GYNECOLOGY

## 2020-08-21 DEVICE — SLING FIT ADVANTAGE: Type: IMPLANTABLE DEVICE | Site: BLADDER | Status: FUNCTIONAL

## 2020-08-21 RX ORDER — EPHEDRINE SULFATE 50 MG/ML
INJECTION, SOLUTION INTRAVENOUS
Status: DISCONTINUED | OUTPATIENT
Start: 2020-08-21 | End: 2020-08-21

## 2020-08-21 RX ORDER — GLYCOPYRROLATE 0.2 MG/ML
INJECTION INTRAMUSCULAR; INTRAVENOUS
Status: DISCONTINUED | OUTPATIENT
Start: 2020-08-21 | End: 2020-08-21

## 2020-08-21 RX ORDER — ONDANSETRON 8 MG/1
8 TABLET, ORALLY DISINTEGRATING ORAL EVERY 8 HOURS PRN
Status: DISCONTINUED | OUTPATIENT
Start: 2020-08-21 | End: 2020-08-21 | Stop reason: HOSPADM

## 2020-08-21 RX ORDER — SODIUM CHLORIDE, SODIUM LACTATE, POTASSIUM CHLORIDE, CALCIUM CHLORIDE 600; 310; 30; 20 MG/100ML; MG/100ML; MG/100ML; MG/100ML
INJECTION, SOLUTION INTRAVENOUS CONTINUOUS
Status: DISCONTINUED | OUTPATIENT
Start: 2020-08-21 | End: 2020-08-21 | Stop reason: HOSPADM

## 2020-08-21 RX ORDER — CEFAZOLIN SODIUM 1 G/50ML
2 SOLUTION INTRAVENOUS
Status: COMPLETED | OUTPATIENT
Start: 2020-08-21 | End: 2020-08-21

## 2020-08-21 RX ORDER — DIPHENHYDRAMINE HCL 25 MG
25 CAPSULE ORAL EVERY 4 HOURS PRN
Status: DISCONTINUED | OUTPATIENT
Start: 2020-08-21 | End: 2020-08-21 | Stop reason: HOSPADM

## 2020-08-21 RX ORDER — HYDROCODONE BITARTRATE AND ACETAMINOPHEN 5; 325 MG/1; MG/1
1 TABLET ORAL EVERY 4 HOURS PRN
Status: CANCELLED | OUTPATIENT
Start: 2020-08-21

## 2020-08-21 RX ORDER — OXYCODONE HYDROCHLORIDE 5 MG/1
5 TABLET ORAL
Status: DISCONTINUED | OUTPATIENT
Start: 2020-08-21 | End: 2020-08-21 | Stop reason: HOSPADM

## 2020-08-21 RX ORDER — MUPIROCIN 20 MG/G
OINTMENT TOPICAL
Status: DISCONTINUED | OUTPATIENT
Start: 2020-08-21 | End: 2020-08-21 | Stop reason: HOSPADM

## 2020-08-21 RX ORDER — VASOPRESSIN 20 [USP'U]/ML
INJECTION, SOLUTION INTRAMUSCULAR; SUBCUTANEOUS
Status: DISCONTINUED | OUTPATIENT
Start: 2020-08-21 | End: 2020-08-21 | Stop reason: HOSPADM

## 2020-08-21 RX ORDER — OXYCODONE AND ACETAMINOPHEN 5; 325 MG/1; MG/1
1 TABLET ORAL EVERY 4 HOURS PRN
Qty: 14 TABLET | Refills: 0 | Status: SHIPPED | OUTPATIENT
Start: 2020-08-21 | End: 2020-09-14

## 2020-08-21 RX ORDER — CELECOXIB 200 MG/1
400 CAPSULE ORAL
Status: COMPLETED | OUTPATIENT
Start: 2020-08-21 | End: 2020-08-21

## 2020-08-21 RX ORDER — DIPHENHYDRAMINE HYDROCHLORIDE 50 MG/ML
25 INJECTION INTRAMUSCULAR; INTRAVENOUS EVERY 4 HOURS PRN
Status: DISCONTINUED | OUTPATIENT
Start: 2020-08-21 | End: 2020-08-21 | Stop reason: HOSPADM

## 2020-08-21 RX ORDER — LIDOCAINE HYDROCHLORIDE 10 MG/ML
0.5 INJECTION, SOLUTION EPIDURAL; INFILTRATION; INTRACAUDAL; PERINEURAL ONCE
Status: DISCONTINUED | OUTPATIENT
Start: 2020-08-21 | End: 2020-08-21 | Stop reason: HOSPADM

## 2020-08-21 RX ORDER — ACETAMINOPHEN 500 MG
1000 TABLET ORAL
Status: COMPLETED | OUTPATIENT
Start: 2020-08-21 | End: 2020-08-21

## 2020-08-21 RX ORDER — FENTANYL CITRATE 50 UG/ML
INJECTION, SOLUTION INTRAMUSCULAR; INTRAVENOUS
Status: DISCONTINUED | OUTPATIENT
Start: 2020-08-21 | End: 2020-08-21

## 2020-08-21 RX ORDER — ONDANSETRON 2 MG/ML
4 INJECTION INTRAMUSCULAR; INTRAVENOUS DAILY PRN
Status: DISCONTINUED | OUTPATIENT
Start: 2020-08-21 | End: 2020-08-21 | Stop reason: HOSPADM

## 2020-08-21 RX ORDER — PROPOFOL 10 MG/ML
VIAL (ML) INTRAVENOUS
Status: DISCONTINUED | OUTPATIENT
Start: 2020-08-21 | End: 2020-08-21

## 2020-08-21 RX ORDER — IBUPROFEN 600 MG/1
600 TABLET ORAL EVERY 6 HOURS PRN
Status: DISCONTINUED | OUTPATIENT
Start: 2020-08-21 | End: 2020-08-21 | Stop reason: HOSPADM

## 2020-08-21 RX ORDER — LIDOCAINE HYDROCHLORIDE 20 MG/ML
INJECTION INTRAVENOUS
Status: DISCONTINUED | OUTPATIENT
Start: 2020-08-21 | End: 2020-08-21

## 2020-08-21 RX ORDER — SODIUM CHLORIDE 9 MG/ML
INJECTION, SOLUTION INTRAVENOUS CONTINUOUS
Status: DISCONTINUED | OUTPATIENT
Start: 2020-08-21 | End: 2020-08-21 | Stop reason: HOSPADM

## 2020-08-21 RX ORDER — HYDROCODONE BITARTRATE AND ACETAMINOPHEN 10; 325 MG/1; MG/1
1 TABLET ORAL EVERY 4 HOURS PRN
Status: CANCELLED | OUTPATIENT
Start: 2020-08-21

## 2020-08-21 RX ORDER — SODIUM CHLORIDE 0.9 % (FLUSH) 0.9 %
3 SYRINGE (ML) INJECTION
Status: DISCONTINUED | OUTPATIENT
Start: 2020-08-21 | End: 2020-08-21 | Stop reason: HOSPADM

## 2020-08-21 RX ORDER — BUPIVACAINE HYDROCHLORIDE 5 MG/ML
INJECTION, SOLUTION EPIDURAL; INTRACAUDAL
Status: DISCONTINUED | OUTPATIENT
Start: 2020-08-21 | End: 2020-08-21 | Stop reason: HOSPADM

## 2020-08-21 RX ORDER — IBUPROFEN 800 MG/1
800 TABLET ORAL EVERY 8 HOURS PRN
Qty: 30 TABLET | Refills: 1 | Status: SHIPPED | OUTPATIENT
Start: 2020-08-21 | End: 2020-09-14

## 2020-08-21 RX ORDER — HYDROMORPHONE HYDROCHLORIDE 2 MG/ML
0.4 INJECTION, SOLUTION INTRAMUSCULAR; INTRAVENOUS; SUBCUTANEOUS EVERY 5 MIN PRN
Status: DISCONTINUED | OUTPATIENT
Start: 2020-08-21 | End: 2020-08-21 | Stop reason: HOSPADM

## 2020-08-21 RX ORDER — MEPERIDINE HYDROCHLORIDE 25 MG/ML
12.5 INJECTION INTRAMUSCULAR; INTRAVENOUS; SUBCUTANEOUS ONCE AS NEEDED
Status: DISCONTINUED | OUTPATIENT
Start: 2020-08-21 | End: 2020-08-21 | Stop reason: HOSPADM

## 2020-08-21 RX ADMIN — CEFAZOLIN SODIUM 1 G: 1 SOLUTION INTRAVENOUS at 10:08

## 2020-08-21 RX ADMIN — ACETAMINOPHEN 1000 MG: 500 TABLET, FILM COATED ORAL at 07:08

## 2020-08-21 RX ADMIN — CARBOXYMETHYLCELLULOSE SODIUM 2 DROP: 2.5 SOLUTION/ DROPS OPHTHALMIC at 10:08

## 2020-08-21 RX ADMIN — ONDANSETRON HYDROCHLORIDE 4 MG: 2 INJECTION INTRAMUSCULAR; INTRAVENOUS at 11:08

## 2020-08-21 RX ADMIN — MUPIROCIN: 20 OINTMENT TOPICAL at 07:08

## 2020-08-21 RX ADMIN — SODIUM CHLORIDE, SODIUM LACTATE, POTASSIUM CHLORIDE, AND CALCIUM CHLORIDE: 600; 310; 30; 20 INJECTION, SOLUTION INTRAVENOUS at 09:08

## 2020-08-21 RX ADMIN — GLYCOPYRROLATE 0.2 MG: 0.2 INJECTION, SOLUTION INTRAMUSCULAR; INTRAVENOUS at 10:08

## 2020-08-21 RX ADMIN — FENTANYL CITRATE 50 MCG: 50 INJECTION, SOLUTION INTRAMUSCULAR; INTRAVENOUS at 10:08

## 2020-08-21 RX ADMIN — EPHEDRINE SULFATE 10 MG: 50 INJECTION INTRAVENOUS at 10:08

## 2020-08-21 RX ADMIN — CELECOXIB 400 MG: 200 CAPSULE ORAL at 07:08

## 2020-08-21 RX ADMIN — PROPOFOL 160 MG: 10 INJECTION, EMULSION INTRAVENOUS at 10:08

## 2020-08-21 RX ADMIN — LIDOCAINE HYDROCHLORIDE 50 MG: 20 INJECTION, SOLUTION INTRAVENOUS at 10:08

## 2020-08-21 RX ADMIN — OXYCODONE HYDROCHLORIDE 5 MG: 5 TABLET ORAL at 12:08

## 2020-08-21 NOTE — TRANSFER OF CARE
"Anesthesia Transfer of Care Note    Patient: Sury Gao    Procedure(s) Performed: Procedure(s) (LRB):  CYSTOSCOPY  SLING, RETROPUBIC     (N/A)    Patient location: PACU    Anesthesia Type: general    Transport from OR: Transported from OR on 2-3 L/min O2 by NC with adequate spontaneous ventilation    Post pain: adequate analgesia    Post assessment: no apparent anesthetic complications    Post vital signs: stable    Level of consciousness: awake, alert and oriented    Nausea/Vomiting: no nausea/vomiting    Complications: none    Transfer of care protocol was followed      Last vitals:   Visit Vitals  /67 (BP Location: Right arm, Patient Position: Lying)   Pulse (!) 54   Temp 36.6 °C (97.9 °F) (Skin)   Resp 16   Ht 5' 4" (1.626 m)   Wt 67.7 kg (149 lb 4 oz)   SpO2 (!) 94%   Breastfeeding No   BMI 25.62 kg/m²     "

## 2020-08-21 NOTE — OR NURSING
Packing removed; Patel disconnected; 300ml sterile water instilled into bladder.  Patel removed.  Patient has 30 minutes to void 200ml.

## 2020-08-21 NOTE — ANESTHESIA POSTPROCEDURE EVALUATION
Anesthesia Post Evaluation    Patient: Sury Gao    Procedure(s) Performed: Procedure(s) (LRB):  CYSTOSCOPY  SLING, RETROPUBIC     (N/A)    Final Anesthesia Type: general    Patient location during evaluation: PACU  Patient participation: Yes- Able to Participate  Level of consciousness: awake and alert  Post-procedure vital signs: reviewed and stable  Pain management: adequate  Airway patency: patent  FRANCIE mitigation strategies: Extubation while patient is awake  PONV status at discharge: No PONV  Anesthetic complications: no      Cardiovascular status: hemodynamically stable  Respiratory status: unassisted  Hydration status: euvolemic  Follow-up not needed.          Vitals Value Taken Time   /60 08/21/20 1239   Temp 36.3 °C (97.4 °F) 08/21/20 1150   Pulse 56 08/21/20 1251   Resp 16 08/21/20 1230   SpO2 94 % 08/21/20 1251   Vitals shown include unvalidated device data.      Event Time   Out of Recovery 12:52:19         Pain/Kayla Score: Pain Rating Prior to Med Admin: 4 (8/21/2020 12:22 PM)  Pain Rating Post Med Admin: 1 (8/21/2020 12:45 PM)  Kayla Score: 10 (8/21/2020 12:45 PM)

## 2020-08-21 NOTE — H&P
[facility]  Obstetrics & Gynecology  History & Physical    Patient Name: Sury Gao  MRN: [id number]  Admission Date: 2020  Primary Care Provider: Jann Maurer MD (Inactive)    Subjective:     Chief Complaint/Reason for Admission: Mixed Urinary Incontinence    History of Present Illness:  Sury Gao is a 74 y.o. presents for scheduled surgical placement of MUS for treatment of stress urinary incontinence. She has a past medical history of Hypothyroidism, Nuclear sclerosis (2014), and Thyroid disease.  She is well known to the clinic. She recently returned to clinic with bothersome urinary urgency and incontinence.  History can be seen below.         2018  PROCEDURE DATE:  2018     1. Total Laparoscopic Hysterectomy  2. Bilateral salpingectomy-oophorectomy  3. Laparoscopic sacrocolpopexy with permanent mesh   4. Cystoscopy      Urinary urgency and incontinence in the past month, very bothersome . Walks in the morning before she has coffee but there is no leakage. When she walks in the afternoon she notes increase urinary urgency, leakage and generalized bladder discomfort       Interval  HP since the last visit   1)  UI:  (+) VLADIMIR: daily  (+) UUI  - several times a day, associated leakage 3-5 times a week.  (+) pads:1-2.  Daytime frequency: Q 4 -5 hours.  Nocturia: No:    (--) dysuria-  (--) hematuria,  (--) frequent UTIs.  (+) complete bladder emptying.      2)  POP:     Symptoms:(--)  .  (--) vaginal bleeding. (--) vaginal discharge. (+) sexually active: no penetration.  (--) dyspareunia.   (--)  Vaginal dryness.  (--) vaginal estrogen use.      3)  BM:  (+) constipation/straining.  (--) chronic diarrhea. (--) hematochezia.  (--) fecal incontinence.  (--) fecal smearing/urgency.  (--) incomplete evacuation.         OB History    Para Term  AB Living   4 3 3 0 1 3   SAB TAB Ectopic Multiple Live Births   1 0 0 0 3      # Outcome Date GA  Lbr Bertin/2nd Weight Sex Delivery Anes PTL Lv   4 SAB            3 Term            2 Term            1 Term              Past Medical History:   Diagnosis Date    Hypothyroidism     Nuclear sclerosis 4/23/2014    Thyroid disease     hypothyroid     Past Surgical History:   Procedure Laterality Date    Bilateral lower extremity varicose vein treatment       COLONOSCOPY N/A 2/23/2018    DILATION AND CURETTAGE OF UTERUS      HIP REPLACEMENT ARTHROPLASTY Left     HYSTERECTOMY  04/09/2018    TLHBSO with Sacrocolpopexy for prolapse    gutierrez neuroma      left foot    MINI-LAPAROTOMY      D&C perforation    OOPHORECTOMY  04/09/2018    TONSILLECTOMY      WRIST SURGERY      left wrist: Swave-Kapunjay surgery       PTA Medications   Medication Sig    aspirin (ECOTRIN) 81 MG EC tablet Take 81 mg by mouth once daily.    calcium carbonate (OS-SUHAIL) 600 mg calcium (1,500 mg) Tab Take 1,200 mg by mouth once daily.    cholecalciferol, vitamin D3, (VITAMIN D3) 25 mcg (1,000 unit) capsule Take 1,000 Units by mouth once daily.    estradioL (ESTRACE) 0.5 MG tablet Take 1 tablet (0.5 mg total) by mouth once daily.    ibuprofen (ADVIL,MOTRIN) 600 MG tablet Take 1 tablet (600 mg total) by mouth every 6 (six) hours as needed for Pain.    loratadine (CLARITIN) 10 mg tablet Take 10 mg by mouth daily as needed.    multivit,iron,minerals/lutein (CENTRUM SILVER ULTRA WOMEN'S ORAL) Take 1 tablet by mouth once daily.    rosuvastatin (CRESTOR) 10 MG tablet Take 1 tablet (10 mg total) by mouth every evening. (Patient taking differently: Take 10 mg by mouth once daily. )    SYNTHROID 75 mcg tablet TAKE 1 TABLET BY MOUTH DAILY    vitC/E/Zn/copper/lutein/zeaxan (ICAPS AREDS2 ORAL) Take 2 capsules by mouth once daily.    acetaminophen-codeine 300-30mg (TYLENOL #3) 300-30 mg Tab Take 1 tablet by mouth every 4 to 6 hours as needed.    docusate sodium (COLACE) 100 MG capsule Take 1 capsule (100 mg total) by mouth 2 (two) times  daily.    fexofenadine (ALLEGRA) 60 MG tablet Take 60 mg by mouth as needed.    furosemide (LASIX) 40 MG tablet Take 1 tablet (40 mg total) by mouth daily as needed (swelling).    mirabegron (MYRBETRIQ) 25 mg Tb24 ER tablet Take 1 tablet (25 mg total) by mouth once daily.    [DISCONTINUED] UNABLE TO FIND Hair, Skin and nails       Review of patient's allergies indicates:  No Known Allergies     Family History     Problem Relation (Age of Onset)    Cancer Brother    Cataracts Mother, Father    Heart disease Mother    Hypothyroidism Mother, Sister, Daughter    Macular degeneration Father        Tobacco Use    Smoking status: Former Smoker     Packs/day: 0.25     Years: 10.00     Pack years: 2.50     Quit date: 6/10/1976     Years since quittin.2    Smokeless tobacco: Former User   Substance and Sexual Activity    Alcohol use: Yes     Comment: social    Drug use: No    Sexual activity: Yes     Review of Systems   Constitutional: Negative for chills and fever.   Cardiovascular: Negative for chest pain.   Gastrointestinal: Negative for abdominal pain.   Endocrine: Negative.    Genitourinary: Positive for bladder incontinence.   Musculoskeletal: Negative for back pain.   Integumentary:  Negative for rash.   Neurological: Negative.    Psychiatric/Behavioral: Negative.    Breast: negative.       Objective:     Vital Signs (Most Recent):  Temp: 97.9 °F (36.6 °C) (20 0809)  Pulse: (!) 54 (20 0809)  Resp: 16 (20 0809)  BP: 102/67 (20 0809)  SpO2: (!) 94 % (20 08) Vital Signs (24h Range):  Temp:  [97.9 °F (36.6 °C)] 97.9 °F (36.6 °C)  Pulse:  [54] 54  Resp:  [16] 16  SpO2:  [94 %] 94 %  BP: (102)/(67) 102/67     Weight: 67.7 kg (149 lb 4 oz)  Body mass index is 25.62 kg/m².    No LMP recorded. Patient has had a hysterectomy.    Physical Exam:   Constitutional: She is oriented to person, place, and time. She appears well-developed and well-nourished. No distress.    HENT:   Head:  Normocephalic and atraumatic.    Eyes: Conjunctivae are normal.    Neck: Neck supple.    Cardiovascular: Normal rate, regular rhythm and intact distal pulses.     Pulmonary/Chest: Effort normal. No respiratory distress.        Abdominal: Soft. She exhibits no distension. There is no rebound and no guarding.     Genitourinary:    Genitourinary Comments: Uterus absent.              Musculoskeletal: Normal range of motion and moves all extremeties.       Neurological: She is alert and oriented to person, place, and time.    Skin: Skin is warm and dry. She is not diaphoretic.    Psychiatric: She has a normal mood and affect. Her behavior is normal. Judgment and thought content normal.         Assessment/Plan:     Mixed incontinence urge and stress  - To OR for scheduled MUS for treatment of stress urinary incontinence        Racheal Bucio MD  Obstetrics & Gynecology  [facility]

## 2020-08-21 NOTE — DISCHARGE INSTRUCTIONS
Anesthesia: After Your Surgery  Youve just had surgery. During surgery, you received medication called anesthesia to keep you comfortable and pain-free. After surgery, you may experience some pain or nausea. This is common. Here are some tips for feeling better and recovering after surgery.    Going home  Your doctor or nurse will show you how to take care of yourself when you go home. He or she will also answer your questions. Have an adult family member or friend drive you home. For the first 24 hours after your surgery:  · Do not drive or use heavy equipment.  · Do not make important decisions or sign legal documents.  · Avoid alcohol.  · Have someone stay with you, if needed. He or she can watch for problems and help keep you safe.  · Take your time getting up from a seated or lying position. You may experience dizziness for 24 hours  Be sure to keep all follow-up appointments with your doctor. And rest after your procedure for as long as your doctor tells you to.    Coping with pain  If you have pain after surgery, pain medication will help you feel better. Take it as directed, before pain becomes severe. Also, ask your doctor or pharmacist about other ways to control pain, such as with heat, ice, and relaxation. And follow any other instructions your surgeon or nurse gives you.    URINARY RETENTION  Should you experience a decrease in your urine output or are unable to urinate following surgery, this can be due to the medications given during surgery.  We recommend you going to the nearest Emergency Department.    Tips for taking pain medication  To get the best relief possible, remember these points:  · Pain medications can upset your stomach. Taking them with a little food may help.  · Most pain relievers taken by mouth need at least 20 to 30 minutes to take effect.  · Taking medication on a schedule can help you remember to take it. Try to time your medication so that you can take it before beginning an  activity, such as dressing, walking, or sitting down for dinner.  · Constipation is a common side effect of pain medications. Contact your doctor before taking any medications like laxatives or stool softeners to help relieve constipation. Also ask about any dietary restrictions, because drinking lots of fluids and eating foods like fruits and vegetables that are high in fiber can also help. Remember, dont take laxatives unless your surgeon has prescribed them.  · Mixing alcohol and pain medication can cause dizziness and slow your breathing. It can even be fatal. Dont drink alcohol while taking pain medication.  · Pain medication can slow your reflexes. Dont drive or operate machinery while taking pain medication.  If your health care provider tells you to take acetaminophen to help relieve your pain, ask him or her how much you are supposed to take each day. (Acetaminophen is the generic name for Tylenol and other brand-name pain relievers.) Acetaminophen or other pain relievers may interact with your prescription medicines or other over-the-counter (OTC) drugs. Some prescription medications contain acetaminophen along with other active ingredients. Using both prescription and OTC acetaminophen for pain can cause you to overdose. The FDA recommends that you read the labels on your OTC medications carefully. This will help you to clearly understand the list of active ingredients, dosing instructions, and any warnings. It may also help you avoid taking too much acetaminophen. If you have questions or don't understand the information, ask your pharmacist or health care provider to explain it to you before you take the OTC medication.    Managing nausea  Some people have an upset stomach after surgery. This is often due to anesthesia, pain, pain medications, or the stress of surgery. The following tips will help you manage nausea and get good nutrition as you recover. If you were on a special diet before surgery,  ask your doctor if you should follow it during recovery. These tips may help:  · Dont push yourself to eat. Your body will tell you when to eat and how much.  · Start off with clear liquids and soup. They are easier to digest.  · Progress to semi-solid foods (mashed potatoes, applesauce, and gelatin) as you feel ready.  · Slowly move to solid foods. Dont eat fatty, rich, or spicy foods at first.  · Dont force yourself to have three large meals a day. Instead, eat smaller amounts more often.  · Take pain medications with a small amount of solid food, such as crackers or toast to avoid nausea.      Call your surgeon if    · You feel too sleepy, dizzy, or groggy (medication may be too strong).  · You have side effects like nausea, vomiting, or skin changes (rash, itching, or hives).   © 3172-8861 [employer] [address] [place] [address], [place] [address]. All rights reserved. This information is not intended as a substitute for professional medical care. Always follow your healthcare professional's instructions.    FOLLOW ANY OTHER INSTRUCTIONS GIVEN TO YOU BY DR GODOY

## 2020-08-21 NOTE — OPERATIVE NOTE ADDENDUM
Certification of Assistant at Surgery       Surgery Date: 8/21/2020     Participating Surgeons:  Surgeon(s) and Role:     * Maxx Castellanos DO - Primary     * Racheal Bucio MD - Resident - Assisting    Procedures:  Procedure(s) (LRB):  CYSTOSCOPY  SLING, RETROPUBIC     (N/A)    Assistant Surgeon's Certification of Necessity:  I understand that section 1842 (b) (6) (d) of the Social Security Act generally prohibits Medicare Part B reasonable charge payment for the services of assistants at surgery in [facility] hospitals when qualified residents are available to furnish such services. I certify that the services for which payment is claimed were medically necessary, and that no qualified resident was available to perform the services. I further understand that these services are subject to post-payment review by the Medicare carrier.      Ambrosio Amin PA-C    08/21/2020  1:11 PM

## 2020-08-21 NOTE — SUBJECTIVE & OBJECTIVE
OB History    Para Term  AB Living   4 3 3 0 1 3   SAB TAB Ectopic Multiple Live Births   1 0 0 0 3      # Outcome Date GA Lbr Bertin/2nd Weight Sex Delivery Anes PTL Lv   4 SAB            3 Term            2 Term            1 Term              Past Medical History:   Diagnosis Date    Hypothyroidism     Nuclear sclerosis 2014    Thyroid disease     hypothyroid     Past Surgical History:   Procedure Laterality Date    Bilateral lower extremity varicose vein treatment       COLONOSCOPY N/A 2018    DILATION AND CURETTAGE OF UTERUS      HIP REPLACEMENT ARTHROPLASTY Left     HYSTERECTOMY  2018    TLHBSO with Sacrocolpopexy for prolapse    gutierrez neuroma      left foot    MINI-LAPAROTOMY      D&C perforation    OOPHORECTOMY  2018    TONSILLECTOMY      WRIST SURGERY      left wrist: Swave-Kapunjay surgery       PTA Medications   Medication Sig    aspirin (ECOTRIN) 81 MG EC tablet Take 81 mg by mouth once daily.    calcium carbonate (OS-SUHAIL) 600 mg calcium (1,500 mg) Tab Take 1,200 mg by mouth once daily.    cholecalciferol, vitamin D3, (VITAMIN D3) 25 mcg (1,000 unit) capsule Take 1,000 Units by mouth once daily.    estradioL (ESTRACE) 0.5 MG tablet Take 1 tablet (0.5 mg total) by mouth once daily.    ibuprofen (ADVIL,MOTRIN) 600 MG tablet Take 1 tablet (600 mg total) by mouth every 6 (six) hours as needed for Pain.    loratadine (CLARITIN) 10 mg tablet Take 10 mg by mouth daily as needed.    multivit,iron,minerals/lutein (CENTRUM SILVER ULTRA WOMEN'S ORAL) Take 1 tablet by mouth once daily.    rosuvastatin (CRESTOR) 10 MG tablet Take 1 tablet (10 mg total) by mouth every evening. (Patient taking differently: Take 10 mg by mouth once daily. )    SYNTHROID 75 mcg tablet TAKE 1 TABLET BY MOUTH DAILY    vitC/E/Zn/copper/lutein/zeaxan (ICAPS AREDS2 ORAL) Take 2 capsules by mouth once daily.    acetaminophen-codeine 300-30mg (TYLENOL #3) 300-30 mg Tab Take 1 tablet  by mouth every 4 to 6 hours as needed.    docusate sodium (COLACE) 100 MG capsule Take 1 capsule (100 mg total) by mouth 2 (two) times daily.    fexofenadine (ALLEGRA) 60 MG tablet Take 60 mg by mouth as needed.    furosemide (LASIX) 40 MG tablet Take 1 tablet (40 mg total) by mouth daily as needed (swelling).    mirabegron (MYRBETRIQ) 25 mg Tb24 ER tablet Take 1 tablet (25 mg total) by mouth once daily.    [DISCONTINUED] UNABLE TO FIND Hair, Skin and nails       Review of patient's allergies indicates:  No Known Allergies     Family History     Problem Relation (Age of Onset)    Cancer Brother    Cataracts Mother, Father    Heart disease Mother    Hypothyroidism Mother, Sister, Daughter    Macular degeneration Father        Tobacco Use    Smoking status: Former Smoker     Packs/day: 0.25     Years: 10.00     Pack years: 2.50     Quit date: 6/10/1976     Years since quittin.2    Smokeless tobacco: Former User   Substance and Sexual Activity    Alcohol use: Yes     Comment: social    Drug use: No    Sexual activity: Yes     Review of Systems   Constitutional: Negative for chills and fever.   Cardiovascular: Negative for chest pain.   Gastrointestinal: Negative for abdominal pain.   Endocrine: Negative.    Genitourinary: Positive for bladder incontinence.   Musculoskeletal: Negative for back pain.   Integumentary:  Negative for rash.   Neurological: Negative.    Psychiatric/Behavioral: Negative.    Breast: negative.       Objective:     Vital Signs (Most Recent):  Temp: 97.9 °F (36.6 °C) (20 0809)  Pulse: (!) 54 (20 0809)  Resp: 16 (20 0809)  BP: 102/67 (20 0809)  SpO2: (!) 94 % (20 0809) Vital Signs (24h Range):  Temp:  [97.9 °F (36.6 °C)] 97.9 °F (36.6 °C)  Pulse:  [54] 54  Resp:  [16] 16  SpO2:  [94 %] 94 %  BP: (102)/(67) 102/67     Weight: 67.7 kg (149 lb 4 oz)  Body mass index is 25.62 kg/m².    No LMP recorded. Patient has had a hysterectomy.    Physical Exam:    Constitutional: She is oriented to person, place, and time. She appears well-developed and well-nourished. No distress.    HENT:   Head: Normocephalic and atraumatic.    Eyes: Conjunctivae are normal.    Neck: Neck supple.    Cardiovascular: Normal rate, regular rhythm and intact distal pulses.     Pulmonary/Chest: Effort normal. No respiratory distress.        Abdominal: Soft. She exhibits no distension. There is no rebound and no guarding.     Genitourinary:    Genitourinary Comments: Uterus absent.              Musculoskeletal: Normal range of motion and moves all extremeties.       Neurological: She is alert and oriented to person, place, and time.    Skin: Skin is warm and dry. She is not diaphoretic.    Psychiatric: She has a normal mood and affect. Her behavior is normal. Judgment and thought content normal.

## 2020-08-21 NOTE — OR NURSING
Patient passed voiding trial.   She is getting dressed and waiting on pharmacy.  She has met all discharge criteria.

## 2020-08-21 NOTE — ANESTHESIA PROCEDURE NOTES
Intubation  Performed by: Wilbur Valle CRNA  Authorized by: Booker Cole MD     Intubation:     Induction:  Intravenous    Intubated:  Postinduction    Mask Ventilation:  Easy mask    Attempts:  1    Attempted By:  CRNA    Method of Intubation:  Direct    Difficult Airway Encountered?: No      Complications:  None    Airway Device:  Supraglottic airway/LMA    Airway Device Size:  4.0    Style/Cuff Inflation:  Cuffed    Inflation Amount (mL):  9    Secured at:  The lips    Placement Verified By:  Capnometry    Complicating Factors:  None    Findings Post-Intubation:  BS equal bilateral

## 2020-08-21 NOTE — OP NOTE
THIS IS A CONFIDENTIAL AND PRIVILEGED COMMUNICATION  PLEASE DISPOSE OF PAPER COPIES APPROPRIATELY  [facility] [place], [place]   OPERATIVE REPORT          PATIENT NAME: Sury Gao    MEDICAL RECORD NUMBER: [id number]    PROCEDURE DATE: 08/21/2020    PROCEDURE:     1.  Retropubic Sling placement  ([employer] - REF: [id number]   Lot# [id number])    2. Cystourethroscopy     ATTENDING SURGEON: Maxx Castellanos DO    ASSISTANT:  Ambrosio BOWEN- No qualified resident available  Racheal Bucio PGY 4     PREOPERATIVE DIAGNOSIS:   1.  Mixed incontinence (geniune stress incontinence noted office CMG)    POSTOPERATIVE DIAGNOSIS: same     ANESTHESIA: GET    WOUND CLASSIFICATION: Clean-Contaminated    ANTIBIOTIC PROPHYLAXIS: Kefzol    FINDINGS:  Vaginal apex well suspended.  Anterior and posterior vaginal wall well suspended. Normal cystourethroscopy with bilateral flow of urine from both ureteral jets.      SPECIMENS SENT:  None     COMPLICATIONS: None     DRAINS: Patel, UOP- 300    ESTIMATED BLOOD LOSS: 25 mL    IV FLUIDS:800 mL    POSTOP CONDITION: stable    INDICATION: Sury Gao is a 74 y.o. presents for scheduled surgical placement of MUS for treatment of stress urinary incontinence. She has a past medical history of Hypothyroidism, Nuclear sclerosis (4/23/2014), and Thyroid disease.She is well known to the clinic. She recently returned to clinic with bothersome urinary urgency and incontinence.  History can be seen below.         04/09/2018  PROCEDURE DATE:  04/09/2018     1. Total Laparoscopic Hysterectomy  2. Bilateral salpingectomy-oophorectomy  3. Laparoscopic sacrocolpopexy with permanent mesh   4. Cystoscopy      Urinary urgency and incontinence in the past month, very bothersome . Walks in the morning before she has coffee but there is no leakage. When she walks in the afternoon she notes increase urinary urgency, leakage and generalized bladder discomfort. She  leaks with increased intraabdominal pressure.      Interval  HP since the last visit   1)  UI:  (+) VLADIMIR: daily  (+) UUI  - several times a day, associated leakage 3-5 times a week.  (+) pads:1-2.  Daytime frequency: Q 4 -5 hours.  Nocturia: No:    (--) dysuria-  (--) hematuria,  (--) frequent UTIs.  (+) complete bladder emptying.      2)  POP:     Symptoms:(--)  .  (--) vaginal bleeding. (--) vaginal discharge. (+) sexually active: no penetration.  (--) dyspareunia.   (--)  Vaginal dryness.  (--) vaginal estrogen use.      3)  BM:  (+) constipation/straining.  (--) chronic diarrhea. (--) hematochezia.  (--) fecal incontinence.  (--) fecal smearing/urgency.  (--) incomplete evacuation.     PROCEDURE:    The patient was identified in the preoperative area where informed consent was confirmed, and she was taken to the operating room where an adequate level of general anesthesia was obtained. The patient was positioned in lithotomy position with legs in  Yellow Fin stirrups. Care was taken to avoid joint hyperflexion or hyperextension, and all extremity surfaces were carefully padded so as to minimize risk of neurologic injury. Intravenous antibiotics were administered preoperatively. Sequential compression devices were applied to the patient's lower extremities preoperatively VTE prophylaxis. Surgical time-out was performed, where the patient was identified and procedures confirmed. An examination under anesthesia was performed with findings described as above. The patient's abdomen, perineum, and vagina were sterilely prepped and draped. A ayala catheter was placed in the bladder for drainage.     The retropubic sling was then placed.  Allis clamp placed 1 cm proximal to the urethra along the midline of the anterior vaginal wall. A second Allis clamp was placed 1 cm proximal to the vaginal apex. A vertical incision was made in the midline of the anterior vaginal wall between the 2 Allis clamps with the scalpel. Using  the metzenbaum scissors, tracts were made lateral to the urethra to the ischiopubic ramus bilaterally to allow for placement of the sling. The [employer]  Advantage fit  sling device was then assembled. 2 stab incisions were made with the scalpel in the skin at the superior margin of the pubic bone 2 cm lateral to the midline. The first trocar was inserted lateral to the urethra on the right side. The trocar was passed behind the pubic symphysis through the retropubic space and exited through the abdominal incision sites. In a similar manner, the second trocar was introduced on the left side and passed behind the pubic symphysis through the retropubic space and exited through the abdominal incision made previously. The catheter was removed and cystocospy was undertaken which showed normal bladder mucosa without foreign objects, bilateral ureteral jets and normal urethra on antegrade urethroscopy. The ayala was replaced. The sling was appropriately placed without tension, the blue centering tab was cut and released and the plastic sheaths deployed. The right angle clamp was kept in place to allow for tension free placement of the mesh.  The suprapubic skin was pushed down and the curved Harris's used to cut the excess mesh. The suprapubic skin edges were re-approximated with Dermabond.  The vaginal epithelium was re-aproximated with 2.0 Vicryl in a running fashion.     The patient tolerated the procedure well. Needle, sponge lap, and instrument counts were correct x2.  The vaginal was packed with guaze. The patient was transferred to recovery in stable condition.    Dr. Maxx Castellanos was present and participated in the entire procedure.      DICTATED BY Maxx Castellanos, DO    _________________________

## 2020-08-21 NOTE — PLAN OF CARE
Sury Gao has met all discharge criteria from Phase II. Vital Signs are stable, ambulating  without difficulty.Pain is now under control and tolerable for the pt. Pain score2 at this time.  Discharge instructions given, patient verbalized understanding. Discharged from facility via wheelchair in stable condition.

## 2020-08-21 NOTE — DISCHARGE SUMMARY
[facility]  Brief Op Note/Discharge Note  Short Stay    Admit Date: 8/21/2020    Discharge Date: 08/21/2020    Attending Physician: Maxx Cordova    Surgery Date: 8/21/2020     Surgeon(s) and Role:     * Maxx Castellanos, DO - Primary     * Racheal Bucio MD - Resident - Assisting    Pre-op Diagnosis:  Mixed incontinence [N39.46]    Post-op Diagnosis:  Post-Op Diagnosis Codes:     * Mixed incontinence [N39.46]     * S/P MUS (Retropubic) with Cystoscopy    Procedure(s) (LRB):  CYSTOSCOPY  SLING, RETROPUBIC     (N/A)    Anesthesia: General    Findings/Key Components: See Operative Report    Estimated Blood Loss: 25 mL         Specimens:   Specimen (12h ago, onward)    None          Discharge Provider: Racheal Bucio    Diagnoses:  Active Hospital Problems    Diagnosis  POA    Mixed incontinence urge and stress [N39.46]  Yes    S/P Retropubic MUS [Z98.890]  Not Applicable      Resolved Hospital Problems   No resolved problems to display.       Discharged Condition: good    Hospital Course:   Patient was admitted for outpatient procedure as above, and tolerated the procedure well with no complications. Please see operative report for further details. Following the procedure, the patient was awakened from anesthesia and transferred to the recovery area in stable condition. She was discharged to home once ambulating, voiding, tolerating PO intake, and pain was well-controlled. Patient was given routine post-op instructions and prescriptions for pain medication to take as needed. Patient instructed to follow up with Dr. Cordova as scheduled.     Final Diagnoses: Same as principal problem.    Disposition: Home or Self Care    Follow up/Patient Instructions:    Medications:  Reconciled Home Medications:      Medication List      START taking these medications    oxyCODONE-acetaminophen 5-325 mg per tablet  Commonly known as: PERCOCET  Take 1 tablet by mouth every 4 (four) hours as needed for  Pain.        CHANGE how you take these medications    * ibuprofen 600 MG tablet  Commonly known as: ADVIL,MOTRIN  Take 1 tablet (600 mg total) by mouth every 6 (six) hours as needed for Pain.  What changed: Another medication with the same name was added. Make sure you understand how and when to take each.     * ibuprofen 800 MG tablet  Commonly known as: ADVIL,MOTRIN  Take 1 tablet (800 mg total) by mouth every 8 (eight) hours as needed for Pain.  What changed: You were already taking a medication with the same name, and this prescription was added. Make sure you understand how and when to take each.     rosuvastatin 10 MG tablet  Commonly known as: CRESTOR  Take 1 tablet (10 mg total) by mouth every evening.  What changed: when to take this         * This list has 2 medication(s) that are the same as other medications prescribed for you. Read the directions carefully, and ask your doctor or other care provider to review them with you.            CONTINUE taking these medications    acetaminophen-codeine 300-30mg 300-30 mg Tab  Commonly known as: TYLENOL #3  Take 1 tablet by mouth every 4 to 6 hours as needed.     aspirin 81 MG EC tablet  Commonly known as: ECOTRIN  Take 81 mg by mouth once daily.     calcium carbonate 600 mg calcium (1,500 mg) Tab  Commonly known as: OS-SUHAIL  Take 1,200 mg by mouth once daily.     CENTRUM SILVER ULTRA WOMEN'S ORAL  Take 1 tablet by mouth once daily.     docusate sodium 100 MG capsule  Commonly known as: COLACE  Take 1 capsule (100 mg total) by mouth 2 (two) times daily.     estradioL 0.5 MG tablet  Commonly known as: ESTRACE  Take 1 tablet (0.5 mg total) by mouth once daily.     fexofenadine 60 MG tablet  Commonly known as: ALLEGRA  Take 60 mg by mouth as needed.     furosemide 40 MG tablet  Commonly known as: LASIX  Take 1 tablet (40 mg total) by mouth daily as needed (swelling).     ICAPS AREDS2 ORAL  Take 2 capsules by mouth once daily.     loratadine 10 mg tablet  Commonly  known as: CLARITIN  Take 10 mg by mouth daily as needed.     mirabegron 25 mg Tb24 ER tablet  Commonly known as: MYRBETRIQ  Take 1 tablet (25 mg total) by mouth once daily.     SYNTHROID 75 MCG tablet  Generic drug: levothyroxine  TAKE 1 TABLET BY MOUTH DAILY     VITAMIN D3 25 mcg (1,000 unit) capsule  Generic drug: cholecalciferol (vitamin D3)  Take 1,000 Units by mouth once daily.        STOP taking these medications    UNABLE TO FIND          Discharge Procedure Orders   Diet general     Pelvic Rest   Order Comments: Nothing in the vagina until cleared by Dr. Cordova.     Lifting restrictions     Call MD for:   Order Comments: Heavy vaginal bleeding greater than spotting.     Call MD for:  extreme fatigue     Call MD for:  persistent dizziness or light-headedness     Call MD for:  hives     Call MD for:  redness, tenderness, or signs of infection (pain, swelling, redness, odor or green/yellow discharge around incision site)     Call MD for:  difficulty breathing, headache or visual disturbances     Call MD for:  severe uncontrolled pain     Call MD for:  persistent nausea and vomiting     Call MD for:  temperature >100.4     Activity as tolerated     Follow-up Information     Schedule an appointment as soon as possible for a visit with Maxx Castellanos DO.    Specialties: Gynecology, Urology  Why: post op  Contact information:  [address]  [facility] [id number] [phone]                 Racheal Bucio M.D.  PGY-4 OB/GYN  [facility]

## 2020-08-21 NOTE — HPI
Sury Gao is a 74 y.o. presents for scheduled surgical placement of MUS for treatment of stress urinary incontinence. She has a past medical history of Hypothyroidism, Nuclear sclerosis (4/23/2014), and Thyroid disease.  She is well known to the clinic. She recently returned to clinic with bothersome urinary urgency and incontinence.  History can be seen below.         04/09/2018  PROCEDURE DATE:  04/09/2018     1. Total Laparoscopic Hysterectomy  2. Bilateral salpingectomy-oophorectomy  3. Laparoscopic sacrocolpopexy with permanent mesh   4. Cystoscopy      Urinary urgency and incontinence in the past month, very bothersome . Walks in the morning before she has coffee but there is no leakage. When she walks in the afternoon she notes increase urinary urgency, leakage and generalized bladder discomfort       Interval  HP since the last visit   1)  UI:  (+) VLADIMIR: daily  (+) UUI  - several times a day, associated leakage 3-5 times a week.  (+) pads:1-2.  Daytime frequency: Q 4 -5 hours.  Nocturia: No:    (--) dysuria-  (--) hematuria,  (--) frequent UTIs.  (+) complete bladder emptying.      2)  POP:     Symptoms:(--)  .  (--) vaginal bleeding. (--) vaginal discharge. (+) sexually active: no penetration.  (--) dyspareunia.   (--)  Vaginal dryness.  (--) vaginal estrogen use.      3)  BM:  (+) constipation/straining.  (--) chronic diarrhea. (--) hematochezia.  (--) fecal incontinence.  (--) fecal smearing/urgency.  (--) incomplete evacuation.

## 2020-08-30 NOTE — PROGRESS NOTES
Urogyn follow up    [facility] UROGYNECOLOGY-[id number]   [address] [address]    Sury Gao  [id number]  1945      Sury Gao is a 74 y.o.   has a past medical history of Hypothyroidism, Nuclear sclerosis (4/23/2014), and Thyroid disease.  Well known to the clinic last seen in 2018. Returns with bothersome urinary urgency and incontinence.        04/09/2018  PROCEDURE DATE:  04/09/2018     1. Total Laparoscopic Hysterectomy  2. Bilateral salpingectomy-oophorectomy  3. Laparoscopic sacrocolpopexy with permanent mesh   4. Cystoscopy     Urinary urgency and incontinence in the past month, very bothersome . Walks in the morning before she has coffee but there is no leakage. When she walks in the afternoon she notes increase urinary urgency, leakage and generalized bladder discomfort      Interval  HP since the last visit   1)  UI:  (+) VLADIMIR: daily  (+) UUI  - several times a day, associated leakage 3-5 times a week.  (+) pads:1-2.  Daytime frequency: Q 4 -5 hours.  Nocturia: No:    (--) dysuria-  (--) hematuria,  (--) frequent UTIs.  (+) complete bladder emptying.     2)  POP:     Symptoms:(--)  .  (--) vaginal bleeding. (--) vaginal discharge. (+) sexually active: no penetration.  (--) dyspareunia.   (--)  Vaginal dryness.  (--) vaginal estrogen use.     3)  BM:  (+) constipation/straining.  (--) chronic diarrhea. (--) hematochezia.  (--) fecal incontinence.  (--) fecal smearing/urgency.  (--) incomplete evacuation.       Past Medical History:   Diagnosis Date    Hypothyroidism     Nuclear sclerosis 4/23/2014    Thyroid disease     hypothyroid       Past Surgical History:   Procedure Laterality Date    Bilateral lower extremity varicose vein treatment       COLONOSCOPY N/A 2/23/2018    CREATION OF URETHRAL SLING USING POLYPROPYLENE TAPE BY RETROPUBIC APPROACH N/A 8/21/2020    Procedure: SLING, RETROPUBIC    ;  Surgeon: Maxx Castellanos DO;  Location:  [facility] OR;  Service: OB/GYN;  Laterality: N/A;    DILATION AND CURETTAGE OF UTERUS      HIP REPLACEMENT ARTHROPLASTY Left     HYSTERECTOMY  04/09/2018    TLHBSO with Sacrocolpopexy for prolapse    gutierrez neuroma      left foot    MINI-LAPAROTOMY      D&C perforation    OOPHORECTOMY  04/09/2018    TONSILLECTOMY      WRIST SURGERY      left wrist: Swave-Kapuny surgery       Medications:    Current Outpatient Medications:     aspirin (ECOTRIN) 81 MG EC tablet, Take 81 mg by mouth once daily., Disp: , Rfl:     calcium carbonate (OS-SUHAIL) 600 mg calcium (1,500 mg) Tab, Take 1,200 mg by mouth once daily., Disp: , Rfl:     cholecalciferol, vitamin D3, (VITAMIN D3) 25 mcg (1,000 unit) capsule, Take 1,000 Units by mouth once daily., Disp: , Rfl:     estradioL (ESTRACE) 0.5 MG tablet, Take 1 tablet (0.5 mg total) by mouth once daily., Disp: 90 tablet, Rfl: 3    fexofenadine (ALLEGRA) 60 MG tablet, Take 60 mg by mouth as needed., Disp: , Rfl:     loratadine (CLARITIN) 10 mg tablet, Take 10 mg by mouth daily as needed., Disp: , Rfl:     mirabegron (MYRBETRIQ) 25 mg Tb24 ER tablet, Take 1 tablet (25 mg total) by mouth once daily., Disp: 30 tablet, Rfl: 11    multivit,iron,minerals/lutein (CENTRUM SILVER ULTRA WOMEN'S ORAL), Take 1 tablet by mouth once daily., Disp: , Rfl:     rosuvastatin (CRESTOR) 10 MG tablet, Take 1 tablet (10 mg total) by mouth every evening. (Patient taking differently: Take 10 mg by mouth once daily. ), Disp: 90 tablet, Rfl: 3    SYNTHROID 75 mcg tablet, TAKE 1 TABLET BY MOUTH DAILY, Disp: 90 tablet, Rfl: 3    vitC/E/Zn/copper/lutein/zeaxan (ICAPS AREDS2 ORAL), Take 2 capsules by mouth once daily., Disp: , Rfl:     docusate sodium (COLACE) 100 MG capsule, Take 1 capsule (100 mg total) by mouth 2 (two) times daily., Disp: 60 capsule, Rfl: 11    furosemide (LASIX) 40 MG tablet, Take 1 tablet (40 mg total) by mouth daily as needed (swelling)., Disp: 90 tablet, Rfl: 2    ibuprofen  "(ADVIL,MOTRIN) 600 MG tablet, Take 1 tablet (600 mg total) by mouth every 6 (six) hours as needed for Pain., Disp: 30 tablet, Rfl: 1    ibuprofen (ADVIL,MOTRIN) 800 MG tablet, Take 1 tablet (800 mg total) by mouth every 8 (eight) hours as needed for Pain., Disp: 30 tablet, Rfl: 1    oxyCODONE-acetaminophen (PERCOCET) 5-325 mg per tablet, Take 1 tablet by mouth every 4 (four) hours as needed for Pain., Disp: 14 tablet, Rfl: 0      ROS:  As per HPI.      Exam  /62 (BP Location: Left arm)   Ht 5' 4" (1.626 m)   Wt 67.7 kg (149 lb 4 oz)   BMI 25.62 kg/m²   General: alert and oriented, no acute distress  Respiratory: normal respiratory effort  Abd: soft, non-tender, non-distended    Pelvic  Ext. Genitalia: normal external genitalia. Normal bartholin's and skeens glands  Vagina: + atrophy. Normal vaginal mucosa without lesions. No discharge noted.    Non-tender bladder base without palpable mass.  Cervix: absent  Uterus:  absent   Urethra: no masses or tenderness, caruncle   Urethral meatus: no lesions, caruncle or prolapse.  Rectal: deferred     Impression  1. Mixed incontinence urge and stress     2. Urinary urgency          The patient was placed in dorsal lithotomy position with feet in stirrups.  The urethra was prepped x 2 with betadine.  A red rubber catheter was used to drain the bladder.  The bladder was filled first sensation: 160 first desire 220 second desire 280  with capacity 310 mL sterile water to gravity with a 60 mL ayala tipped syringe, at which point she voiced a strong desire to void.  The catheter was removed. She was asked to cough with (+) leak.  The bladder was then drained with a catheter. She tolerated the procedure well.    Impression: (+) VLADIMIR      We reviewed the above issues and discussed options for short-term versus long-term management of her problems.       Patient consented for placement of retropubic synthetic midurethral sling, and cystourethroscopy.   R/B/A reviewed. " Specific risks reviewed include:  infection, bleeding, need for blood transfusion, damage to surrounding structures, anesthesia risks, death, heart attack, stroke, mesh erosion/extrusion, pain, dyspareunia, urinary retention, voiding dysfunction, urinary incontinence, exacerbation of urinary urge incontinence, and need for further surgeries.  Success rate of midurethral sling for VLADIMIR was reviewed as 80-85%, and she understands that this will not necessarily impact other types of urinary incontinence.  Alternatives reviewed include:  pessary/periurethral injections/PT/medication for VLADIMIR. All questions were answered and consents were signed today and placed in the chart.  T&S, urine HCG on DOS  Cleared for OR per PCP  VTE Prophylaxis: SCDs  Proceed to OR for above-mentioned procedure.  Patient instructed on chlorahexadine/dial soap prep to perform day before & AM of surgery.     25 minutes were spent in face to face time with this patient  90 % of this time was spent in counseling and/or coordination of care     Maxx Castellanos DO [facility]  Division of Female Pelvic Medicine and Reconstructive Surgery  Department of Obstetrics & Gynecology

## 2020-09-14 ENCOUNTER — OFFICE VISIT (OUTPATIENT)
Dept: UROGYNECOLOGY | Facility: CLINIC | Age: 75
End: 2020-09-14
Payer: MEDICARE

## 2020-09-14 VITALS
WEIGHT: 150.56 LBS | BODY MASS INDEX: 25.7 KG/M2 | DIASTOLIC BLOOD PRESSURE: 55 MMHG | SYSTOLIC BLOOD PRESSURE: 91 MMHG | HEIGHT: 64 IN

## 2020-09-14 DIAGNOSIS — N95.2 ATROPHIC VAGINITIS: ICD-10-CM

## 2020-09-14 DIAGNOSIS — Z98.890 POST-OPERATIVE STATE: Primary | ICD-10-CM

## 2020-09-14 PROCEDURE — 99999 PR PBB SHADOW E&M-EST. PATIENT-LVL III: ICD-10-PCS | Mod: PBBFAC,HCNC,, | Performed by: NURSE PRACTITIONER

## 2020-09-14 PROCEDURE — 99024 PR POST-OP FOLLOW-UP VISIT: ICD-10-PCS | Mod: HCNC,S$GLB,, | Performed by: NURSE PRACTITIONER

## 2020-09-14 PROCEDURE — 99024 POSTOP FOLLOW-UP VISIT: CPT | Mod: HCNC,S$GLB,, | Performed by: NURSE PRACTITIONER

## 2020-09-14 PROCEDURE — 99999 PR PBB SHADOW E&M-EST. PATIENT-LVL III: CPT | Mod: PBBFAC,HCNC,, | Performed by: NURSE PRACTITIONER

## 2020-09-14 NOTE — PROGRESS NOTES
[facility] UROGYNECOLOGY-[id number]   [address] [address]  September 14, 2020     Sury Gao  [id number]  1945    UROGYN POST-OP  9/14/2020     Sury Gao is a 74 y.o.  here for a post operative visit.    OPERATIVE NOTE     PROCEDURE DATE: 08/21/2020     PROCEDURE:      1.  Retropubic Sling placement  ([employer] - REF: [id number]   Lot# [id number])    2. Cystourethroscopy     HISTORY SINCE LAST VISIT:  Denies pain, bleeding, or discharge.  Bladder issues: Denies UI, urinary urgency, or urinary frequency   Bowel issues: Intermittent constipation. Taking stool softener.    Past Medical History:   Diagnosis Date    Hypothyroidism     Nuclear sclerosis 4/23/2014    Thyroid disease     hypothyroid       Past Surgical History:   Procedure Laterality Date    Bilateral lower extremity varicose vein treatment       COLONOSCOPY N/A 2/23/2018    CREATION OF URETHRAL SLING USING POLYPROPYLENE TAPE BY RETROPUBIC APPROACH N/A 8/21/2020    Procedure: SLING, RETROPUBIC    ;  Surgeon: Maxx Castellanos DO;  [facility];  Service: OB/GYN;  Laterality: N/A;    DILATION AND CURETTAGE OF UTERUS      HIP REPLACEMENT ARTHROPLASTY Left     HYSTERECTOMY  04/09/2018    TLHBSO with Sacrocolpopexy for prolapse    gutierrez neuroma      left foot    MINI-LAPAROTOMY      D&C perforation    OOPHORECTOMY  04/09/2018    TONSILLECTOMY      WRIST SURGERY      left wrist: Swave-Kapunjay surgery       Family History   Problem Relation Age of Onset    Hypothyroidism Mother     Cataracts Mother     Heart disease Mother         Valvular heart disease    Cataracts Father     Macular degeneration Father     Hypothyroidism Sister     Hypothyroidism Daughter     Cancer Brother         testicular cancer    Amblyopia Neg Hx     Blindness Neg Hx     Glaucoma Neg Hx     Retinal detachment Neg Hx     Strabismus Neg Hx     Breast cancer Neg Hx     Colon cancer Neg  Hx     Diabetes Neg Hx     Hypertension Neg Hx     Ovarian cancer Neg Hx     Cervical cancer Neg Hx     Endometrial cancer Neg Hx     Vaginal cancer Neg Hx     Heart attack Neg Hx     Hyperlipidemia Neg Hx     Stroke Neg Hx     Heart failure Neg Hx        Social History     Socioeconomic History    Marital status:      Spouse name: Not on file    Number of children: Not on file    Years of education: Not on file    Highest education level: Not on file   Occupational History    Not on file   Social Needs    Financial resource strain: Not on file    Food insecurity     Worry: Not on file     Inability: Not on file    Transportation needs     Medical: Not on file     Non-medical: Not on file   Tobacco Use    Smoking status: Former Smoker     Packs/day: 0.25     Years: 10.00     Pack years: 2.50     Quit date: 6/10/1976     Years since quittin.2    Smokeless tobacco: Former User   Substance and Sexual Activity    Alcohol use: Yes     Comment: social    Drug use: No    Sexual activity: Yes   Lifestyle    Physical activity     Days per week: Not on file     Minutes per session: Not on file    Stress: Not on file   Relationships    Social connections     Talks on phone: Not on file     Gets together: Not on file     Attends Christian service: Not on file     Active member of club or organization: Not on file     Attends meetings of clubs or organizations: Not on file     Relationship status: Not on file   Other Topics Concern    Not on file   Social History Narrative        Boyfriend is physician    Exercise limited by hip pain from OA       Current Outpatient Medications   Medication Sig Dispense Refill    aspirin (ECOTRIN) 81 MG EC tablet Take 81 mg by mouth once daily.      calcium carbonate (OS-SUHAIL) 600 mg calcium (1,500 mg) Tab Take 1,200 mg by mouth once daily.      cholecalciferol, vitamin D3, (VITAMIN D3) 25 mcg (1,000 unit) capsule Take 1,000 Units by mouth once  daily.      docusate sodium (COLACE) 100 MG capsule Take 1 capsule (100 mg total) by mouth 2 (two) times daily. 60 capsule 11    estradioL (ESTRACE) 0.5 MG tablet Take 1 tablet (0.5 mg total) by mouth once daily. 90 tablet 3    fexofenadine (ALLEGRA) 60 MG tablet Take 60 mg by mouth as needed.      furosemide (LASIX) 40 MG tablet Take 1 tablet (40 mg total) by mouth daily as needed (swelling). 90 tablet 2    ibuprofen (ADVIL,MOTRIN) 600 MG tablet Take 1 tablet (600 mg total) by mouth every 6 (six) hours as needed for Pain. 30 tablet 1    loratadine (CLARITIN) 10 mg tablet Take 10 mg by mouth daily as needed.      multivit,iron,minerals/lutein (CENTRUM SILVER ULTRA WOMEN'S ORAL) Take 1 tablet by mouth once daily.      rosuvastatin (CRESTOR) 10 MG tablet Take 1 tablet (10 mg total) by mouth every evening. (Patient taking differently: Take 10 mg by mouth once daily. ) 90 tablet 3    SYNTHROID 75 mcg tablet TAKE 1 TABLET BY MOUTH DAILY 90 tablet 3    vitC/E/Zn/copper/lutein/zeaxan (ICAPS AREDS2 ORAL) Take 2 capsules by mouth once daily.      mirabegron (MYRBETRIQ) 25 mg Tb24 ER tablet Take 1 tablet (25 mg total) by mouth once daily. (Patient not taking: Reported on 9/14/2020) 30 tablet 11     No current facility-administered medications for this visit.        Review of patient's allergies indicates:  No Known Allergies     ROS  Per HPI    Well Woman   Pap:03/2017 normal  Mammo:06/2020 normal  Colonoscopy:02/2018  - Diverticulosis in the sigmoid colon.                         - One 3 mm polyp in the ascending colon, removed with a jumbo cold forceps. Resected and retrieved.                         - Two 4 mm polyps at the recto-sigmoid colon, removed with a jumbo cold forceps. Resected and retrieved.   Dexa:09/2019   1. Osteopenia at the total hip and femoral neck.  2.  Degenerative changes at the lumbar spine may cause increased bone density in this region.  RECOMMENDATIONS of [facility] and  "Endocrinology Departments:  1.  Calcium 5958-7070 mg daily and vitamin D 800-1000 units daily, adequate exercise.  2.  No additional pharmacologic therapy recommended at this time.  Consider risks and benefits of long-term estrogen therapy in this age group.  3.  Repeat BMD in 2-3 years    Physical Exam  BP (!) 91/55 (BP Location: Right arm, Patient Position: Sitting)   Ht 5' 4" (1.626 m)   Wt 68.3 kg (150 lb 9.2 oz)   BMI 25.85 kg/m²   General: alert and oriented, no acute distress  Respiratory: normal respiratory effort  Abd: soft, non-tender, non-distended    Pelvic:  Ext. Genitalia: normal external genitalia. Normal bartholin's and skeens glands  Vagina: + atrophy. Normal vaginal mucosa without lesions. No discharge noted. Incision healing well-- sutures intact. Sling path nontender. No mesh visible/ palpable.  Non-tender bladder base without palpable mass.  Cervix: absent  Uterus:  absent   Urethra: no masses or tenderness  Urethral meatus: no lesions, caruncle or prolapse.    PVR 40 mL    Impression  1. Post-operative state     2. Atrophic vaginitis       We reviewed the above issues and discussed options for short-term versus long-term management of her problems.     Plan:   1. Post op healing well. Continue to follow post op instructions.  2. Start daily fiber.  Take 1 tsp of fiber powder (psyllium or other sugar-free powder).  Mix in 8 oz of water.  Take x 3-5 days.  Then, increase fiber by 1 tsp every 3-5 days until stool is easy to pass.  Stop and continue at that dose.   Do not exceed 6 tsps/day.  May also use over the counter stool softener 1-2 x/day.  AVOID laxatives.  3. Patient will follow up with us in 4 weeks    20 minutes were spent in face to face time with this patient  90 % of this time was spent in counseling and/or coordination of care      ELIZABETH Carlisle-BC  [facility]  Division of Female Pelvic Medicine and Reconstructive Surgery  Department of Obstetrics & " Gynecology

## 2020-09-14 NOTE — PATIENT INSTRUCTIONS
1. Post op healing well. Continue to follow post op instructions.  2. Start daily fiber.  Take 1 tsp of fiber powder (psyllium or other sugar-free powder).  Mix in 8 oz of water.  Take x 3-5 days.  Then, increase fiber by 1 tsp every 3-5 days until stool is easy to pass.  Stop and continue at that dose.   Do not exceed 6 tsps/day.  May also use over the counter stool softener 1-2 x/day.  AVOID laxatives.  3. Patient will follow up with us in 4 weeks

## 2020-10-14 ENCOUNTER — OFFICE VISIT (OUTPATIENT)
Dept: UROGYNECOLOGY | Facility: CLINIC | Age: 75
End: 2020-10-14
Payer: MEDICARE

## 2020-10-14 VITALS
DIASTOLIC BLOOD PRESSURE: 55 MMHG | SYSTOLIC BLOOD PRESSURE: 113 MMHG | HEART RATE: 66 BPM | BODY MASS INDEX: 25.41 KG/M2 | HEIGHT: 64 IN | WEIGHT: 148.81 LBS

## 2020-10-14 DIAGNOSIS — Z98.890 POST-OPERATIVE STATE: Primary | ICD-10-CM

## 2020-10-14 DIAGNOSIS — K59.00 CONSTIPATION, UNSPECIFIED CONSTIPATION TYPE: ICD-10-CM

## 2020-10-14 PROCEDURE — 99024 PR POST-OP FOLLOW-UP VISIT: ICD-10-PCS | Mod: HCNC,S$GLB,, | Performed by: NURSE PRACTITIONER

## 2020-10-14 PROCEDURE — 99024 POSTOP FOLLOW-UP VISIT: CPT | Mod: HCNC,S$GLB,, | Performed by: NURSE PRACTITIONER

## 2020-10-14 PROCEDURE — 99999 PR PBB SHADOW E&M-EST. PATIENT-LVL IV: CPT | Mod: PBBFAC,HCNC,, | Performed by: NURSE PRACTITIONER

## 2020-10-14 PROCEDURE — 99999 PR PBB SHADOW E&M-EST. PATIENT-LVL IV: ICD-10-PCS | Mod: PBBFAC,HCNC,, | Performed by: NURSE PRACTITIONER

## 2020-10-14 NOTE — PROGRESS NOTES
[facility] UROGYNECOLOGY-[id number]   [address] [address]  October 14, 2020     Sury Gao  [id number]  1945    UROGYN POST-OP  10/14/2020     Sury Gao is a 75 y.o.  here for a post operative visit.    OPERATIVE NOTE     PROCEDURE DATE: 08/21/2020     PROCEDURE:      1.  Retropubic Sling placement  ([employer] - REF: [id number]   Lot# [id number])    2. Cystourethroscopy     HISTORY SINCE LAST VISIT:  Denies pain, bleeding, or discharge.  Bladder issues: Denies VLADIMIR.  Rare UUI after bladder irrtatants.   Bowel issues: Intermittent constipation. Taking stool softener and metamucil. Taking 2 tsp daily    Past Medical History:   Diagnosis Date    Hypothyroidism     Nuclear sclerosis 4/23/2014    Thyroid disease     hypothyroid       Past Surgical History:   Procedure Laterality Date    Bilateral lower extremity varicose vein treatment       COLONOSCOPY N/A 2/23/2018    CREATION OF URETHRAL SLING USING POLYPROPYLENE TAPE BY RETROPUBIC APPROACH N/A 8/21/2020    Procedure: SLING, RETROPUBIC    ;  Surgeon: Maxx Castellanos DO;  Location: [facility];  Service: OB/GYN;  Laterality: N/A;    DILATION AND CURETTAGE OF UTERUS      HIP REPLACEMENT ARTHROPLASTY Left     HYSTERECTOMY  04/09/2018    TLHBSO with Sacrocolpopexy for prolapse    gutierrez neuroma      left foot    MINI-LAPAROTOMY      D&C perforation    OOPHORECTOMY  04/09/2018    TONSILLECTOMY      WRIST SURGERY      left wrist: Swave-Kapunjay surgery       Family History   Problem Relation Age of Onset    Hypothyroidism Mother     Cataracts Mother     Heart disease Mother         Valvular heart disease    Cataracts Father     Macular degeneration Father     Hypothyroidism Sister     Hypothyroidism Daughter     Cancer Brother         testicular cancer    Amblyopia Neg Hx     Blindness Neg Hx     Glaucoma Neg Hx     Retinal detachment Neg Hx     Strabismus Neg Hx     Breast  cancer Neg Hx     Colon cancer Neg Hx     Diabetes Neg Hx     Hypertension Neg Hx     Ovarian cancer Neg Hx     Cervical cancer Neg Hx     Endometrial cancer Neg Hx     Vaginal cancer Neg Hx     Heart attack Neg Hx     Hyperlipidemia Neg Hx     Stroke Neg Hx     Heart failure Neg Hx        Social History     Socioeconomic History    Marital status:      Spouse name: Not on file    Number of children: Not on file    Years of education: Not on file    Highest education level: Not on file   Occupational History    Not on file   Social Needs    Financial resource strain: Not on file    Food insecurity     Worry: Not on file     Inability: Not on file    Transportation needs     Medical: Not on file     Non-medical: Not on file   Tobacco Use    Smoking status: Former Smoker     Packs/day: 0.25     Years: 10.00     Pack years: 2.50     Quit date: 6/10/1976     Years since quittin.3    Smokeless tobacco: Former User   Substance and Sexual Activity    Alcohol use: Yes     Comment: social    Drug use: No    Sexual activity: Yes   Lifestyle    Physical activity     Days per week: Not on file     Minutes per session: Not on file    Stress: Not on file   Relationships    Social connections     Talks on phone: Not on file     Gets together: Not on file     Attends Orthodoxy service: Not on file     Active member of club or organization: Not on file     Attends meetings of clubs or organizations: Not on file     Relationship status: Not on file   Other Topics Concern    Not on file   Social History Narrative        Boyfriend is physician    Exercise limited by hip pain from OA       Current Outpatient Medications   Medication Sig Dispense Refill    aspirin (ECOTRIN) 81 MG EC tablet Take 81 mg by mouth once daily.      calcium carbonate (OS-SUHAIL) 600 mg calcium (1,500 mg) Tab Take 1,200 mg by mouth once daily.      cholecalciferol, vitamin D3, (VITAMIN D3) 25 mcg (1,000 unit) capsule  Take 1,000 Units by mouth once daily.      estradioL (ESTRACE) 0.5 MG tablet Take 1 tablet (0.5 mg total) by mouth once daily. 90 tablet 3    fexofenadine (ALLEGRA) 60 MG tablet Take 60 mg by mouth as needed.      ibuprofen (ADVIL,MOTRIN) 600 MG tablet Take 1 tablet (600 mg total) by mouth every 6 (six) hours as needed for Pain. 30 tablet 1    loratadine (CLARITIN) 10 mg tablet Take 10 mg by mouth daily as needed.      mirabegron (MYRBETRIQ) 25 mg Tb24 ER tablet Take 1 tablet (25 mg total) by mouth once daily. 30 tablet 11    multivit,iron,minerals/lutein (CENTRUM SILVER ULTRA WOMEN'S ORAL) Take 1 tablet by mouth once daily.      rosuvastatin (CRESTOR) 10 MG tablet Take 1 tablet (10 mg total) by mouth every evening. (Patient taking differently: Take 10 mg by mouth once daily. ) 90 tablet 3    SYNTHROID 75 mcg tablet TAKE 1 TABLET BY MOUTH DAILY 90 tablet 3    vitC/E/Zn/copper/lutein/zeaxan (ICAPS AREDS2 ORAL) Take 2 capsules by mouth once daily.      furosemide (LASIX) 40 MG tablet Take 1 tablet (40 mg total) by mouth daily as needed (swelling). 90 tablet 2     No current facility-administered medications for this visit.        Review of patient's allergies indicates:  No Known Allergies     ROS  Per HPI    Well Woman   Pap:03/2017 normal  Mammo:06/2020 normal  Colonoscopy:02/2018  - Diverticulosis in the sigmoid colon.                         - One 3 mm polyp in the ascending colon, removed with a jumbo cold forceps. Resected and retrieved.                         - Two 4 mm polyps at the recto-sigmoid colon, removed with a jumbo cold forceps. Resected and retrieved.   Dexa:09/2019   1. Osteopenia at the total hip and femoral neck.  2.  Degenerative changes at the lumbar spine may cause increased bone density in this region.  RECOMMENDATIONS of [facility] Dep[facility]:  1.  Calcium 5478-2914 mg daily and vitamin D 800-1000 units daily, adequate exercise.  2.  No additional  "pharmacologic therapy recommended at this time.  Consider risks and benefits of long-term estrogen therapy in this age group.  3.  Repeat BMD in 2-3 years    Physical Exam  BP (!) 113/55   Pulse 66   Ht 5' 4" (1.626 m)   Wt 67.5 kg (148 lb 13 oz)   BMI 25.54 kg/m²   General: alert and oriented, no acute distress  Respiratory: normal respiratory effort  Abd: soft, non-tender, non-distended    Pelvic:  Ext. Genitalia: normal external genitalia. Normal bartholin's and skeens glands  Vagina: + atrophy. Normal vaginal mucosa without lesions. No discharge noted. Incision well healed. Sling path nontender. No mesh visible/ palpable.  Non-tender bladder base without palpable mass.  Cervix: absent  Uterus:  absent   Urethra: no masses or tenderness  Urethral meatus: no lesions, caruncle or prolapse.    PVR 40 mL    Dr. Castellanos present during exam    Impression  1. Post-operative state     2. Constipation, unspecified constipation type       We reviewed the above issues and discussed options for short-term versus long-term management of her problems.     Plan:   1. Post op healing well. Continue to follow post op instructions.  2. Start daily fiber.  Take 1 tsp of fiber powder (psyllium or other sugar-free powder).  Mix in 8 oz of water.  Take x 3-5 days.  Then, increase fiber by 1 tsp every 3-5 days until stool is easy to pass.  Stop and continue at that dose.   Do not exceed 6 tsps/day.  May also use over the counter stool softener 1-2 x/day.  AVOID laxatives.  3. Patient will follow up with us in 4 weeks    20 minutes were spent in face to face time with this patient  90 % of this time was spent in counseling and/or coordination of care      ELIZABETH Carlisle-BC [facility]  Division of Female Pelvic Medicine and Reconstructive Surgery  Department of Obstetrics & Gynecology     "

## 2021-01-01 NOTE — PROGRESS NOTES
Subjective:       Patient ID: Sury Gao is a 72 y.o. female.    Chief Complaint: Leg Swelling and Hip Pain (left)    HPI 72-year-old white female presents to clinic today secondary to concerns of intermittent lower extremity swelling for the past month.  She reports lack swelling that gradually worsens throughout the day while she is on her feet.  She reports that she recently returned to work and since being back at work she has been noticing more leg swelling.  She does report improvement of the lack swelling with rest in the evenings.  She denies any chest pain, shortness of breath, headaches, or dizziness.  Secondarily, she has begun noticing worsening left hip pain since being back at work.  She reports pain worse with prolonged sitting or any flexion of her hip such as walking up and down stairs.  On occasion she does use ibuprofen with mild relief.  She reports that this has been a chronic issue that began approximately 5 years ago.  She was seen by Orthopedics at [facility] when the pain initially started and was diagnosed with bursitis.  She did physical therapy at that time with improvement but now the pain has returned.  Finally, she has been focusing on diet and exercise and has been able to mildly improve her cholesterol levels but at this time she is interested in discussing and medical treatment.  Treatment with low-dose statin versus over-the-counter red yeast rice has been discussed and the patient wishes to proceed with red yeast rice.  Review of Systems   Constitutional: Negative for appetite change, chills, fatigue and fever.   HENT: Negative for congestion, ear pain, hearing loss, postnasal drip, rhinorrhea, sinus pressure, sore throat and tinnitus.    Eyes: Negative for redness, itching and visual disturbance.   Respiratory: Negative for cough, chest tightness and shortness of breath.    Cardiovascular: Positive for leg swelling (intermitent). Negative for chest pain and palpitations.    Gastrointestinal: Negative for abdominal pain, constipation, diarrhea, nausea and vomiting.   Genitourinary: Negative for decreased urine volume, difficulty urinating, dysuria, frequency, hematuria and urgency.   Musculoskeletal: Positive for arthralgias (left hip ). Negative for back pain, myalgias, neck pain and neck stiffness.   Skin: Negative for rash.   Neurological: Negative for dizziness, light-headedness and headaches.   Psychiatric/Behavioral: Negative.        Objective:      Physical Exam   Constitutional: She is oriented to person, place, and time. She appears well-developed and well-nourished. No distress.   HENT:   Head: Normocephalic and atraumatic.   Right Ear: External ear normal.   Left Ear: External ear normal.   Nose: Nose normal.   Mouth/Throat: Oropharynx is clear and moist. No oropharyngeal exudate.   Eyes: Conjunctivae and EOM are normal. Pupils are equal, round, and reactive to light. Right eye exhibits no discharge. Left eye exhibits no discharge. No scleral icterus.   Neck: Normal range of motion. Neck supple. No JVD present. No tracheal deviation present. No thyromegaly present.   Cardiovascular: Normal rate, regular rhythm, normal heart sounds and intact distal pulses.  Exam reveals no gallop and no friction rub.    No murmur heard.  Pulmonary/Chest: Effort normal and breath sounds normal. No stridor. No respiratory distress. She has no wheezes. She has no rales.   Abdominal: Soft. Bowel sounds are normal. She exhibits no distension and no mass. There is no tenderness. There is no rebound and no guarding.   Musculoskeletal: She exhibits no edema.        Left hip: She exhibits decreased range of motion (Increased tone to hip flexors and positive FADIR and CAIO) and tenderness.   Lymphadenopathy:     She has no cervical adenopathy.   Neurological: She is alert and oriented to person, place, and time.   Skin: Skin is warm and dry. No rash noted. She is not diaphoretic. No erythema. No  pallor.   Psychiatric: She has a normal mood and affect. Her behavior is normal. Judgment and thought content normal.   Nursing note and vitals reviewed.      Assessment:       1. Mild peripheral edema    2. Left hip pain    3. Hyperlipidemia, unspecified hyperlipidemia type        Plan:       1.  Encouraged increased hydration.  2.  Encourage elevation of the lower extremities at rest.  3.  Encourage use of compression stockings with activity.  4.  Left hip x-ray now.  5.  Hip stretches as discussed.  6.  Prednisone 40 mg daily for 5 days.  7.  Over-the-counter red yeast rice daily.  8.  Return to clinic as needed if symptoms persist or worsen.   no

## 2021-01-05 ENCOUNTER — IMMUNIZATION (OUTPATIENT)
Dept: INTERNAL MEDICINE | Facility: CLINIC | Age: 76
End: 2021-01-05
Payer: MEDICARE

## 2021-01-05 DIAGNOSIS — Z23 NEED FOR VACCINATION: ICD-10-CM

## 2021-01-05 PROCEDURE — 91300 COVID-19, MRNA, LNP-S, PF, 30 MCG/0.3 ML DOSE VACCINE: CPT | Mod: PBBFAC | Performed by: INTERNAL MEDICINE

## 2021-01-11 ENCOUNTER — OFFICE VISIT (OUTPATIENT)
Dept: INTERNAL MEDICINE | Facility: CLINIC | Age: 76
End: 2021-01-11
Payer: MEDICARE

## 2021-01-11 VITALS
DIASTOLIC BLOOD PRESSURE: 58 MMHG | HEART RATE: 60 BPM | WEIGHT: 149.81 LBS | OXYGEN SATURATION: 96 % | HEIGHT: 64 IN | BODY MASS INDEX: 25.57 KG/M2 | SYSTOLIC BLOOD PRESSURE: 90 MMHG | TEMPERATURE: 97 F

## 2021-01-11 DIAGNOSIS — Z79.899 OTHER LONG TERM (CURRENT) DRUG THERAPY: ICD-10-CM

## 2021-01-11 DIAGNOSIS — K57.90 DIVERTICULOSIS: ICD-10-CM

## 2021-01-11 DIAGNOSIS — Z86.010 HISTORY OF COLON POLYPS: ICD-10-CM

## 2021-01-11 DIAGNOSIS — E03.9 ACQUIRED HYPOTHYROIDISM: ICD-10-CM

## 2021-01-11 DIAGNOSIS — Z85.828 HISTORY OF BASAL CELL CARCINOMA (BCC) OF SKIN: ICD-10-CM

## 2021-01-11 DIAGNOSIS — E78.5 HYPERLIPIDEMIA, UNSPECIFIED HYPERLIPIDEMIA TYPE: Primary | ICD-10-CM

## 2021-01-11 PROBLEM — Z86.0100 HISTORY OF COLON POLYPS: Status: ACTIVE | Noted: 2021-01-11

## 2021-01-11 PROCEDURE — 99999 PR PBB SHADOW E&M-EST. PATIENT-LVL IV: CPT | Mod: PBBFAC,HCNC,, | Performed by: FAMILY MEDICINE

## 2021-01-11 PROCEDURE — 99214 OFFICE O/P EST MOD 30 MIN: CPT | Mod: HCNC,S$GLB,, | Performed by: FAMILY MEDICINE

## 2021-01-11 PROCEDURE — 1159F PR MEDICATION LIST DOCUMENTED IN MEDICAL RECORD: ICD-10-PCS | Mod: HCNC,S$GLB,, | Performed by: FAMILY MEDICINE

## 2021-01-11 PROCEDURE — 1159F MED LIST DOCD IN RCRD: CPT | Mod: HCNC,S$GLB,, | Performed by: FAMILY MEDICINE

## 2021-01-11 PROCEDURE — 99214 PR OFFICE/OUTPT VISIT, EST, LEVL IV, 30-39 MIN: ICD-10-PCS | Mod: HCNC,S$GLB,, | Performed by: FAMILY MEDICINE

## 2021-01-11 PROCEDURE — 3288F PR FALLS RISK ASSESSMENT DOCUMENTED: ICD-10-PCS | Mod: HCNC,CPTII,S$GLB, | Performed by: FAMILY MEDICINE

## 2021-01-11 PROCEDURE — 3008F PR BODY MASS INDEX (BMI) DOCUMENTED: ICD-10-PCS | Mod: HCNC,CPTII,S$GLB, | Performed by: FAMILY MEDICINE

## 2021-01-11 PROCEDURE — 1126F AMNT PAIN NOTED NONE PRSNT: CPT | Mod: HCNC,S$GLB,, | Performed by: FAMILY MEDICINE

## 2021-01-11 PROCEDURE — 1101F PR PT FALLS ASSESS DOC 0-1 FALLS W/OUT INJ PAST YR: ICD-10-PCS | Mod: HCNC,CPTII,S$GLB, | Performed by: FAMILY MEDICINE

## 2021-01-11 PROCEDURE — 1101F PT FALLS ASSESS-DOCD LE1/YR: CPT | Mod: HCNC,CPTII,S$GLB, | Performed by: FAMILY MEDICINE

## 2021-01-11 PROCEDURE — 1126F PR PAIN SEVERITY QUANTIFIED, NO PAIN PRESENT: ICD-10-PCS | Mod: HCNC,S$GLB,, | Performed by: FAMILY MEDICINE

## 2021-01-11 PROCEDURE — 3288F FALL RISK ASSESSMENT DOCD: CPT | Mod: HCNC,CPTII,S$GLB, | Performed by: FAMILY MEDICINE

## 2021-01-11 PROCEDURE — 99999 PR PBB SHADOW E&M-EST. PATIENT-LVL IV: ICD-10-PCS | Mod: PBBFAC,HCNC,, | Performed by: FAMILY MEDICINE

## 2021-01-11 PROCEDURE — 3008F BODY MASS INDEX DOCD: CPT | Mod: HCNC,CPTII,S$GLB, | Performed by: FAMILY MEDICINE

## 2021-01-11 RX ORDER — INFLUENZA A VIRUS A/MICHIGAN/45/2015 X-275 (H1N1) ANTIGEN (FORMALDEHYDE INACTIVATED), INFLUENZA A VIRUS A/SINGAPORE/INFIMH-16-0019/2016 IVR-186 (H3N2) ANTIGEN (FORMALDEHYDE INACTIVATED), INFLUENZA B VIRUS B/PHUKET/3073/2013 ANTIGEN (FORMALDEHYDE INACTIVATED), AND INFLUENZA B VIRUS B/MARYLAND/15/2016 BX-69A ANTIGEN (FORMALDEHYDE INACTIVATED) 60; 60; 60; 60 UG/.7ML; UG/.7ML; UG/.7ML; UG/.7ML
INJECTION, SUSPENSION INTRAMUSCULAR
COMMUNITY
Start: 2020-10-05 | End: 2021-03-30 | Stop reason: ALTCHOICE

## 2021-01-11 RX ORDER — LEVOTHYROXINE SODIUM 75 UG/1
TABLET ORAL
Qty: 90 TABLET | Refills: 3 | Status: SHIPPED | OUTPATIENT
Start: 2021-01-11 | End: 2021-11-15 | Stop reason: SDUPTHER

## 2021-01-26 ENCOUNTER — IMMUNIZATION (OUTPATIENT)
Dept: INTERNAL MEDICINE | Facility: CLINIC | Age: 76
End: 2021-01-26
Payer: MEDICARE

## 2021-01-26 DIAGNOSIS — Z23 NEED FOR VACCINATION: Primary | ICD-10-CM

## 2021-01-26 PROCEDURE — 91300 COVID-19, MRNA, LNP-S, PF, 30 MCG/0.3 ML DOSE VACCINE: CPT | Mod: PBBFAC | Performed by: INTERNAL MEDICINE

## 2021-01-26 PROCEDURE — 0002A COVID-19, MRNA, LNP-S, PF, 30 MCG/0.3 ML DOSE VACCINE: CPT | Mod: PBBFAC | Performed by: INTERNAL MEDICINE

## 2021-02-12 ENCOUNTER — PATIENT OUTREACH (OUTPATIENT)
Dept: ADMINISTRATIVE | Facility: OTHER | Age: 76
End: 2021-02-12

## 2021-02-17 ENCOUNTER — OFFICE VISIT (OUTPATIENT)
Dept: UROGYNECOLOGY | Facility: CLINIC | Age: 76
End: 2021-02-17
Payer: MEDICARE

## 2021-02-17 VITALS
HEIGHT: 64 IN | HEART RATE: 61 BPM | WEIGHT: 152.38 LBS | DIASTOLIC BLOOD PRESSURE: 67 MMHG | BODY MASS INDEX: 26.02 KG/M2 | SYSTOLIC BLOOD PRESSURE: 110 MMHG

## 2021-02-17 DIAGNOSIS — Z98.890 HISTORY OF UROLOGIC SURGERY: Primary | ICD-10-CM

## 2021-02-17 PROCEDURE — 1126F AMNT PAIN NOTED NONE PRSNT: CPT | Mod: S$GLB,,, | Performed by: OBSTETRICS & GYNECOLOGY

## 2021-02-17 PROCEDURE — 3288F FALL RISK ASSESSMENT DOCD: CPT | Mod: CPTII,S$GLB,, | Performed by: OBSTETRICS & GYNECOLOGY

## 2021-02-17 PROCEDURE — 1101F PT FALLS ASSESS-DOCD LE1/YR: CPT | Mod: CPTII,S$GLB,, | Performed by: OBSTETRICS & GYNECOLOGY

## 2021-02-17 PROCEDURE — 1159F MED LIST DOCD IN RCRD: CPT | Mod: S$GLB,,, | Performed by: OBSTETRICS & GYNECOLOGY

## 2021-02-17 PROCEDURE — 1159F PR MEDICATION LIST DOCUMENTED IN MEDICAL RECORD: ICD-10-PCS | Mod: S$GLB,,, | Performed by: OBSTETRICS & GYNECOLOGY

## 2021-02-17 PROCEDURE — 99999 PR PBB SHADOW E&M-EST. PATIENT-LVL IV: ICD-10-PCS | Mod: PBBFAC,,, | Performed by: OBSTETRICS & GYNECOLOGY

## 2021-02-17 PROCEDURE — 3008F BODY MASS INDEX DOCD: CPT | Mod: CPTII,S$GLB,, | Performed by: OBSTETRICS & GYNECOLOGY

## 2021-02-17 PROCEDURE — 99213 OFFICE O/P EST LOW 20 MIN: CPT | Mod: S$GLB,,, | Performed by: OBSTETRICS & GYNECOLOGY

## 2021-02-17 PROCEDURE — 1101F PR PT FALLS ASSESS DOC 0-1 FALLS W/OUT INJ PAST YR: ICD-10-PCS | Mod: CPTII,S$GLB,, | Performed by: OBSTETRICS & GYNECOLOGY

## 2021-02-17 PROCEDURE — 99213 PR OFFICE/OUTPT VISIT, EST, LEVL III, 20-29 MIN: ICD-10-PCS | Mod: S$GLB,,, | Performed by: OBSTETRICS & GYNECOLOGY

## 2021-02-17 PROCEDURE — 1126F PR PAIN SEVERITY QUANTIFIED, NO PAIN PRESENT: ICD-10-PCS | Mod: S$GLB,,, | Performed by: OBSTETRICS & GYNECOLOGY

## 2021-02-17 PROCEDURE — 3288F PR FALLS RISK ASSESSMENT DOCUMENTED: ICD-10-PCS | Mod: CPTII,S$GLB,, | Performed by: OBSTETRICS & GYNECOLOGY

## 2021-02-17 PROCEDURE — 3008F PR BODY MASS INDEX (BMI) DOCUMENTED: ICD-10-PCS | Mod: CPTII,S$GLB,, | Performed by: OBSTETRICS & GYNECOLOGY

## 2021-02-17 PROCEDURE — 99999 PR PBB SHADOW E&M-EST. PATIENT-LVL IV: CPT | Mod: PBBFAC,,, | Performed by: OBSTETRICS & GYNECOLOGY

## 2021-02-17 RX ORDER — MIRABEGRON 25 MG/1
25 TABLET, FILM COATED, EXTENDED RELEASE ORAL DAILY
COMMUNITY
Start: 2021-02-15 | End: 2021-06-15

## 2021-03-18 ENCOUNTER — PATIENT MESSAGE (OUTPATIENT)
Dept: RESEARCH | Facility: HOSPITAL | Age: 76
End: 2021-03-18

## 2021-03-26 ENCOUNTER — TELEPHONE (OUTPATIENT)
Dept: INTERNAL MEDICINE | Facility: CLINIC | Age: 76
End: 2021-03-26

## 2021-03-26 ENCOUNTER — PATIENT MESSAGE (OUTPATIENT)
Dept: RESEARCH | Facility: HOSPITAL | Age: 76
End: 2021-03-26

## 2021-03-30 ENCOUNTER — OFFICE VISIT (OUTPATIENT)
Dept: INTERNAL MEDICINE | Facility: CLINIC | Age: 76
End: 2021-03-30
Payer: MEDICARE

## 2021-03-30 ENCOUNTER — HOSPITAL ENCOUNTER (OUTPATIENT)
Dept: RADIOLOGY | Facility: HOSPITAL | Age: 76
Discharge: HOME | End: 2021-03-30
Attending: FAMILY MEDICINE
Payer: MEDICARE

## 2021-03-30 ENCOUNTER — LAB VISIT (OUTPATIENT)
Dept: INTERNAL MEDICINE | Facility: CLINIC | Age: 76
End: 2021-03-30
Payer: MEDICARE

## 2021-03-30 VITALS
DIASTOLIC BLOOD PRESSURE: 60 MMHG | HEART RATE: 62 BPM | HEIGHT: 64 IN | TEMPERATURE: 98 F | OXYGEN SATURATION: 97 % | WEIGHT: 144.81 LBS | SYSTOLIC BLOOD PRESSURE: 100 MMHG | BODY MASS INDEX: 24.72 KG/M2

## 2021-03-30 DIAGNOSIS — M54.9 DORSALGIA, UNSPECIFIED: ICD-10-CM

## 2021-03-30 DIAGNOSIS — M54.9 DORSALGIA, UNSPECIFIED: Primary | ICD-10-CM

## 2021-03-30 DIAGNOSIS — M62.830 MUSCLE SPASM OF BACK: ICD-10-CM

## 2021-03-30 LAB
ALBUMIN SERPL BCP-MCNC: 3.1 G/DL (ref 3.5–5.2)
ALP SERPL-CCNC: 27 U/L (ref 55–135)
ALT SERPL W/O P-5'-P-CCNC: 12 U/L (ref 10–44)
ANION GAP SERPL CALC-SCNC: 6 MMOL/L (ref 8–16)
AST SERPL-CCNC: 20 U/L (ref 10–40)
BILIRUB SERPL-MCNC: 0.5 MG/DL (ref 0.1–1)
BUN SERPL-MCNC: 13 MG/DL (ref 8–23)
CALCIUM SERPL-MCNC: 7.9 MG/DL (ref 8.7–10.5)
CHLORIDE SERPL-SCNC: 102 MMOL/L (ref 95–110)
CO2 SERPL-SCNC: 26 MMOL/L (ref 23–29)
CREAT SERPL-MCNC: 0.8 MG/DL (ref 0.5–1.4)
EST. GFR  (AFRICAN AMERICAN): >60 ML/MIN/1.73 M^2
EST. GFR  (NON AFRICAN AMERICAN): >60 ML/MIN/1.73 M^2
GLUCOSE SERPL-MCNC: 84 MG/DL (ref 70–110)
POTASSIUM SERPL-SCNC: 4 MMOL/L (ref 3.5–5.1)
PROT SERPL-MCNC: 5.3 G/DL (ref 6–8.4)
SODIUM SERPL-SCNC: 134 MMOL/L (ref 136–145)

## 2021-03-30 PROCEDURE — 1159F MED LIST DOCD IN RCRD: CPT | Mod: HCNC,S$GLB,, | Performed by: FAMILY MEDICINE

## 2021-03-30 PROCEDURE — 99214 PR OFFICE/OUTPT VISIT, EST, LEVL IV, 30-39 MIN: ICD-10-PCS | Mod: HCNC,S$GLB,, | Performed by: FAMILY MEDICINE

## 2021-03-30 PROCEDURE — 1125F PR PAIN SEVERITY QUANTIFIED, PAIN PRESENT: ICD-10-PCS | Mod: HCNC,S$GLB,, | Performed by: FAMILY MEDICINE

## 2021-03-30 PROCEDURE — 80053 COMPREHEN METABOLIC PANEL: CPT | Mod: HCNC | Performed by: FAMILY MEDICINE

## 2021-03-30 PROCEDURE — 1125F AMNT PAIN NOTED PAIN PRSNT: CPT | Mod: HCNC,S$GLB,, | Performed by: FAMILY MEDICINE

## 2021-03-30 PROCEDURE — 99999 PR PBB SHADOW E&M-EST. PATIENT-LVL V: CPT | Mod: PBBFAC,HCNC,, | Performed by: FAMILY MEDICINE

## 2021-03-30 PROCEDURE — 72110 X-RAY EXAM L-2 SPINE 4/>VWS: CPT | Mod: TC,HCNC,PO

## 2021-03-30 PROCEDURE — 1101F PR PT FALLS ASSESS DOC 0-1 FALLS W/OUT INJ PAST YR: ICD-10-PCS | Mod: HCNC,CPTII,S$GLB, | Performed by: FAMILY MEDICINE

## 2021-03-30 PROCEDURE — 3288F PR FALLS RISK ASSESSMENT DOCUMENTED: ICD-10-PCS | Mod: HCNC,CPTII,S$GLB, | Performed by: FAMILY MEDICINE

## 2021-03-30 PROCEDURE — 72070 XR THORACIC SPINE AP LATERAL: ICD-10-PCS | Mod: 26,HCNC,, | Performed by: RADIOLOGY

## 2021-03-30 PROCEDURE — 3288F FALL RISK ASSESSMENT DOCD: CPT | Mod: HCNC,CPTII,S$GLB, | Performed by: FAMILY MEDICINE

## 2021-03-30 PROCEDURE — 3008F PR BODY MASS INDEX (BMI) DOCUMENTED: ICD-10-PCS | Mod: HCNC,CPTII,S$GLB, | Performed by: FAMILY MEDICINE

## 2021-03-30 PROCEDURE — 72070 X-RAY EXAM THORAC SPINE 2VWS: CPT | Mod: TC,HCNC,PO

## 2021-03-30 PROCEDURE — 99214 OFFICE O/P EST MOD 30 MIN: CPT | Mod: HCNC,S$GLB,, | Performed by: FAMILY MEDICINE

## 2021-03-30 PROCEDURE — 99999 PR PBB SHADOW E&M-EST. PATIENT-LVL V: ICD-10-PCS | Mod: PBBFAC,HCNC,, | Performed by: FAMILY MEDICINE

## 2021-03-30 PROCEDURE — 72110 X-RAY EXAM L-2 SPINE 4/>VWS: CPT | Mod: 26,HCNC,, | Performed by: RADIOLOGY

## 2021-03-30 PROCEDURE — 1101F PT FALLS ASSESS-DOCD LE1/YR: CPT | Mod: HCNC,CPTII,S$GLB, | Performed by: FAMILY MEDICINE

## 2021-03-30 PROCEDURE — 1159F PR MEDICATION LIST DOCUMENTED IN MEDICAL RECORD: ICD-10-PCS | Mod: HCNC,S$GLB,, | Performed by: FAMILY MEDICINE

## 2021-03-30 PROCEDURE — 72070 X-RAY EXAM THORAC SPINE 2VWS: CPT | Mod: 26,HCNC,, | Performed by: RADIOLOGY

## 2021-03-30 PROCEDURE — 3008F BODY MASS INDEX DOCD: CPT | Mod: HCNC,CPTII,S$GLB, | Performed by: FAMILY MEDICINE

## 2021-03-30 PROCEDURE — 72110 XR LUMBAR SPINE 5 VIEW WITH FLEX AND EXT: ICD-10-PCS | Mod: 26,HCNC,, | Performed by: RADIOLOGY

## 2021-03-30 RX ORDER — METHOCARBAMOL 500 MG/1
TABLET, FILM COATED ORAL
Qty: 30 TABLET | Refills: 0 | Status: SHIPPED | OUTPATIENT
Start: 2021-03-30 | End: 2023-12-06 | Stop reason: ALTCHOICE

## 2021-03-30 RX ORDER — MELOXICAM 7.5 MG/1
TABLET ORAL
Qty: 30 TABLET | Refills: 0 | Status: SHIPPED | OUTPATIENT
Start: 2021-03-30 | End: 2021-04-21

## 2021-04-01 ENCOUNTER — TELEPHONE (OUTPATIENT)
Dept: INTERNAL MEDICINE | Facility: CLINIC | Age: 76
End: 2021-04-01

## 2021-04-01 DIAGNOSIS — E55.9 VITAMIN D DEFICIENCY: Primary | ICD-10-CM

## 2021-04-01 DIAGNOSIS — E83.51 HYPOCALCEMIA: ICD-10-CM

## 2021-04-01 DIAGNOSIS — E03.9 ACQUIRED HYPOTHYROIDISM: ICD-10-CM

## 2021-04-15 ENCOUNTER — LAB VISIT (OUTPATIENT)
Dept: LAB | Facility: HOSPITAL | Age: 76
End: 2021-04-15
Attending: FAMILY MEDICINE
Payer: MEDICARE

## 2021-04-15 DIAGNOSIS — E03.9 ACQUIRED HYPOTHYROIDISM: ICD-10-CM

## 2021-04-15 DIAGNOSIS — E83.51 HYPOCALCEMIA: ICD-10-CM

## 2021-04-15 DIAGNOSIS — E55.9 VITAMIN D DEFICIENCY: ICD-10-CM

## 2021-04-15 LAB
25(OH)D3+25(OH)D2 SERPL-MCNC: 37 NG/ML (ref 30–96)
CA-I BLDV-SCNC: 1.3 MMOL/L (ref 1.06–1.42)
MAGNESIUM SERPL-MCNC: 2.1 MG/DL (ref 1.6–2.6)
PTH-INTACT SERPL-MCNC: 65 PG/ML (ref 9–77)
TSH SERPL DL<=0.005 MIU/L-ACNC: 0.81 UIU/ML (ref 0.4–4)

## 2021-04-15 PROCEDURE — 83970 ASSAY OF PARATHORMONE: CPT | Mod: HCNC | Performed by: FAMILY MEDICINE

## 2021-04-15 PROCEDURE — 82306 VITAMIN D 25 HYDROXY: CPT | Mod: HCNC | Performed by: FAMILY MEDICINE

## 2021-04-15 PROCEDURE — 84443 ASSAY THYROID STIM HORMONE: CPT | Mod: HCNC | Performed by: FAMILY MEDICINE

## 2021-04-15 PROCEDURE — 36415 COLL VENOUS BLD VENIPUNCTURE: CPT | Mod: HCNC,PO | Performed by: FAMILY MEDICINE

## 2021-04-15 PROCEDURE — 82330 ASSAY OF CALCIUM: CPT | Mod: HCNC | Performed by: FAMILY MEDICINE

## 2021-04-15 PROCEDURE — 83735 ASSAY OF MAGNESIUM: CPT | Mod: HCNC | Performed by: FAMILY MEDICINE

## 2021-04-19 ENCOUNTER — PATIENT MESSAGE (OUTPATIENT)
Dept: INTERNAL MEDICINE | Facility: CLINIC | Age: 76
End: 2021-04-19

## 2021-04-21 DIAGNOSIS — M54.9 DORSALGIA, UNSPECIFIED: ICD-10-CM

## 2021-04-21 RX ORDER — MELOXICAM 7.5 MG/1
TABLET ORAL
Qty: 30 TABLET | Refills: 0 | Status: SHIPPED | OUTPATIENT
Start: 2021-04-21 | End: 2022-09-01

## 2021-05-03 ENCOUNTER — CLINICAL SUPPORT (OUTPATIENT)
Dept: REHABILITATION | Facility: HOSPITAL | Age: 76
End: 2021-05-03
Attending: FAMILY MEDICINE
Payer: MEDICARE

## 2021-05-03 DIAGNOSIS — R53.1 DECREASED STRENGTH: ICD-10-CM

## 2021-05-03 DIAGNOSIS — M54.9 DORSALGIA, UNSPECIFIED: ICD-10-CM

## 2021-05-03 DIAGNOSIS — M62.830 MUSCLE SPASM OF BACK: ICD-10-CM

## 2021-05-03 PROCEDURE — 97110 THERAPEUTIC EXERCISES: CPT | Mod: HCNC,PO

## 2021-05-03 PROCEDURE — 97161 PT EVAL LOW COMPLEX 20 MIN: CPT | Mod: HCNC,PO

## 2021-05-07 ENCOUNTER — CLINICAL SUPPORT (OUTPATIENT)
Dept: REHABILITATION | Facility: HOSPITAL | Age: 76
End: 2021-05-07
Attending: FAMILY MEDICINE
Payer: MEDICARE

## 2021-05-07 DIAGNOSIS — R53.1 DECREASED STRENGTH: ICD-10-CM

## 2021-05-07 PROCEDURE — 97140 MANUAL THERAPY 1/> REGIONS: CPT | Mod: HCNC,PO

## 2021-05-07 PROCEDURE — 97110 THERAPEUTIC EXERCISES: CPT | Mod: HCNC,PO

## 2021-05-17 ENCOUNTER — CLINICAL SUPPORT (OUTPATIENT)
Dept: REHABILITATION | Facility: HOSPITAL | Age: 76
End: 2021-05-17
Attending: FAMILY MEDICINE
Payer: MEDICARE

## 2021-05-17 DIAGNOSIS — R53.1 DECREASED STRENGTH: ICD-10-CM

## 2021-05-17 PROCEDURE — 97110 THERAPEUTIC EXERCISES: CPT | Mod: HCNC,PO | Performed by: PHYSICAL THERAPIST

## 2021-05-26 ENCOUNTER — CLINICAL SUPPORT (OUTPATIENT)
Dept: REHABILITATION | Facility: HOSPITAL | Age: 76
End: 2021-05-26
Attending: FAMILY MEDICINE
Payer: MEDICARE

## 2021-05-26 ENCOUNTER — PES CALL (OUTPATIENT)
Dept: ADMINISTRATIVE | Facility: CLINIC | Age: 76
End: 2021-05-26

## 2021-05-26 DIAGNOSIS — R53.1 DECREASED STRENGTH: ICD-10-CM

## 2021-05-26 PROCEDURE — 97140 MANUAL THERAPY 1/> REGIONS: CPT | Mod: HCNC,PO

## 2021-05-26 PROCEDURE — 97110 THERAPEUTIC EXERCISES: CPT | Mod: HCNC,PO

## 2021-05-31 ENCOUNTER — CLINICAL SUPPORT (OUTPATIENT)
Dept: REHABILITATION | Facility: HOSPITAL | Age: 76
End: 2021-05-31
Attending: FAMILY MEDICINE
Payer: MEDICARE

## 2021-05-31 DIAGNOSIS — R53.1 DECREASED STRENGTH: ICD-10-CM

## 2021-05-31 PROCEDURE — 97140 MANUAL THERAPY 1/> REGIONS: CPT | Mod: HCNC,PO

## 2021-05-31 PROCEDURE — 97110 THERAPEUTIC EXERCISES: CPT | Mod: HCNC,PO

## 2021-06-10 ENCOUNTER — CLINICAL SUPPORT (OUTPATIENT)
Dept: REHABILITATION | Facility: HOSPITAL | Age: 76
End: 2021-06-10
Attending: FAMILY MEDICINE
Payer: MEDICARE

## 2021-06-10 DIAGNOSIS — R53.1 DECREASED STRENGTH: ICD-10-CM

## 2021-06-10 PROCEDURE — 97110 THERAPEUTIC EXERCISES: CPT | Mod: HCNC,PO,CQ

## 2021-06-14 RX ORDER — ROSUVASTATIN CALCIUM 10 MG/1
10 TABLET, COATED ORAL DAILY
Qty: 90 TABLET | Refills: 3 | Status: SHIPPED | OUTPATIENT
Start: 2021-06-14 | End: 2021-07-07

## 2021-06-17 ENCOUNTER — CLINICAL SUPPORT (OUTPATIENT)
Dept: REHABILITATION | Facility: HOSPITAL | Age: 76
End: 2021-06-17
Attending: FAMILY MEDICINE
Payer: MEDICARE

## 2021-06-17 DIAGNOSIS — R53.1 DECREASED STRENGTH: ICD-10-CM

## 2021-06-17 PROCEDURE — 97110 THERAPEUTIC EXERCISES: CPT | Mod: HCNC,PO,CQ

## 2021-06-28 ENCOUNTER — CLINICAL SUPPORT (OUTPATIENT)
Dept: REHABILITATION | Facility: HOSPITAL | Age: 76
End: 2021-06-28
Attending: FAMILY MEDICINE
Payer: MEDICARE

## 2021-06-28 DIAGNOSIS — R53.1 DECREASED STRENGTH: ICD-10-CM

## 2021-06-28 PROCEDURE — 97110 THERAPEUTIC EXERCISES: CPT | Mod: HCNC,PO | Performed by: PHYSICAL THERAPIST

## 2021-07-08 ENCOUNTER — OFFICE VISIT (OUTPATIENT)
Dept: URGENT CARE | Facility: CLINIC | Age: 76
End: 2021-07-08
Payer: MEDICARE

## 2021-07-08 VITALS
WEIGHT: 144 LBS | BODY MASS INDEX: 24.59 KG/M2 | HEART RATE: 65 BPM | OXYGEN SATURATION: 95 % | TEMPERATURE: 98 F | HEIGHT: 64 IN | DIASTOLIC BLOOD PRESSURE: 67 MMHG | SYSTOLIC BLOOD PRESSURE: 104 MMHG

## 2021-07-08 DIAGNOSIS — J06.9 UPPER RESPIRATORY TRACT INFECTION, UNSPECIFIED TYPE: Primary | ICD-10-CM

## 2021-07-08 DIAGNOSIS — R05.9 COUGH: ICD-10-CM

## 2021-07-08 LAB
CTP QC/QA: YES
SARS-COV-2 RDRP RESP QL NAA+PROBE: NEGATIVE

## 2021-07-08 PROCEDURE — U0002 COVID-19 LAB TEST NON-CDC: HCPCS | Mod: QW,S$GLB,, | Performed by: NURSE PRACTITIONER

## 2021-07-08 PROCEDURE — 3008F BODY MASS INDEX DOCD: CPT | Mod: CPTII,S$GLB,, | Performed by: NURSE PRACTITIONER

## 2021-07-08 PROCEDURE — 99213 OFFICE O/P EST LOW 20 MIN: CPT | Mod: S$GLB,,, | Performed by: NURSE PRACTITIONER

## 2021-07-08 PROCEDURE — 99213 PR OFFICE/OUTPT VISIT, EST, LEVL III, 20-29 MIN: ICD-10-PCS | Mod: S$GLB,,, | Performed by: NURSE PRACTITIONER

## 2021-07-08 PROCEDURE — U0002: ICD-10-PCS | Mod: QW,S$GLB,, | Performed by: NURSE PRACTITIONER

## 2021-07-08 PROCEDURE — 3008F PR BODY MASS INDEX (BMI) DOCUMENTED: ICD-10-PCS | Mod: CPTII,S$GLB,, | Performed by: NURSE PRACTITIONER

## 2021-07-08 RX ORDER — FLUTICASONE PROPIONATE 50 MCG
1 SPRAY, SUSPENSION (ML) NASAL DAILY
Qty: 9.9 ML | Refills: 0 | Status: SHIPPED | OUTPATIENT
Start: 2021-07-08 | End: 2023-03-13

## 2021-07-13 ENCOUNTER — TELEPHONE (OUTPATIENT)
Dept: INTERNAL MEDICINE | Facility: CLINIC | Age: 76
End: 2021-07-13

## 2021-07-13 ENCOUNTER — OFFICE VISIT (OUTPATIENT)
Dept: URGENT CARE | Facility: CLINIC | Age: 76
End: 2021-07-13
Payer: MEDICARE

## 2021-07-13 ENCOUNTER — TELEPHONE (OUTPATIENT)
Dept: UROGYNECOLOGY | Facility: CLINIC | Age: 76
End: 2021-07-13

## 2021-07-13 VITALS
BODY MASS INDEX: 24.59 KG/M2 | SYSTOLIC BLOOD PRESSURE: 115 MMHG | HEIGHT: 64 IN | RESPIRATION RATE: 15 BRPM | WEIGHT: 144 LBS | OXYGEN SATURATION: 92 % | TEMPERATURE: 98 F | DIASTOLIC BLOOD PRESSURE: 70 MMHG | HEART RATE: 74 BPM

## 2021-07-13 DIAGNOSIS — J20.8 ACUTE VIRAL BRONCHITIS: Primary | ICD-10-CM

## 2021-07-13 DIAGNOSIS — R10.9 ACUTE LEFT FLANK PAIN: ICD-10-CM

## 2021-07-13 LAB
BILIRUB UR QL STRIP: NEGATIVE
CTP QC/QA: YES
GLUCOSE UR QL STRIP: NEGATIVE
KETONES UR QL STRIP: NEGATIVE
LEUKOCYTE ESTERASE UR QL STRIP: NEGATIVE
PH, POC UA: 6.5 (ref 5–8)
POC BLOOD, URINE: NEGATIVE
POC NITRATES, URINE: NEGATIVE
PROT UR QL STRIP: NEGATIVE
SARS-COV-2 RDRP RESP QL NAA+PROBE: NEGATIVE
SP GR UR STRIP: 1.01 (ref 1–1.03)
UROBILINOGEN UR STRIP-ACNC: NORMAL (ref 0.1–1.1)

## 2021-07-13 PROCEDURE — U0002: ICD-10-PCS | Mod: QW,S$GLB,, | Performed by: STUDENT IN AN ORGANIZED HEALTH CARE EDUCATION/TRAINING PROGRAM

## 2021-07-13 PROCEDURE — 99214 PR OFFICE/OUTPT VISIT, EST, LEVL IV, 30-39 MIN: ICD-10-PCS | Mod: S$GLB,,, | Performed by: STUDENT IN AN ORGANIZED HEALTH CARE EDUCATION/TRAINING PROGRAM

## 2021-07-13 PROCEDURE — 81003 POCT URINALYSIS, DIPSTICK, AUTOMATED, W/O SCOPE: ICD-10-PCS | Mod: QW,S$GLB,, | Performed by: STUDENT IN AN ORGANIZED HEALTH CARE EDUCATION/TRAINING PROGRAM

## 2021-07-13 PROCEDURE — 81003 URINALYSIS AUTO W/O SCOPE: CPT | Mod: QW,S$GLB,, | Performed by: STUDENT IN AN ORGANIZED HEALTH CARE EDUCATION/TRAINING PROGRAM

## 2021-07-13 PROCEDURE — 99214 OFFICE O/P EST MOD 30 MIN: CPT | Mod: S$GLB,,, | Performed by: STUDENT IN AN ORGANIZED HEALTH CARE EDUCATION/TRAINING PROGRAM

## 2021-07-13 PROCEDURE — 71046 X-RAY EXAM CHEST 2 VIEWS: CPT | Mod: FY,S$GLB,, | Performed by: RADIOLOGY

## 2021-07-13 PROCEDURE — U0002 COVID-19 LAB TEST NON-CDC: HCPCS | Mod: QW,S$GLB,, | Performed by: STUDENT IN AN ORGANIZED HEALTH CARE EDUCATION/TRAINING PROGRAM

## 2021-07-13 PROCEDURE — 74018 RADEX ABDOMEN 1 VIEW: CPT | Mod: FY,S$GLB,, | Performed by: RADIOLOGY

## 2021-07-13 PROCEDURE — 3008F PR BODY MASS INDEX (BMI) DOCUMENTED: ICD-10-PCS | Mod: CPTII,S$GLB,, | Performed by: STUDENT IN AN ORGANIZED HEALTH CARE EDUCATION/TRAINING PROGRAM

## 2021-07-13 PROCEDURE — 3008F BODY MASS INDEX DOCD: CPT | Mod: CPTII,S$GLB,, | Performed by: STUDENT IN AN ORGANIZED HEALTH CARE EDUCATION/TRAINING PROGRAM

## 2021-07-13 PROCEDURE — 71046 XR CHEST PA AND LATERAL: ICD-10-PCS | Mod: FY,S$GLB,, | Performed by: RADIOLOGY

## 2021-07-13 PROCEDURE — 74018 XR KUB: ICD-10-PCS | Mod: FY,S$GLB,, | Performed by: RADIOLOGY

## 2021-07-13 RX ORDER — ALBUTEROL SULFATE 90 UG/1
2 AEROSOL, METERED RESPIRATORY (INHALATION) EVERY 6 HOURS PRN
Qty: 8 G | Refills: 0 | Status: SHIPPED | OUTPATIENT
Start: 2021-07-13 | End: 2021-08-25 | Stop reason: SDUPTHER

## 2021-07-13 RX ORDER — AZITHROMYCIN 250 MG/1
TABLET, FILM COATED ORAL
Qty: 6 TABLET | Refills: 0 | Status: SHIPPED | OUTPATIENT
Start: 2021-07-13 | End: 2021-07-18

## 2021-07-13 RX ORDER — BENZONATATE 100 MG/1
200 CAPSULE ORAL 3 TIMES DAILY PRN
Qty: 30 CAPSULE | Refills: 0 | Status: SHIPPED | OUTPATIENT
Start: 2021-07-13 | End: 2021-07-23

## 2021-07-19 ENCOUNTER — PATIENT MESSAGE (OUTPATIENT)
Dept: ADMINISTRATIVE | Facility: OTHER | Age: 76
End: 2021-07-19

## 2021-07-19 ENCOUNTER — PATIENT OUTREACH (OUTPATIENT)
Dept: ADMINISTRATIVE | Facility: OTHER | Age: 76
End: 2021-07-19

## 2021-07-19 DIAGNOSIS — Z12.31 ENCOUNTER FOR SCREENING MAMMOGRAM FOR MALIGNANT NEOPLASM OF BREAST: Primary | ICD-10-CM

## 2021-07-21 ENCOUNTER — OFFICE VISIT (OUTPATIENT)
Dept: UROGYNECOLOGY | Facility: CLINIC | Age: 76
End: 2021-07-21
Payer: MEDICARE

## 2021-07-21 VITALS
DIASTOLIC BLOOD PRESSURE: 70 MMHG | HEIGHT: 64 IN | BODY MASS INDEX: 25.41 KG/M2 | WEIGHT: 148.81 LBS | SYSTOLIC BLOOD PRESSURE: 100 MMHG

## 2021-07-21 DIAGNOSIS — N39.41 URGENCY INCONTINENCE: ICD-10-CM

## 2021-07-21 DIAGNOSIS — M54.9 DORSALGIA, UNSPECIFIED: ICD-10-CM

## 2021-07-21 DIAGNOSIS — N95.2 ATROPHIC VAGINITIS: Primary | ICD-10-CM

## 2021-07-21 PROCEDURE — 1126F PR PAIN SEVERITY QUANTIFIED, NO PAIN PRESENT: ICD-10-PCS | Mod: HCNC,CPTII,S$GLB, | Performed by: NURSE PRACTITIONER

## 2021-07-21 PROCEDURE — 3008F BODY MASS INDEX DOCD: CPT | Mod: HCNC,CPTII,S$GLB, | Performed by: NURSE PRACTITIONER

## 2021-07-21 PROCEDURE — 87086 URINE CULTURE/COLONY COUNT: CPT | Mod: HCNC | Performed by: NURSE PRACTITIONER

## 2021-07-21 PROCEDURE — 99999 PR PBB SHADOW E&M-EST. PATIENT-LVL V: CPT | Mod: PBBFAC,HCNC,, | Performed by: NURSE PRACTITIONER

## 2021-07-21 PROCEDURE — 1126F AMNT PAIN NOTED NONE PRSNT: CPT | Mod: HCNC,CPTII,S$GLB, | Performed by: NURSE PRACTITIONER

## 2021-07-21 PROCEDURE — 3288F PR FALLS RISK ASSESSMENT DOCUMENTED: ICD-10-PCS | Mod: HCNC,CPTII,S$GLB, | Performed by: NURSE PRACTITIONER

## 2021-07-21 PROCEDURE — 99999 PR PBB SHADOW E&M-EST. PATIENT-LVL V: ICD-10-PCS | Mod: PBBFAC,HCNC,, | Performed by: NURSE PRACTITIONER

## 2021-07-21 PROCEDURE — 99214 OFFICE O/P EST MOD 30 MIN: CPT | Mod: HCNC,S$GLB,, | Performed by: NURSE PRACTITIONER

## 2021-07-21 PROCEDURE — 1159F MED LIST DOCD IN RCRD: CPT | Mod: HCNC,CPTII,S$GLB, | Performed by: NURSE PRACTITIONER

## 2021-07-21 PROCEDURE — 3008F PR BODY MASS INDEX (BMI) DOCUMENTED: ICD-10-PCS | Mod: HCNC,CPTII,S$GLB, | Performed by: NURSE PRACTITIONER

## 2021-07-21 PROCEDURE — 99214 PR OFFICE/OUTPT VISIT, EST, LEVL IV, 30-39 MIN: ICD-10-PCS | Mod: HCNC,S$GLB,, | Performed by: NURSE PRACTITIONER

## 2021-07-21 PROCEDURE — 1101F PR PT FALLS ASSESS DOC 0-1 FALLS W/OUT INJ PAST YR: ICD-10-PCS | Mod: HCNC,CPTII,S$GLB, | Performed by: NURSE PRACTITIONER

## 2021-07-21 PROCEDURE — 3288F FALL RISK ASSESSMENT DOCD: CPT | Mod: HCNC,CPTII,S$GLB, | Performed by: NURSE PRACTITIONER

## 2021-07-21 PROCEDURE — 1159F PR MEDICATION LIST DOCUMENTED IN MEDICAL RECORD: ICD-10-PCS | Mod: HCNC,CPTII,S$GLB, | Performed by: NURSE PRACTITIONER

## 2021-07-21 PROCEDURE — 1101F PT FALLS ASSESS-DOCD LE1/YR: CPT | Mod: HCNC,CPTII,S$GLB, | Performed by: NURSE PRACTITIONER

## 2021-07-22 LAB — BACTERIA UR CULT: NO GROWTH

## 2021-07-23 ENCOUNTER — PATIENT MESSAGE (OUTPATIENT)
Dept: UROGYNECOLOGY | Facility: CLINIC | Age: 76
End: 2021-07-23

## 2021-07-23 ENCOUNTER — TELEPHONE (OUTPATIENT)
Dept: UROGYNECOLOGY | Facility: CLINIC | Age: 76
End: 2021-07-23

## 2021-07-23 RX ORDER — DIAZEPAM 2 MG/1
2 TABLET ORAL EVERY 8 HOURS PRN
Qty: 5 TABLET | Refills: 0 | Status: SHIPPED | OUTPATIENT
Start: 2021-07-23 | End: 2021-12-29

## 2021-07-30 ENCOUNTER — PATIENT MESSAGE (OUTPATIENT)
Dept: RADIOLOGY | Facility: HOSPITAL | Age: 76
End: 2021-07-30

## 2021-08-12 ENCOUNTER — HOSPITAL ENCOUNTER (OUTPATIENT)
Dept: RADIOLOGY | Facility: HOSPITAL | Age: 76
Discharge: HOME | End: 2021-08-12
Attending: FAMILY MEDICINE
Payer: MEDICARE

## 2021-08-12 DIAGNOSIS — Z12.31 ENCOUNTER FOR SCREENING MAMMOGRAM FOR MALIGNANT NEOPLASM OF BREAST: ICD-10-CM

## 2021-08-12 PROCEDURE — 77063 BREAST TOMOSYNTHESIS BI: CPT | Mod: 26,,, | Performed by: RADIOLOGY

## 2021-08-12 PROCEDURE — 77063 MAMMO DIGITAL SCREENING BILAT WITH TOMO: ICD-10-PCS | Mod: 26,,, | Performed by: RADIOLOGY

## 2021-08-12 PROCEDURE — 77067 SCR MAMMO BI INCL CAD: CPT | Mod: 26,,, | Performed by: RADIOLOGY

## 2021-08-12 PROCEDURE — 77067 SCR MAMMO BI INCL CAD: CPT | Mod: TC,PO

## 2021-08-12 PROCEDURE — 77067 MAMMO DIGITAL SCREENING BILAT WITH TOMO: ICD-10-PCS | Mod: 26,,, | Performed by: RADIOLOGY

## 2021-08-16 ENCOUNTER — HOSPITAL ENCOUNTER (OUTPATIENT)
Dept: RADIOLOGY | Facility: HOSPITAL | Age: 76
Discharge: HOME | End: 2021-08-16
Attending: NURSE PRACTITIONER
Payer: MEDICARE

## 2021-08-16 ENCOUNTER — PATIENT MESSAGE (OUTPATIENT)
Dept: INTERNAL MEDICINE | Facility: CLINIC | Age: 76
End: 2021-08-16

## 2021-08-16 ENCOUNTER — OFFICE VISIT (OUTPATIENT)
Dept: INTERNAL MEDICINE | Facility: CLINIC | Age: 76
End: 2021-08-16
Payer: MEDICARE

## 2021-08-16 ENCOUNTER — TELEPHONE (OUTPATIENT)
Dept: INTERNAL MEDICINE | Facility: CLINIC | Age: 76
End: 2021-08-16

## 2021-08-16 VITALS
RESPIRATION RATE: 16 BRPM | BODY MASS INDEX: 25.41 KG/M2 | WEIGHT: 148.81 LBS | TEMPERATURE: 97 F | HEART RATE: 82 BPM | HEIGHT: 64 IN | DIASTOLIC BLOOD PRESSURE: 62 MMHG | SYSTOLIC BLOOD PRESSURE: 102 MMHG

## 2021-08-16 DIAGNOSIS — R68.83 CHILLS: ICD-10-CM

## 2021-08-16 DIAGNOSIS — M79.10 MUSCLE PAIN: ICD-10-CM

## 2021-08-16 DIAGNOSIS — R06.2 WHEEZE: ICD-10-CM

## 2021-08-16 DIAGNOSIS — J40 BRONCHITIS: ICD-10-CM

## 2021-08-16 DIAGNOSIS — R05.9 COUGH: ICD-10-CM

## 2021-08-16 DIAGNOSIS — R06.02 SOB (SHORTNESS OF BREATH): ICD-10-CM

## 2021-08-16 DIAGNOSIS — J02.9 SORE THROAT: ICD-10-CM

## 2021-08-16 DIAGNOSIS — R05.9 COUGH: Primary | ICD-10-CM

## 2021-08-16 DIAGNOSIS — G44.219 EPISODIC TENSION-TYPE HEADACHE, NOT INTRACTABLE: ICD-10-CM

## 2021-08-16 PROCEDURE — 1126F PR PAIN SEVERITY QUANTIFIED, NO PAIN PRESENT: ICD-10-PCS | Mod: CPTII,S$GLB,, | Performed by: NURSE PRACTITIONER

## 2021-08-16 PROCEDURE — 3288F PR FALLS RISK ASSESSMENT DOCUMENTED: ICD-10-PCS | Mod: CPTII,S$GLB,, | Performed by: NURSE PRACTITIONER

## 2021-08-16 PROCEDURE — 3074F SYST BP LT 130 MM HG: CPT | Mod: CPTII,S$GLB,, | Performed by: NURSE PRACTITIONER

## 2021-08-16 PROCEDURE — 1101F PR PT FALLS ASSESS DOC 0-1 FALLS W/OUT INJ PAST YR: ICD-10-PCS | Mod: CPTII,S$GLB,, | Performed by: NURSE PRACTITIONER

## 2021-08-16 PROCEDURE — 71046 X-RAY EXAM CHEST 2 VIEWS: CPT | Mod: 26,,, | Performed by: RADIOLOGY

## 2021-08-16 PROCEDURE — 99999 PR PBB SHADOW E&M-EST. PATIENT-LVL IV: ICD-10-PCS | Mod: PBBFAC,,, | Performed by: NURSE PRACTITIONER

## 2021-08-16 PROCEDURE — 99999 PR PBB SHADOW E&M-EST. PATIENT-LVL IV: CPT | Mod: PBBFAC,,, | Performed by: NURSE PRACTITIONER

## 2021-08-16 PROCEDURE — 71046 X-RAY EXAM CHEST 2 VIEWS: CPT | Mod: TC,PO

## 2021-08-16 PROCEDURE — 99214 OFFICE O/P EST MOD 30 MIN: CPT | Mod: S$GLB,,, | Performed by: NURSE PRACTITIONER

## 2021-08-16 PROCEDURE — 3074F PR MOST RECENT SYSTOLIC BLOOD PRESSURE < 130 MM HG: ICD-10-PCS | Mod: CPTII,S$GLB,, | Performed by: NURSE PRACTITIONER

## 2021-08-16 PROCEDURE — 71046 XR CHEST PA AND LATERAL: ICD-10-PCS | Mod: 26,,, | Performed by: RADIOLOGY

## 2021-08-16 PROCEDURE — 3078F PR MOST RECENT DIASTOLIC BLOOD PRESSURE < 80 MM HG: ICD-10-PCS | Mod: CPTII,S$GLB,, | Performed by: NURSE PRACTITIONER

## 2021-08-16 PROCEDURE — 3008F PR BODY MASS INDEX (BMI) DOCUMENTED: ICD-10-PCS | Mod: CPTII,S$GLB,, | Performed by: NURSE PRACTITIONER

## 2021-08-16 PROCEDURE — 3008F BODY MASS INDEX DOCD: CPT | Mod: CPTII,S$GLB,, | Performed by: NURSE PRACTITIONER

## 2021-08-16 PROCEDURE — 1101F PT FALLS ASSESS-DOCD LE1/YR: CPT | Mod: CPTII,S$GLB,, | Performed by: NURSE PRACTITIONER

## 2021-08-16 PROCEDURE — U0003 INFECTIOUS AGENT DETECTION BY NUCLEIC ACID (DNA OR RNA); SEVERE ACUTE RESPIRATORY SYNDROME CORONAVIRUS 2 (SARS-COV-2) (CORONAVIRUS DISEASE [COVID-19]), AMPLIFIED PROBE TECHNIQUE, MAKING USE OF HIGH THROUGHPUT TECHNOLOGIES AS DESCRIBED BY CMS-2020-01-R: HCPCS | Performed by: NURSE PRACTITIONER

## 2021-08-16 PROCEDURE — 1159F MED LIST DOCD IN RCRD: CPT | Mod: CPTII,S$GLB,, | Performed by: NURSE PRACTITIONER

## 2021-08-16 PROCEDURE — 1126F AMNT PAIN NOTED NONE PRSNT: CPT | Mod: CPTII,S$GLB,, | Performed by: NURSE PRACTITIONER

## 2021-08-16 PROCEDURE — 3288F FALL RISK ASSESSMENT DOCD: CPT | Mod: CPTII,S$GLB,, | Performed by: NURSE PRACTITIONER

## 2021-08-16 PROCEDURE — 99214 PR OFFICE/OUTPT VISIT, EST, LEVL IV, 30-39 MIN: ICD-10-PCS | Mod: S$GLB,,, | Performed by: NURSE PRACTITIONER

## 2021-08-16 PROCEDURE — 3078F DIAST BP <80 MM HG: CPT | Mod: CPTII,S$GLB,, | Performed by: NURSE PRACTITIONER

## 2021-08-16 PROCEDURE — U0005 INFEC AGEN DETEC AMPLI PROBE: HCPCS | Performed by: NURSE PRACTITIONER

## 2021-08-16 PROCEDURE — 1159F PR MEDICATION LIST DOCUMENTED IN MEDICAL RECORD: ICD-10-PCS | Mod: CPTII,S$GLB,, | Performed by: NURSE PRACTITIONER

## 2021-08-16 RX ORDER — DOXYCYCLINE HYCLATE 100 MG
100 TABLET ORAL 2 TIMES DAILY
Qty: 14 TABLET | Refills: 0 | Status: SHIPPED | OUTPATIENT
Start: 2021-08-16 | End: 2021-08-23

## 2021-08-16 RX ORDER — CODEINE PHOSPHATE AND GUAIFENESIN 10; 100 MG/5ML; MG/5ML
5 SOLUTION ORAL 3 TIMES DAILY PRN
Qty: 118 ML | Refills: 0 | Status: SHIPPED | OUTPATIENT
Start: 2021-08-16 | End: 2021-08-26

## 2021-08-17 ENCOUNTER — TELEPHONE (OUTPATIENT)
Dept: INTERNAL MEDICINE | Facility: CLINIC | Age: 76
End: 2021-08-17

## 2021-08-17 LAB
SARS-COV-2 RNA RESP QL NAA+PROBE: NOT DETECTED
SARS-COV-2- CYCLE NUMBER: -1

## 2021-08-25 ENCOUNTER — OFFICE VISIT (OUTPATIENT)
Dept: INTERNAL MEDICINE | Facility: CLINIC | Age: 76
End: 2021-08-25
Payer: MEDICARE

## 2021-08-25 VITALS
SYSTOLIC BLOOD PRESSURE: 110 MMHG | HEIGHT: 64 IN | TEMPERATURE: 97 F | OXYGEN SATURATION: 95 % | WEIGHT: 149.69 LBS | DIASTOLIC BLOOD PRESSURE: 70 MMHG | BODY MASS INDEX: 25.56 KG/M2 | HEART RATE: 67 BPM

## 2021-08-25 DIAGNOSIS — R05.9 COUGH: ICD-10-CM

## 2021-08-25 DIAGNOSIS — J40 BRONCHITIS: Primary | ICD-10-CM

## 2021-08-25 PROCEDURE — 1126F AMNT PAIN NOTED NONE PRSNT: CPT | Mod: HCNC,CPTII,S$GLB, | Performed by: NURSE PRACTITIONER

## 2021-08-25 PROCEDURE — 99999 PR PBB SHADOW E&M-EST. PATIENT-LVL IV: ICD-10-PCS | Mod: PBBFAC,HCNC,, | Performed by: NURSE PRACTITIONER

## 2021-08-25 PROCEDURE — 1159F PR MEDICATION LIST DOCUMENTED IN MEDICAL RECORD: ICD-10-PCS | Mod: HCNC,CPTII,S$GLB, | Performed by: NURSE PRACTITIONER

## 2021-08-25 PROCEDURE — 99999 PR PBB SHADOW E&M-EST. PATIENT-LVL IV: CPT | Mod: PBBFAC,HCNC,, | Performed by: NURSE PRACTITIONER

## 2021-08-25 PROCEDURE — 3008F BODY MASS INDEX DOCD: CPT | Mod: HCNC,CPTII,S$GLB, | Performed by: NURSE PRACTITIONER

## 2021-08-25 PROCEDURE — 3078F DIAST BP <80 MM HG: CPT | Mod: HCNC,CPTII,S$GLB, | Performed by: NURSE PRACTITIONER

## 2021-08-25 PROCEDURE — 99214 OFFICE O/P EST MOD 30 MIN: CPT | Mod: HCNC,25,S$GLB, | Performed by: NURSE PRACTITIONER

## 2021-08-25 PROCEDURE — 3288F PR FALLS RISK ASSESSMENT DOCUMENTED: ICD-10-PCS | Mod: HCNC,CPTII,S$GLB, | Performed by: NURSE PRACTITIONER

## 2021-08-25 PROCEDURE — 96372 PR INJECTION,THERAP/PROPH/DIAG2ST, IM OR SUBCUT: ICD-10-PCS | Mod: HCNC,S$GLB,, | Performed by: NURSE PRACTITIONER

## 2021-08-25 PROCEDURE — 3074F PR MOST RECENT SYSTOLIC BLOOD PRESSURE < 130 MM HG: ICD-10-PCS | Mod: HCNC,CPTII,S$GLB, | Performed by: NURSE PRACTITIONER

## 2021-08-25 PROCEDURE — 3074F SYST BP LT 130 MM HG: CPT | Mod: HCNC,CPTII,S$GLB, | Performed by: NURSE PRACTITIONER

## 2021-08-25 PROCEDURE — 99214 PR OFFICE/OUTPT VISIT, EST, LEVL IV, 30-39 MIN: ICD-10-PCS | Mod: HCNC,25,S$GLB, | Performed by: NURSE PRACTITIONER

## 2021-08-25 PROCEDURE — 3078F PR MOST RECENT DIASTOLIC BLOOD PRESSURE < 80 MM HG: ICD-10-PCS | Mod: HCNC,CPTII,S$GLB, | Performed by: NURSE PRACTITIONER

## 2021-08-25 PROCEDURE — 1159F MED LIST DOCD IN RCRD: CPT | Mod: HCNC,CPTII,S$GLB, | Performed by: NURSE PRACTITIONER

## 2021-08-25 PROCEDURE — 3008F PR BODY MASS INDEX (BMI) DOCUMENTED: ICD-10-PCS | Mod: HCNC,CPTII,S$GLB, | Performed by: NURSE PRACTITIONER

## 2021-08-25 PROCEDURE — 1126F PR PAIN SEVERITY QUANTIFIED, NO PAIN PRESENT: ICD-10-PCS | Mod: HCNC,CPTII,S$GLB, | Performed by: NURSE PRACTITIONER

## 2021-08-25 PROCEDURE — 1101F PR PT FALLS ASSESS DOC 0-1 FALLS W/OUT INJ PAST YR: ICD-10-PCS | Mod: HCNC,CPTII,S$GLB, | Performed by: NURSE PRACTITIONER

## 2021-08-25 PROCEDURE — 96372 THER/PROPH/DIAG INJ SC/IM: CPT | Mod: HCNC,S$GLB,, | Performed by: NURSE PRACTITIONER

## 2021-08-25 PROCEDURE — 1101F PT FALLS ASSESS-DOCD LE1/YR: CPT | Mod: HCNC,CPTII,S$GLB, | Performed by: NURSE PRACTITIONER

## 2021-08-25 PROCEDURE — 3288F FALL RISK ASSESSMENT DOCD: CPT | Mod: HCNC,CPTII,S$GLB, | Performed by: NURSE PRACTITIONER

## 2021-08-25 RX ORDER — AMOXICILLIN AND CLAVULANATE POTASSIUM 875; 125 MG/1; MG/1
1 TABLET, FILM COATED ORAL EVERY 12 HOURS
Qty: 14 TABLET | Refills: 0 | Status: SHIPPED | OUTPATIENT
Start: 2021-08-25 | End: 2021-08-25

## 2021-08-25 RX ORDER — AMOXICILLIN AND CLAVULANATE POTASSIUM 875; 125 MG/1; MG/1
1 TABLET, FILM COATED ORAL EVERY 12 HOURS
Qty: 14 TABLET | Refills: 0 | Status: SHIPPED | OUTPATIENT
Start: 2021-08-25 | End: 2021-09-01

## 2021-08-25 RX ORDER — ALBUTEROL SULFATE 90 UG/1
2 AEROSOL, METERED RESPIRATORY (INHALATION) EVERY 6 HOURS PRN
Qty: 8 G | Refills: 3 | Status: SHIPPED | OUTPATIENT
Start: 2021-08-25 | End: 2023-03-13

## 2021-08-25 RX ORDER — TRIAMCINOLONE ACETONIDE 40 MG/ML
40 INJECTION, SUSPENSION INTRA-ARTICULAR; INTRAMUSCULAR ONCE
Status: COMPLETED | OUTPATIENT
Start: 2021-08-25 | End: 2021-08-25

## 2021-08-25 RX ORDER — ALBUTEROL SULFATE 90 UG/1
2 AEROSOL, METERED RESPIRATORY (INHALATION) EVERY 6 HOURS PRN
Qty: 8 G | Refills: 3 | Status: SHIPPED | OUTPATIENT
Start: 2021-08-25 | End: 2021-08-25

## 2021-08-25 RX ADMIN — TRIAMCINOLONE ACETONIDE 40 MG: 40 INJECTION, SUSPENSION INTRA-ARTICULAR; INTRAMUSCULAR at 03:08

## 2021-09-02 ENCOUNTER — PATIENT MESSAGE (OUTPATIENT)
Dept: OBSTETRICS AND GYNECOLOGY | Facility: CLINIC | Age: 76
End: 2021-09-02

## 2021-09-02 RX ORDER — ESTRADIOL 0.5 MG/1
0.5 TABLET ORAL DAILY
Qty: 90 TABLET | Refills: 0 | Status: SHIPPED | OUTPATIENT
Start: 2021-09-02 | End: 2021-10-27

## 2021-09-03 ENCOUNTER — PATIENT MESSAGE (OUTPATIENT)
Dept: OBSTETRICS AND GYNECOLOGY | Facility: CLINIC | Age: 76
End: 2021-09-03

## 2021-09-14 ENCOUNTER — OFFICE VISIT (OUTPATIENT)
Dept: OTOLARYNGOLOGY | Facility: CLINIC | Age: 76
End: 2021-09-14
Payer: MEDICARE

## 2021-09-14 VITALS
WEIGHT: 149 LBS | TEMPERATURE: 98 F | BODY MASS INDEX: 25.44 KG/M2 | HEART RATE: 62 BPM | DIASTOLIC BLOOD PRESSURE: 67 MMHG | SYSTOLIC BLOOD PRESSURE: 96 MMHG | HEIGHT: 64 IN

## 2021-09-14 DIAGNOSIS — R51.9 FACIAL PAIN: ICD-10-CM

## 2021-09-14 DIAGNOSIS — K21.9 LPRD (LARYNGOPHARYNGEAL REFLUX DISEASE): Primary | ICD-10-CM

## 2021-09-14 DIAGNOSIS — R51.9 HEADACHE, UNSPECIFIED HEADACHE TYPE: ICD-10-CM

## 2021-09-14 PROCEDURE — 3288F PR FALLS RISK ASSESSMENT DOCUMENTED: ICD-10-PCS | Mod: CPTII,S$GLB,, | Performed by: OTOLARYNGOLOGY

## 2021-09-14 PROCEDURE — 99204 OFFICE O/P NEW MOD 45 MIN: CPT | Mod: S$GLB,,, | Performed by: OTOLARYNGOLOGY

## 2021-09-14 PROCEDURE — 3078F PR MOST RECENT DIASTOLIC BLOOD PRESSURE < 80 MM HG: ICD-10-PCS | Mod: CPTII,S$GLB,, | Performed by: OTOLARYNGOLOGY

## 2021-09-14 PROCEDURE — 1159F MED LIST DOCD IN RCRD: CPT | Mod: CPTII,S$GLB,, | Performed by: OTOLARYNGOLOGY

## 2021-09-14 PROCEDURE — 3008F BODY MASS INDEX DOCD: CPT | Mod: CPTII,S$GLB,, | Performed by: OTOLARYNGOLOGY

## 2021-09-14 PROCEDURE — 1160F RVW MEDS BY RX/DR IN RCRD: CPT | Mod: CPTII,S$GLB,, | Performed by: OTOLARYNGOLOGY

## 2021-09-14 PROCEDURE — 3074F PR MOST RECENT SYSTOLIC BLOOD PRESSURE < 130 MM HG: ICD-10-PCS | Mod: CPTII,S$GLB,, | Performed by: OTOLARYNGOLOGY

## 2021-09-14 PROCEDURE — 3078F DIAST BP <80 MM HG: CPT | Mod: CPTII,S$GLB,, | Performed by: OTOLARYNGOLOGY

## 2021-09-14 PROCEDURE — 1160F PR REVIEW ALL MEDS BY PRESCRIBER/CLIN PHARMACIST DOCUMENTED: ICD-10-PCS | Mod: CPTII,S$GLB,, | Performed by: OTOLARYNGOLOGY

## 2021-09-14 PROCEDURE — 1125F AMNT PAIN NOTED PAIN PRSNT: CPT | Mod: CPTII,S$GLB,, | Performed by: OTOLARYNGOLOGY

## 2021-09-14 PROCEDURE — 1101F PR PT FALLS ASSESS DOC 0-1 FALLS W/OUT INJ PAST YR: ICD-10-PCS | Mod: CPTII,S$GLB,, | Performed by: OTOLARYNGOLOGY

## 2021-09-14 PROCEDURE — 99204 PR OFFICE/OUTPT VISIT, NEW, LEVL IV, 45-59 MIN: ICD-10-PCS | Mod: S$GLB,,, | Performed by: OTOLARYNGOLOGY

## 2021-09-14 PROCEDURE — 3288F FALL RISK ASSESSMENT DOCD: CPT | Mod: CPTII,S$GLB,, | Performed by: OTOLARYNGOLOGY

## 2021-09-14 PROCEDURE — 3074F SYST BP LT 130 MM HG: CPT | Mod: CPTII,S$GLB,, | Performed by: OTOLARYNGOLOGY

## 2021-09-14 PROCEDURE — 1159F PR MEDICATION LIST DOCUMENTED IN MEDICAL RECORD: ICD-10-PCS | Mod: CPTII,S$GLB,, | Performed by: OTOLARYNGOLOGY

## 2021-09-14 PROCEDURE — 1125F PR PAIN SEVERITY QUANTIFIED, PAIN PRESENT: ICD-10-PCS | Mod: CPTII,S$GLB,, | Performed by: OTOLARYNGOLOGY

## 2021-09-14 PROCEDURE — 1101F PT FALLS ASSESS-DOCD LE1/YR: CPT | Mod: CPTII,S$GLB,, | Performed by: OTOLARYNGOLOGY

## 2021-09-14 PROCEDURE — 3008F PR BODY MASS INDEX (BMI) DOCUMENTED: ICD-10-PCS | Mod: CPTII,S$GLB,, | Performed by: OTOLARYNGOLOGY

## 2021-10-26 ENCOUNTER — PATIENT OUTREACH (OUTPATIENT)
Dept: ADMINISTRATIVE | Facility: OTHER | Age: 76
End: 2021-10-26
Payer: MEDICARE

## 2021-10-27 ENCOUNTER — OFFICE VISIT (OUTPATIENT)
Dept: OBSTETRICS AND GYNECOLOGY | Facility: CLINIC | Age: 76
End: 2021-10-27
Attending: OBSTETRICS & GYNECOLOGY
Payer: MEDICARE

## 2021-10-27 VITALS
BODY MASS INDEX: 25.25 KG/M2 | DIASTOLIC BLOOD PRESSURE: 50 MMHG | WEIGHT: 147.94 LBS | HEIGHT: 64 IN | SYSTOLIC BLOOD PRESSURE: 100 MMHG

## 2021-10-27 DIAGNOSIS — Z79.890 NEED FOR PROPHYLACTIC HORMONE REPLACEMENT THERAPY (POSTMENOPAUSAL): ICD-10-CM

## 2021-10-27 DIAGNOSIS — Z01.419 WELL FEMALE EXAM WITH ROUTINE GYNECOLOGICAL EXAM: Primary | ICD-10-CM

## 2021-10-27 PROBLEM — Z98.890 STATUS POST CREATION OF URETHRAL SLING BY SUPRAPUBIC APPROACH: Status: RESOLVED | Noted: 2020-08-21 | Resolved: 2021-10-27

## 2021-10-27 PROCEDURE — 3074F SYST BP LT 130 MM HG: CPT | Mod: HCNC,CPTII,S$GLB, | Performed by: OBSTETRICS & GYNECOLOGY

## 2021-10-27 PROCEDURE — 99999 PR PBB SHADOW E&M-EST. PATIENT-LVL III: ICD-10-PCS | Mod: PBBFAC,HCNC,, | Performed by: OBSTETRICS & GYNECOLOGY

## 2021-10-27 PROCEDURE — G0101 PR CA SCREEN;PELVIC/BREAST EXAM: ICD-10-PCS | Mod: HCNC,S$GLB,, | Performed by: OBSTETRICS & GYNECOLOGY

## 2021-10-27 PROCEDURE — 3288F PR FALLS RISK ASSESSMENT DOCUMENTED: ICD-10-PCS | Mod: HCNC,CPTII,S$GLB, | Performed by: OBSTETRICS & GYNECOLOGY

## 2021-10-27 PROCEDURE — G0101 CA SCREEN;PELVIC/BREAST EXAM: HCPCS | Mod: HCNC,S$GLB,, | Performed by: OBSTETRICS & GYNECOLOGY

## 2021-10-27 PROCEDURE — 1159F MED LIST DOCD IN RCRD: CPT | Mod: HCNC,CPTII,S$GLB, | Performed by: OBSTETRICS & GYNECOLOGY

## 2021-10-27 PROCEDURE — 3074F PR MOST RECENT SYSTOLIC BLOOD PRESSURE < 130 MM HG: ICD-10-PCS | Mod: HCNC,CPTII,S$GLB, | Performed by: OBSTETRICS & GYNECOLOGY

## 2021-10-27 PROCEDURE — 3078F PR MOST RECENT DIASTOLIC BLOOD PRESSURE < 80 MM HG: ICD-10-PCS | Mod: HCNC,CPTII,S$GLB, | Performed by: OBSTETRICS & GYNECOLOGY

## 2021-10-27 PROCEDURE — 1126F PR PAIN SEVERITY QUANTIFIED, NO PAIN PRESENT: ICD-10-PCS | Mod: HCNC,CPTII,S$GLB, | Performed by: OBSTETRICS & GYNECOLOGY

## 2021-10-27 PROCEDURE — 3288F FALL RISK ASSESSMENT DOCD: CPT | Mod: HCNC,CPTII,S$GLB, | Performed by: OBSTETRICS & GYNECOLOGY

## 2021-10-27 PROCEDURE — 1101F PR PT FALLS ASSESS DOC 0-1 FALLS W/OUT INJ PAST YR: ICD-10-PCS | Mod: HCNC,CPTII,S$GLB, | Performed by: OBSTETRICS & GYNECOLOGY

## 2021-10-27 PROCEDURE — 1159F PR MEDICATION LIST DOCUMENTED IN MEDICAL RECORD: ICD-10-PCS | Mod: HCNC,CPTII,S$GLB, | Performed by: OBSTETRICS & GYNECOLOGY

## 2021-10-27 PROCEDURE — 1160F RVW MEDS BY RX/DR IN RCRD: CPT | Mod: HCNC,CPTII,S$GLB, | Performed by: OBSTETRICS & GYNECOLOGY

## 2021-10-27 PROCEDURE — 1101F PT FALLS ASSESS-DOCD LE1/YR: CPT | Mod: HCNC,CPTII,S$GLB, | Performed by: OBSTETRICS & GYNECOLOGY

## 2021-10-27 PROCEDURE — 1126F AMNT PAIN NOTED NONE PRSNT: CPT | Mod: HCNC,CPTII,S$GLB, | Performed by: OBSTETRICS & GYNECOLOGY

## 2021-10-27 PROCEDURE — 1160F PR REVIEW ALL MEDS BY PRESCRIBER/CLIN PHARMACIST DOCUMENTED: ICD-10-PCS | Mod: HCNC,CPTII,S$GLB, | Performed by: OBSTETRICS & GYNECOLOGY

## 2021-10-27 PROCEDURE — 99999 PR PBB SHADOW E&M-EST. PATIENT-LVL III: CPT | Mod: PBBFAC,HCNC,, | Performed by: OBSTETRICS & GYNECOLOGY

## 2021-10-27 PROCEDURE — 3078F DIAST BP <80 MM HG: CPT | Mod: HCNC,CPTII,S$GLB, | Performed by: OBSTETRICS & GYNECOLOGY

## 2021-10-27 RX ORDER — ESTRADIOL 0.5 MG/1
0.5 TABLET ORAL DAILY
Qty: 90 TABLET | Refills: 3 | Status: SHIPPED | OUTPATIENT
Start: 2021-10-27 | End: 2023-03-13 | Stop reason: SDUPTHER

## 2021-10-27 RX ORDER — MIRABEGRON 25 MG/1
25 TABLET, FILM COATED, EXTENDED RELEASE ORAL DAILY
Qty: 90 TABLET | Refills: 3 | Status: SHIPPED | OUTPATIENT
Start: 2021-10-27 | End: 2023-01-02 | Stop reason: SDUPTHER

## 2021-11-10 ENCOUNTER — PES CALL (OUTPATIENT)
Dept: ADMINISTRATIVE | Facility: CLINIC | Age: 76
End: 2021-11-10
Payer: MEDICARE

## 2021-11-15 ENCOUNTER — OFFICE VISIT (OUTPATIENT)
Dept: INTERNAL MEDICINE | Facility: CLINIC | Age: 76
End: 2021-11-15
Payer: MEDICARE

## 2021-11-15 VITALS
OXYGEN SATURATION: 96 % | HEIGHT: 64 IN | BODY MASS INDEX: 25.25 KG/M2 | HEART RATE: 67 BPM | SYSTOLIC BLOOD PRESSURE: 110 MMHG | DIASTOLIC BLOOD PRESSURE: 64 MMHG | WEIGHT: 147.94 LBS

## 2021-11-15 DIAGNOSIS — E03.9 ACQUIRED HYPOTHYROIDISM: ICD-10-CM

## 2021-11-15 DIAGNOSIS — E78.5 HYPERLIPIDEMIA, UNSPECIFIED HYPERLIPIDEMIA TYPE: Primary | ICD-10-CM

## 2021-11-15 DIAGNOSIS — J90 PLEURAL EFFUSION, BILATERAL: ICD-10-CM

## 2021-11-15 DIAGNOSIS — E55.9 VITAMIN D DEFICIENCY: ICD-10-CM

## 2021-11-15 DIAGNOSIS — J98.4 LUNG DISORDER: ICD-10-CM

## 2021-11-15 PROCEDURE — 90694 VACC AIIV4 NO PRSRV 0.5ML IM: CPT | Mod: HCNC,S$GLB,, | Performed by: FAMILY MEDICINE

## 2021-11-15 PROCEDURE — 3078F PR MOST RECENT DIASTOLIC BLOOD PRESSURE < 80 MM HG: ICD-10-PCS | Mod: HCNC,CPTII,S$GLB, | Performed by: FAMILY MEDICINE

## 2021-11-15 PROCEDURE — 99999 PR PBB SHADOW E&M-EST. PATIENT-LVL III: CPT | Mod: PBBFAC,HCNC,, | Performed by: FAMILY MEDICINE

## 2021-11-15 PROCEDURE — 3078F DIAST BP <80 MM HG: CPT | Mod: HCNC,CPTII,S$GLB, | Performed by: FAMILY MEDICINE

## 2021-11-15 PROCEDURE — 3288F PR FALLS RISK ASSESSMENT DOCUMENTED: ICD-10-PCS | Mod: HCNC,CPTII,S$GLB, | Performed by: FAMILY MEDICINE

## 2021-11-15 PROCEDURE — 3288F FALL RISK ASSESSMENT DOCD: CPT | Mod: HCNC,CPTII,S$GLB, | Performed by: FAMILY MEDICINE

## 2021-11-15 PROCEDURE — G0008 ADMIN INFLUENZA VIRUS VAC: HCPCS | Mod: HCNC,S$GLB,, | Performed by: FAMILY MEDICINE

## 2021-11-15 PROCEDURE — 3074F PR MOST RECENT SYSTOLIC BLOOD PRESSURE < 130 MM HG: ICD-10-PCS | Mod: HCNC,CPTII,S$GLB, | Performed by: FAMILY MEDICINE

## 2021-11-15 PROCEDURE — 1126F PR PAIN SEVERITY QUANTIFIED, NO PAIN PRESENT: ICD-10-PCS | Mod: HCNC,CPTII,S$GLB, | Performed by: FAMILY MEDICINE

## 2021-11-15 PROCEDURE — 1101F PT FALLS ASSESS-DOCD LE1/YR: CPT | Mod: HCNC,CPTII,S$GLB, | Performed by: FAMILY MEDICINE

## 2021-11-15 PROCEDURE — 3074F SYST BP LT 130 MM HG: CPT | Mod: HCNC,CPTII,S$GLB, | Performed by: FAMILY MEDICINE

## 2021-11-15 PROCEDURE — 99214 PR OFFICE/OUTPT VISIT, EST, LEVL IV, 30-39 MIN: ICD-10-PCS | Mod: HCNC,S$GLB,, | Performed by: FAMILY MEDICINE

## 2021-11-15 PROCEDURE — 99214 OFFICE O/P EST MOD 30 MIN: CPT | Mod: HCNC,S$GLB,, | Performed by: FAMILY MEDICINE

## 2021-11-15 PROCEDURE — 1101F PR PT FALLS ASSESS DOC 0-1 FALLS W/OUT INJ PAST YR: ICD-10-PCS | Mod: HCNC,CPTII,S$GLB, | Performed by: FAMILY MEDICINE

## 2021-11-15 PROCEDURE — 99999 PR PBB SHADOW E&M-EST. PATIENT-LVL III: ICD-10-PCS | Mod: PBBFAC,HCNC,, | Performed by: FAMILY MEDICINE

## 2021-11-15 PROCEDURE — 90694 FLU VACCINE - QUADRIVALENT - ADJUVANTED: ICD-10-PCS | Mod: HCNC,S$GLB,, | Performed by: FAMILY MEDICINE

## 2021-11-15 PROCEDURE — 1126F AMNT PAIN NOTED NONE PRSNT: CPT | Mod: HCNC,CPTII,S$GLB, | Performed by: FAMILY MEDICINE

## 2021-11-15 PROCEDURE — G0008 FLU VACCINE - QUADRIVALENT - ADJUVANTED: ICD-10-PCS | Mod: HCNC,S$GLB,, | Performed by: FAMILY MEDICINE

## 2021-11-15 RX ORDER — LEVOTHYROXINE SODIUM 75 UG/1
TABLET ORAL
Qty: 90 TABLET | Refills: 3 | Status: SHIPPED | OUTPATIENT
Start: 2021-11-15 | End: 2023-03-06

## 2021-11-15 RX ORDER — ROSUVASTATIN CALCIUM 10 MG/1
10 TABLET, COATED ORAL NIGHTLY
Qty: 90 TABLET | Refills: 3 | Status: SHIPPED | OUTPATIENT
Start: 2021-11-15 | End: 2022-07-19

## 2021-11-16 ENCOUNTER — OFFICE VISIT (OUTPATIENT)
Dept: PULMONOLOGY | Facility: CLINIC | Age: 76
End: 2021-11-16
Payer: MEDICARE

## 2021-11-16 ENCOUNTER — HOSPITAL ENCOUNTER (OUTPATIENT)
Dept: PULMONOLOGY | Facility: CLINIC | Age: 76
Discharge: HOME | End: 2021-11-16
Payer: MEDICARE

## 2021-11-16 ENCOUNTER — PATIENT MESSAGE (OUTPATIENT)
Dept: INTERNAL MEDICINE | Facility: CLINIC | Age: 76
End: 2021-11-16
Payer: MEDICARE

## 2021-11-16 VITALS
HEIGHT: 64 IN | DIASTOLIC BLOOD PRESSURE: 72 MMHG | HEART RATE: 56 BPM | OXYGEN SATURATION: 96 % | BODY MASS INDEX: 25.44 KG/M2 | SYSTOLIC BLOOD PRESSURE: 126 MMHG | WEIGHT: 149 LBS

## 2021-11-16 DIAGNOSIS — R93.89 ABNORMAL CHEST X-RAY: Primary | ICD-10-CM

## 2021-11-16 DIAGNOSIS — J98.4 LUNG DISORDER: ICD-10-CM

## 2021-11-16 DIAGNOSIS — R06.9 ABNORMAL BREATHING: ICD-10-CM

## 2021-11-16 DIAGNOSIS — J90 PLEURAL EFFUSION, BILATERAL: ICD-10-CM

## 2021-11-16 DIAGNOSIS — J22 LOWER RESPIRATORY TRACT INFECTION: ICD-10-CM

## 2021-11-16 PROCEDURE — 99204 PR OFFICE/OUTPT VISIT, NEW, LEVL IV, 45-59 MIN: ICD-10-PCS | Mod: HCNC,S$GLB,, | Performed by: INTERNAL MEDICINE

## 2021-11-16 PROCEDURE — 94729 PR C02/MEMBANE DIFFUSE CAPACITY: ICD-10-PCS | Mod: HCNC,S$GLB,, | Performed by: INTERNAL MEDICINE

## 2021-11-16 PROCEDURE — 1126F PR PAIN SEVERITY QUANTIFIED, NO PAIN PRESENT: ICD-10-PCS | Mod: HCNC,CPTII,S$GLB, | Performed by: INTERNAL MEDICINE

## 2021-11-16 PROCEDURE — 1159F MED LIST DOCD IN RCRD: CPT | Mod: HCNC,CPTII,S$GLB, | Performed by: INTERNAL MEDICINE

## 2021-11-16 PROCEDURE — 3078F DIAST BP <80 MM HG: CPT | Mod: HCNC,CPTII,S$GLB, | Performed by: INTERNAL MEDICINE

## 2021-11-16 PROCEDURE — 94060 EVALUATION OF WHEEZING: CPT | Mod: HCNC,S$GLB,, | Performed by: INTERNAL MEDICINE

## 2021-11-16 PROCEDURE — 3074F SYST BP LT 130 MM HG: CPT | Mod: HCNC,CPTII,S$GLB, | Performed by: INTERNAL MEDICINE

## 2021-11-16 PROCEDURE — 3288F FALL RISK ASSESSMENT DOCD: CPT | Mod: HCNC,CPTII,S$GLB, | Performed by: INTERNAL MEDICINE

## 2021-11-16 PROCEDURE — 3078F PR MOST RECENT DIASTOLIC BLOOD PRESSURE < 80 MM HG: ICD-10-PCS | Mod: HCNC,CPTII,S$GLB, | Performed by: INTERNAL MEDICINE

## 2021-11-16 PROCEDURE — 1101F PT FALLS ASSESS-DOCD LE1/YR: CPT | Mod: HCNC,CPTII,S$GLB, | Performed by: INTERNAL MEDICINE

## 2021-11-16 PROCEDURE — 99999 PR PBB SHADOW E&M-EST. PATIENT-LVL IV: CPT | Mod: PBBFAC,HCNC,, | Performed by: INTERNAL MEDICINE

## 2021-11-16 PROCEDURE — 1126F AMNT PAIN NOTED NONE PRSNT: CPT | Mod: HCNC,CPTII,S$GLB, | Performed by: INTERNAL MEDICINE

## 2021-11-16 PROCEDURE — 1159F PR MEDICATION LIST DOCUMENTED IN MEDICAL RECORD: ICD-10-PCS | Mod: HCNC,CPTII,S$GLB, | Performed by: INTERNAL MEDICINE

## 2021-11-16 PROCEDURE — 99999 PR PBB SHADOW E&M-EST. PATIENT-LVL IV: ICD-10-PCS | Mod: PBBFAC,HCNC,, | Performed by: INTERNAL MEDICINE

## 2021-11-16 PROCEDURE — 3288F PR FALLS RISK ASSESSMENT DOCUMENTED: ICD-10-PCS | Mod: HCNC,CPTII,S$GLB, | Performed by: INTERNAL MEDICINE

## 2021-11-16 PROCEDURE — 94727 GAS DIL/WSHOT DETER LNG VOL: CPT | Mod: HCNC,S$GLB,, | Performed by: INTERNAL MEDICINE

## 2021-11-16 PROCEDURE — 94060 PR EVAL OF BRONCHOSPASM: ICD-10-PCS | Mod: HCNC,S$GLB,, | Performed by: INTERNAL MEDICINE

## 2021-11-16 PROCEDURE — 99204 OFFICE O/P NEW MOD 45 MIN: CPT | Mod: HCNC,S$GLB,, | Performed by: INTERNAL MEDICINE

## 2021-11-16 PROCEDURE — 1101F PR PT FALLS ASSESS DOC 0-1 FALLS W/OUT INJ PAST YR: ICD-10-PCS | Mod: HCNC,CPTII,S$GLB, | Performed by: INTERNAL MEDICINE

## 2021-11-16 PROCEDURE — 3074F PR MOST RECENT SYSTOLIC BLOOD PRESSURE < 130 MM HG: ICD-10-PCS | Mod: HCNC,CPTII,S$GLB, | Performed by: INTERNAL MEDICINE

## 2021-11-16 PROCEDURE — 94727 PR PULM FUNCTION TEST BY GAS: ICD-10-PCS | Mod: HCNC,S$GLB,, | Performed by: INTERNAL MEDICINE

## 2021-11-16 PROCEDURE — 94729 DIFFUSING CAPACITY: CPT | Mod: HCNC,S$GLB,, | Performed by: INTERNAL MEDICINE

## 2021-11-29 ENCOUNTER — HOSPITAL ENCOUNTER (OUTPATIENT)
Dept: RADIOLOGY | Facility: HOSPITAL | Age: 76
Discharge: HOME | End: 2021-11-29
Attending: FAMILY MEDICINE
Payer: MEDICARE

## 2021-11-29 DIAGNOSIS — J98.4 LUNG DISORDER: ICD-10-CM

## 2021-11-29 DIAGNOSIS — J90 PLEURAL EFFUSION, BILATERAL: ICD-10-CM

## 2021-11-29 PROCEDURE — 71046 X-RAY EXAM CHEST 2 VIEWS: CPT | Mod: 26,HCNC,, | Performed by: RADIOLOGY

## 2021-11-29 PROCEDURE — 71046 X-RAY EXAM CHEST 2 VIEWS: CPT | Mod: TC,HCNC,PO

## 2021-11-29 PROCEDURE — 71046 XR CHEST PA AND LATERAL: ICD-10-PCS | Mod: 26,HCNC,, | Performed by: RADIOLOGY

## 2021-11-30 ENCOUNTER — PATIENT MESSAGE (OUTPATIENT)
Dept: INTERNAL MEDICINE | Facility: CLINIC | Age: 76
End: 2021-11-30
Payer: MEDICARE

## 2021-12-13 PROBLEM — N39.46 MIXED INCONTINENCE: Status: RESOLVED | Noted: 2020-06-26 | Resolved: 2021-12-13

## 2021-12-14 ENCOUNTER — OFFICE VISIT (OUTPATIENT)
Dept: INTERNAL MEDICINE | Facility: CLINIC | Age: 76
End: 2021-12-14
Payer: MEDICARE

## 2021-12-14 VITALS
OXYGEN SATURATION: 97 % | RESPIRATION RATE: 14 BRPM | WEIGHT: 143 LBS | DIASTOLIC BLOOD PRESSURE: 60 MMHG | BODY MASS INDEX: 23.82 KG/M2 | HEIGHT: 65 IN | SYSTOLIC BLOOD PRESSURE: 100 MMHG | HEART RATE: 64 BPM

## 2021-12-14 DIAGNOSIS — Z11.59 NEED FOR HEPATITIS C SCREENING TEST: ICD-10-CM

## 2021-12-14 DIAGNOSIS — E03.9 HYPOTHYROIDISM, UNSPECIFIED TYPE: ICD-10-CM

## 2021-12-14 DIAGNOSIS — K57.90 DIVERTICULOSIS: ICD-10-CM

## 2021-12-14 DIAGNOSIS — Z86.010 HISTORY OF COLON POLYPS: ICD-10-CM

## 2021-12-14 DIAGNOSIS — Z85.828 HISTORY OF BASAL CELL CARCINOMA (BCC) OF SKIN: ICD-10-CM

## 2021-12-14 DIAGNOSIS — M85.80 OSTEOPENIA, UNSPECIFIED LOCATION: ICD-10-CM

## 2021-12-14 DIAGNOSIS — E78.5 HYPERLIPIDEMIA, UNSPECIFIED HYPERLIPIDEMIA TYPE: ICD-10-CM

## 2021-12-14 DIAGNOSIS — Z00.00 ENCOUNTER FOR PREVENTIVE HEALTH EXAMINATION: Primary | ICD-10-CM

## 2021-12-14 DIAGNOSIS — Z13.820 SCREENING FOR OSTEOPOROSIS: ICD-10-CM

## 2021-12-14 DIAGNOSIS — M85.89 OTHER SPECIFIED DISORDERS OF BONE DENSITY AND STRUCTURE, MULTIPLE SITES: ICD-10-CM

## 2021-12-14 DIAGNOSIS — N95.9 MENOPAUSAL DISORDER: ICD-10-CM

## 2021-12-14 DIAGNOSIS — H25.10 NUCLEAR SCLEROSIS, UNSPECIFIED LATERALITY: ICD-10-CM

## 2021-12-14 PROBLEM — R53.1 DECREASED STRENGTH: Status: RESOLVED | Noted: 2021-05-03 | Resolved: 2021-12-14

## 2021-12-14 PROBLEM — N39.46 MIXED INCONTINENCE URGE AND STRESS: Status: RESOLVED | Noted: 2020-08-21 | Resolved: 2021-12-14

## 2021-12-14 PROCEDURE — 99999 PR PBB SHADOW E&M-EST. PATIENT-LVL V: ICD-10-PCS | Mod: PBBFAC,HCNC,, | Performed by: NURSE PRACTITIONER

## 2021-12-14 PROCEDURE — G0439 PPPS, SUBSEQ VISIT: HCPCS | Mod: HCNC,S$GLB,, | Performed by: NURSE PRACTITIONER

## 2021-12-14 PROCEDURE — G0439 PR MEDICARE ANNUAL WELLNESS SUBSEQUENT VISIT: ICD-10-PCS | Mod: HCNC,S$GLB,, | Performed by: NURSE PRACTITIONER

## 2021-12-14 PROCEDURE — 99999 PR PBB SHADOW E&M-EST. PATIENT-LVL V: CPT | Mod: PBBFAC,HCNC,, | Performed by: NURSE PRACTITIONER

## 2021-12-27 ENCOUNTER — LAB VISIT (OUTPATIENT)
Dept: PRIMARY CARE CLINIC | Facility: OTHER | Age: 76
End: 2021-12-27
Payer: MEDICARE

## 2021-12-27 DIAGNOSIS — R05.9 COUGH: ICD-10-CM

## 2021-12-27 PROCEDURE — U0003 INFECTIOUS AGENT DETECTION BY NUCLEIC ACID (DNA OR RNA); SEVERE ACUTE RESPIRATORY SYNDROME CORONAVIRUS 2 (SARS-COV-2) (CORONAVIRUS DISEASE [COVID-19]), AMPLIFIED PROBE TECHNIQUE, MAKING USE OF HIGH THROUGHPUT TECHNOLOGIES AS DESCRIBED BY CMS-2020-01-R: HCPCS | Mod: HCNC | Performed by: INTERNAL MEDICINE

## 2021-12-28 LAB
SARS-COV-2 RNA RESP QL NAA+PROBE: DETECTED
SARS-COV-2- CYCLE NUMBER: 38

## 2021-12-29 ENCOUNTER — TELEPHONE (OUTPATIENT)
Dept: PRIMARY CARE CLINIC | Facility: CLINIC | Age: 76
End: 2021-12-29

## 2021-12-29 ENCOUNTER — TELEPHONE (OUTPATIENT)
Dept: PRIMARY CARE CLINIC | Facility: CLINIC | Age: 76
End: 2021-12-29
Payer: MEDICARE

## 2021-12-29 ENCOUNTER — OFFICE VISIT (OUTPATIENT)
Dept: PRIMARY CARE CLINIC | Facility: CLINIC | Age: 76
End: 2021-12-29
Payer: MEDICARE

## 2021-12-29 VITALS — HEIGHT: 64 IN | BODY MASS INDEX: 24.55 KG/M2 | TEMPERATURE: 98 F

## 2021-12-29 DIAGNOSIS — U07.1 COVID-19 VIRUS INFECTION: Primary | ICD-10-CM

## 2021-12-29 DIAGNOSIS — U07.1 COVID-19: Primary | ICD-10-CM

## 2021-12-29 DIAGNOSIS — R05.9 COUGH: ICD-10-CM

## 2021-12-29 PROCEDURE — 3044F PR MOST RECENT HEMOGLOBIN A1C LEVEL <7.0%: ICD-10-PCS | Mod: HCNC,CPTII,95, | Performed by: FAMILY MEDICINE

## 2021-12-29 PROCEDURE — 1159F MED LIST DOCD IN RCRD: CPT | Mod: HCNC,CPTII,95, | Performed by: FAMILY MEDICINE

## 2021-12-29 PROCEDURE — 3044F HG A1C LEVEL LT 7.0%: CPT | Mod: HCNC,CPTII,95, | Performed by: FAMILY MEDICINE

## 2021-12-29 PROCEDURE — 99213 PR OFFICE/OUTPT VISIT, EST, LEVL III, 20-29 MIN: ICD-10-PCS | Mod: HCNC,95,, | Performed by: FAMILY MEDICINE

## 2021-12-29 PROCEDURE — 3288F PR FALLS RISK ASSESSMENT DOCUMENTED: ICD-10-PCS | Mod: HCNC,CPTII,95, | Performed by: FAMILY MEDICINE

## 2021-12-29 PROCEDURE — 1160F PR REVIEW ALL MEDS BY PRESCRIBER/CLIN PHARMACIST DOCUMENTED: ICD-10-PCS | Mod: HCNC,CPTII,95, | Performed by: FAMILY MEDICINE

## 2021-12-29 PROCEDURE — 1101F PT FALLS ASSESS-DOCD LE1/YR: CPT | Mod: HCNC,CPTII,95, | Performed by: FAMILY MEDICINE

## 2021-12-29 PROCEDURE — 1160F RVW MEDS BY RX/DR IN RCRD: CPT | Mod: HCNC,CPTII,95, | Performed by: FAMILY MEDICINE

## 2021-12-29 PROCEDURE — 1101F PR PT FALLS ASSESS DOC 0-1 FALLS W/OUT INJ PAST YR: ICD-10-PCS | Mod: HCNC,CPTII,95, | Performed by: FAMILY MEDICINE

## 2021-12-29 PROCEDURE — 99213 OFFICE O/P EST LOW 20 MIN: CPT | Mod: HCNC,95,, | Performed by: FAMILY MEDICINE

## 2021-12-29 PROCEDURE — 3288F FALL RISK ASSESSMENT DOCD: CPT | Mod: HCNC,CPTII,95, | Performed by: FAMILY MEDICINE

## 2021-12-29 PROCEDURE — 1159F PR MEDICATION LIST DOCUMENTED IN MEDICAL RECORD: ICD-10-PCS | Mod: HCNC,CPTII,95, | Performed by: FAMILY MEDICINE

## 2021-12-29 RX ORDER — ALBUTEROL SULFATE 0.83 MG/ML
2.5 SOLUTION RESPIRATORY (INHALATION) 3 TIMES DAILY PRN
Qty: 25 EACH | Refills: 0 | Status: SHIPPED | OUTPATIENT
Start: 2021-12-29 | End: 2022-12-29

## 2022-02-07 ENCOUNTER — TELEPHONE (OUTPATIENT)
Dept: PRIMARY CARE CLINIC | Facility: CLINIC | Age: 77
End: 2022-02-07
Payer: MEDICARE

## 2022-02-07 DIAGNOSIS — E03.9 HYPOTHYROIDISM, UNSPECIFIED TYPE: Primary | ICD-10-CM

## 2022-02-07 DIAGNOSIS — E78.5 HYPERLIPIDEMIA, UNSPECIFIED HYPERLIPIDEMIA TYPE: ICD-10-CM

## 2022-02-07 NOTE — TELEPHONE ENCOUNTER
----- Message from Tanesha Ching sent at 2/7/2022  2:23 PM CST -----  Type:  Sooner Apoointment Request    Caller is requesting a sooner appointment.  Caller declined first available appointment listed below.  Caller will not accept being placed on the waitlist and is requesting a message be sent to doctor.  Name of Caller:pt  When is the first available appointment?July  Symptoms:f/u   Would the patient rather a call back or a response via [facility]? Call   Best Call Back Number:[phone]  Additional Information: #    Thank you

## 2022-02-14 ENCOUNTER — APPOINTMENT (OUTPATIENT)
Dept: RADIOLOGY | Facility: CLINIC | Age: 77
End: 2022-02-14
Attending: NURSE PRACTITIONER
Payer: MEDICARE

## 2022-02-14 DIAGNOSIS — M85.80 OSTEOPENIA, UNSPECIFIED LOCATION: ICD-10-CM

## 2022-02-14 DIAGNOSIS — Z13.820 SCREENING FOR OSTEOPOROSIS: ICD-10-CM

## 2022-02-14 DIAGNOSIS — M85.89 OTHER SPECIFIED DISORDERS OF BONE DENSITY AND STRUCTURE, MULTIPLE SITES: ICD-10-CM

## 2022-02-14 PROCEDURE — 77080 DXA BONE DENSITY AXIAL: CPT | Mod: 26,HCNC,, | Performed by: INTERNAL MEDICINE

## 2022-02-14 PROCEDURE — 77080 DXA BONE DENSITY AXIAL: CPT | Mod: TC,HCNC,PO

## 2022-02-14 PROCEDURE — 77080 DEXA BONE DENSITY SPINE HIP: ICD-10-PCS | Mod: 26,HCNC,, | Performed by: INTERNAL MEDICINE

## 2022-02-28 ENCOUNTER — PATIENT MESSAGE (OUTPATIENT)
Dept: PRIMARY CARE CLINIC | Facility: CLINIC | Age: 77
End: 2022-02-28
Payer: MEDICARE

## 2022-03-28 NOTE — TELEPHONE ENCOUNTER
Called patient, left voicemail. CT scan consistent with cellulitis, no abscess or fluid collection noted. Recommend continuing antibiotics and office follow up in 2 weeks    Mucosal Advancement Flap Text: Given the location of the defect, shape of the defect and the proximity to free margins a mucosal advancement flap was deemed most appropriate. Incisions were made with a 15 blade scalpel in the appropriate fashion along the cutaneous vermilion border and the mucosal lip. The remaining actinically damaged mucosal tissue was excised.  The mucosal advancement flap was then elevated to the gingival sulcus with care taken to preserve the neurovascular structures and advanced into the primary defect. Care was taken to ensure that precise realignment of the vermilion border was achieved.

## 2022-04-13 ENCOUNTER — TELEPHONE (OUTPATIENT)
Dept: PRIMARY CARE CLINIC | Facility: CLINIC | Age: 77
End: 2022-04-13
Payer: MEDICARE

## 2022-04-13 NOTE — TELEPHONE ENCOUNTER
Please advise re: pre op for eye surgery on 4-28-22. No appt available for pre op  Pt has annual in November 2021 OV  Pt has covid OV in December    Forms on desk    Please advise

## 2022-04-18 NOTE — TELEPHONE ENCOUNTER
Please help her set up a preop visit as soon as possible, if needed we can always get her in with Cardiology if there is no other availability

## 2022-04-20 NOTE — TELEPHONE ENCOUNTER
Spoke with pt re: pre op form for eye surgery. Dr. Quispe does not have any openings until may.   Gave pt centralized scheduling # to call & they can assist her in making an appt

## 2022-04-25 ENCOUNTER — OFFICE VISIT (OUTPATIENT)
Dept: PRIMARY CARE CLINIC | Facility: CLINIC | Age: 77
End: 2022-04-25
Payer: MEDICARE

## 2022-04-25 VITALS
HEIGHT: 64 IN | HEART RATE: 56 BPM | DIASTOLIC BLOOD PRESSURE: 72 MMHG | OXYGEN SATURATION: 95 % | WEIGHT: 151.69 LBS | BODY MASS INDEX: 25.9 KG/M2 | SYSTOLIC BLOOD PRESSURE: 118 MMHG | TEMPERATURE: 98 F

## 2022-04-25 DIAGNOSIS — H26.9 CATARACT OF LEFT EYE, UNSPECIFIED CATARACT TYPE: Primary | ICD-10-CM

## 2022-04-25 PROCEDURE — 1126F AMNT PAIN NOTED NONE PRSNT: CPT | Mod: CPTII,S$GLB,, | Performed by: INTERNAL MEDICINE

## 2022-04-25 PROCEDURE — 99999 PR PBB SHADOW E&M-EST. PATIENT-LVL III: CPT | Mod: PBBFAC,,, | Performed by: INTERNAL MEDICINE

## 2022-04-25 PROCEDURE — 3078F DIAST BP <80 MM HG: CPT | Mod: CPTII,S$GLB,, | Performed by: INTERNAL MEDICINE

## 2022-04-25 PROCEDURE — 3288F FALL RISK ASSESSMENT DOCD: CPT | Mod: CPTII,S$GLB,, | Performed by: INTERNAL MEDICINE

## 2022-04-25 PROCEDURE — 99213 PR OFFICE/OUTPT VISIT, EST, LEVL III, 20-29 MIN: ICD-10-PCS | Mod: S$GLB,,, | Performed by: INTERNAL MEDICINE

## 2022-04-25 PROCEDURE — 3074F PR MOST RECENT SYSTOLIC BLOOD PRESSURE < 130 MM HG: ICD-10-PCS | Mod: CPTII,S$GLB,, | Performed by: INTERNAL MEDICINE

## 2022-04-25 PROCEDURE — 3078F PR MOST RECENT DIASTOLIC BLOOD PRESSURE < 80 MM HG: ICD-10-PCS | Mod: CPTII,S$GLB,, | Performed by: INTERNAL MEDICINE

## 2022-04-25 PROCEDURE — 1126F PR PAIN SEVERITY QUANTIFIED, NO PAIN PRESENT: ICD-10-PCS | Mod: CPTII,S$GLB,, | Performed by: INTERNAL MEDICINE

## 2022-04-25 PROCEDURE — 1101F PR PT FALLS ASSESS DOC 0-1 FALLS W/OUT INJ PAST YR: ICD-10-PCS | Mod: CPTII,S$GLB,, | Performed by: INTERNAL MEDICINE

## 2022-04-25 PROCEDURE — 99999 PR PBB SHADOW E&M-EST. PATIENT-LVL III: ICD-10-PCS | Mod: PBBFAC,,, | Performed by: INTERNAL MEDICINE

## 2022-04-25 PROCEDURE — 3288F PR FALLS RISK ASSESSMENT DOCUMENTED: ICD-10-PCS | Mod: CPTII,S$GLB,, | Performed by: INTERNAL MEDICINE

## 2022-04-25 PROCEDURE — 3074F SYST BP LT 130 MM HG: CPT | Mod: CPTII,S$GLB,, | Performed by: INTERNAL MEDICINE

## 2022-04-25 PROCEDURE — 99213 OFFICE O/P EST LOW 20 MIN: CPT | Mod: S$GLB,,, | Performed by: INTERNAL MEDICINE

## 2022-04-25 PROCEDURE — 1101F PT FALLS ASSESS-DOCD LE1/YR: CPT | Mod: CPTII,S$GLB,, | Performed by: INTERNAL MEDICINE

## 2022-04-25 NOTE — PROGRESS NOTES
[facility] Progress Note  4/25/2022          Reason for Visit:      had concerns including Pre-op Exam.       History of Present Illness:     Pt is here today for a preop visit/ exam prior to planned cataract surgery on left eye.  No new complaints today.  Dr. Quispe is PCP  She has hypothyroidism that has been well controlled on synthroid  Reviewed labs from November      Problem List:     Patient Active Problem List   Diagnosis    Nuclear sclerosis    Hypothyroidism    Menopausal disorder    Osteopenia    Hyperlipidemia    History of colon polyps    Diverticulosis    History of basal cell carcinoma (BCC) of skin         Surgical History:     She has a past surgical history that includes Wrist surgery; gutierrez neuroma; Bilateral lower extremity varicose vein treatment ; Colonoscopy (N/A, 2/23/2018); Mini-laparotomy; Dilation and curettage of uterus; Tonsillectomy; Oophorectomy (04/09/2018); Hip replacement arthroplasty (Left); Creation of urethral sling using polypropylene tape by retropubic approach (N/A, 8/21/2020); Hysterectomy (04/09/2018); and Joint replacement.      Family History:      Her family history includes Cancer in her brother and father; Cataracts in her father and mother; Heart disease in her mother; Hypothyroidism in her daughter, mother, and sister; Macular degeneration in her father.      Social History:     She reports that she quit smoking about 45 years ago. She has a 2.50 pack-year smoking history. She has quit using smokeless tobacco. She reports current alcohol use. She reports that she does not use drugs.      Medications:       Current Outpatient Medications:     albuterol (PROVENTIL) 2.5 mg /3 mL (0.083 %) nebulizer solution, Take 3 mLs (2.5 mg total) by nebulization 3 (three) times daily as needed for Wheezing. Rescue, Disp: 25 each, Rfl: 0    albuterol (VENTOLIN HFA) 90 mcg/actuation inhaler, Inhale 2 puffs into the lungs every 6 (six) hours as needed  for Wheezing or Shortness of Breath. Rescue (Patient not taking: Reported on 12/14/2021), Disp: 8 g, Rfl: 3    aspirin (ECOTRIN) 81 MG EC tablet, Take 81 mg by mouth once daily., Disp: , Rfl:     calcium carbonate (OS-SUHAIL) 600 mg calcium (1,500 mg) Tab, Take 1,200 mg by mouth once daily., Disp: , Rfl:     cholecalciferol, vitamin D3, (VITAMIN D3) 25 mcg (1,000 unit) capsule, Take 1,000 Units by mouth once daily., Disp: , Rfl:     estradioL (ESTRACE) 0.5 MG tablet, Take 1 tablet (0.5 mg total) by mouth once daily., Disp: 90 tablet, Rfl: 3    fexofenadine (ALLEGRA) 60 MG tablet, Take 60 mg by mouth as needed., Disp: , Rfl:     fluticasone propionate (FLONASE) 50 mcg/actuation nasal spray, 1 spray (50 mcg total) by Each Nostril route once daily. (Patient taking differently: 1 spray by Each Nostril route as needed.), Disp: 9.9 mL, Rfl: 0    furosemide (LASIX) 40 MG tablet, Take 1 tablet (40 mg total) by mouth daily as needed (swelling)., Disp: 90 tablet, Rfl: 2    ibuprofen (ADVIL,MOTRIN) 600 MG tablet, Take 1 tablet (600 mg total) by mouth every 6 (six) hours as needed for Pain., Disp: 30 tablet, Rfl: 1    loratadine (CLARITIN) 10 mg tablet, Take 10 mg by mouth daily as needed., Disp: , Rfl:     meloxicam (MOBIC) 7.5 MG tablet, TAKE 1 TO 2 TABLETS BY MOUTH EVERY DAY AS NEEDED FOR BACK PAIN (Patient taking differently: TAKE 1 TO 2 TABLETS BY MOUTH EVERY DAY AS NEEDED FOR BACK PAIN Has not used yet), Disp: 30 tablet, Rfl: 0    methocarbamoL (ROBAXIN) 500 MG Tab, 1-2 tablets nightly for back spasm (Patient taking differently: 1-2 tablets nightly for back spasm Has not used yet), Disp: 30 tablet, Rfl: 0    mirabegron (MYRBETRIQ) 25 mg Tb24 ER tablet, Take 1 tablet (25 mg total) by mouth once daily., Disp: 90 tablet, Rfl: 3    multivit,iron,minerals/lutein (CENTRUM SILVER ULTRA WOMEN'S ORAL), Take 1 tablet by mouth once daily., Disp: , Rfl:     rosuvastatin (CRESTOR) 10 MG tablet, Take 1 tablet (10 mg total)  "by mouth every evening., Disp: 90 tablet, Rfl: 3    SYNTHROID 75 mcg tablet, TAKE 1 TABLET BY MOUTH DAILY, Disp: 90 tablet, Rfl: 3    vitC/E/Zn/copper/lutein/zeaxan (ICAPS AREDS2 ORAL), Take 2 capsules by mouth once daily., Disp: , Rfl:         Allergies:   She has No Known Allergies.      Review of Systems:     Review of Systems   Constitutional: Negative for chills and fever.   HENT: Negative for ear pain and sore throat.    Respiratory: Negative for cough, shortness of breath and wheezing.    Cardiovascular: Negative for chest pain and palpitations.   Gastrointestinal: Negative for constipation, diarrhea, nausea and vomiting.   Genitourinary: Negative for dysuria and hematuria.   Musculoskeletal: Negative for joint swelling and joint deformity.   Neurological: Negative for dizziness and weakness.           Physical Exam:     Temp:    98 °F (36.7 °C) (Oral)        Blood Pressure:  118/72        Pulse:   (!) 56     Respirations:     Weight:   68.8 kg (151 lb 10.8 oz)  Height:   5' 4" (1.626 m)  BMI:     Body mass index is 26.04 kg/m².    Physical Exam  Constitutional:       General: She is not in acute distress.  HENT:      Right Ear: Tympanic membrane normal.      Left Ear: Tympanic membrane normal.      Nose: No congestion or rhinorrhea.      Mouth/Throat:      Pharynx: No oropharyngeal exudate or posterior oropharyngeal erythema.   Cardiovascular:      Rate and Rhythm: Normal rate and regular rhythm.   Pulmonary:      Effort: Pulmonary effort is normal. No respiratory distress.      Breath sounds: No wheezing.   Abdominal:      Palpations: Abdomen is soft.      Tenderness: There is no abdominal tenderness.   Skin:     General: Skin is warm.   Neurological:      General: No focal deficit present.      Mental Status: She is alert and oriented to person, place, and time.         Labs/Imaging/Testing:     Most recent CBC:  Lab Results   Component Value Date    WBC 5.93 11/29/2021    HGB 14.3 11/29/2021     " 11/29/2021    MCV 99 (H) 11/29/2021       Most recent Lipids:  Lab Results   Component Value Date    CHOL 152 11/29/2021    HDL 61 11/29/2021    LDLCALC 80.6 11/29/2021    TRIG 52 11/29/2021       Most recent Chemistry:  Lab Results   Component Value Date     11/29/2021    K 4.5 11/29/2021     11/29/2021    CO2 26 11/29/2021    BUN 16 11/29/2021    CREATININE 0.7 11/29/2021    GLU 87 11/29/2021    CALCIUM 8.6 (L) 11/29/2021    ALT 11 11/29/2021    AST 15 11/29/2021    ALKPHOS 31 (L) 11/29/2021    PROT 5.2 (L) 11/29/2021    ALBUMIN 3.1 (L) 11/29/2021       Other tests:  Lab Results   Component Value Date    TSH 1.273 11/29/2021    FREET4 1.01 08/05/2020    HGBA1C 5.1 11/29/2021    [id number] 37 04/15/2021               Assessment and Plan:     1. Cataract of left eye, unspecified cataract type  Completed preop form for cataract surgery tdoay  Pt has appt in July scheduled for her routine follow up with Dr. Jose Enrique Wing MD  4/25/2022    This note was prepared using voice-recognition software.  Although efforts are made to proofread the note, some errors may persist in the final document.

## 2022-07-12 ENCOUNTER — TELEPHONE (OUTPATIENT)
Dept: PRIMARY CARE CLINIC | Facility: CLINIC | Age: 77
End: 2022-07-12
Payer: MEDICARE

## 2022-07-12 ENCOUNTER — PATIENT MESSAGE (OUTPATIENT)
Dept: PRIMARY CARE CLINIC | Facility: CLINIC | Age: 77
End: 2022-07-12
Payer: MEDICARE

## 2022-07-15 ENCOUNTER — LAB VISIT (OUTPATIENT)
Dept: LAB | Facility: HOSPITAL | Age: 77
End: 2022-07-15
Attending: NURSE PRACTITIONER
Payer: MEDICARE

## 2022-07-15 DIAGNOSIS — E03.9 HYPOTHYROIDISM, UNSPECIFIED TYPE: ICD-10-CM

## 2022-07-15 DIAGNOSIS — Z11.59 NEED FOR HEPATITIS C SCREENING TEST: ICD-10-CM

## 2022-07-15 DIAGNOSIS — E78.5 HYPERLIPIDEMIA, UNSPECIFIED HYPERLIPIDEMIA TYPE: ICD-10-CM

## 2022-07-15 LAB
ALBUMIN SERPL BCP-MCNC: 3.2 G/DL (ref 3.5–5.2)
ALP SERPL-CCNC: 34 U/L (ref 55–135)
ALT SERPL W/O P-5'-P-CCNC: 9 U/L (ref 10–44)
ANION GAP SERPL CALC-SCNC: 6 MMOL/L (ref 8–16)
AST SERPL-CCNC: 16 U/L (ref 10–40)
BASOPHILS # BLD AUTO: 0.07 K/UL (ref 0–0.2)
BASOPHILS NFR BLD: 1.3 % (ref 0–1.9)
BILIRUB SERPL-MCNC: 0.6 MG/DL (ref 0.1–1)
BUN SERPL-MCNC: 17 MG/DL (ref 8–23)
CALCIUM SERPL-MCNC: 8.6 MG/DL (ref 8.7–10.5)
CHLORIDE SERPL-SCNC: 105 MMOL/L (ref 95–110)
CO2 SERPL-SCNC: 29 MMOL/L (ref 23–29)
CREAT SERPL-MCNC: 0.7 MG/DL (ref 0.5–1.4)
DIFFERENTIAL METHOD: ABNORMAL
EOSINOPHIL # BLD AUTO: 0.2 K/UL (ref 0–0.5)
EOSINOPHIL NFR BLD: 2.9 % (ref 0–8)
ERYTHROCYTE [DISTWIDTH] IN BLOOD BY AUTOMATED COUNT: 12.8 % (ref 11.5–14.5)
EST. GFR  (AFRICAN AMERICAN): >60 ML/MIN/1.73 M^2
EST. GFR  (NON AFRICAN AMERICAN): >60 ML/MIN/1.73 M^2
GLUCOSE SERPL-MCNC: 88 MG/DL (ref 70–110)
HCT VFR BLD AUTO: 46 % (ref 37–48.5)
HGB BLD-MCNC: 14.9 G/DL (ref 12–16)
IMM GRANULOCYTES # BLD AUTO: 0.01 K/UL (ref 0–0.04)
IMM GRANULOCYTES NFR BLD AUTO: 0.2 % (ref 0–0.5)
LYMPHOCYTES # BLD AUTO: 2.2 K/UL (ref 1–4.8)
LYMPHOCYTES NFR BLD: 39.1 % (ref 18–48)
MCH RBC QN AUTO: 31.8 PG (ref 27–31)
MCHC RBC AUTO-ENTMCNC: 32.4 G/DL (ref 32–36)
MCV RBC AUTO: 98 FL (ref 82–98)
MONOCYTES # BLD AUTO: 0.4 K/UL (ref 0.3–1)
MONOCYTES NFR BLD: 7.7 % (ref 4–15)
NEUTROPHILS # BLD AUTO: 2.7 K/UL (ref 1.8–7.7)
NEUTROPHILS NFR BLD: 48.8 % (ref 38–73)
NRBC BLD-RTO: 0 /100 WBC
PLATELET # BLD AUTO: 231 K/UL (ref 150–450)
PMV BLD AUTO: 12 FL (ref 9.2–12.9)
POTASSIUM SERPL-SCNC: 4.6 MMOL/L (ref 3.5–5.1)
PROT SERPL-MCNC: 5.8 G/DL (ref 6–8.4)
RBC # BLD AUTO: 4.69 M/UL (ref 4–5.4)
SODIUM SERPL-SCNC: 140 MMOL/L (ref 136–145)
WBC # BLD AUTO: 5.58 K/UL (ref 3.9–12.7)

## 2022-07-15 PROCEDURE — 80053 COMPREHEN METABOLIC PANEL: CPT | Performed by: FAMILY MEDICINE

## 2022-07-15 PROCEDURE — 86803 HEPATITIS C AB TEST: CPT | Performed by: NURSE PRACTITIONER

## 2022-07-15 PROCEDURE — 36415 COLL VENOUS BLD VENIPUNCTURE: CPT | Mod: PO | Performed by: FAMILY MEDICINE

## 2022-07-15 PROCEDURE — 85025 COMPLETE CBC W/AUTO DIFF WBC: CPT | Performed by: FAMILY MEDICINE

## 2022-07-18 LAB — HCV AB SERPL QL IA: NEGATIVE

## 2022-07-25 ENCOUNTER — PATIENT MESSAGE (OUTPATIENT)
Dept: PRIMARY CARE CLINIC | Facility: CLINIC | Age: 77
End: 2022-07-25
Payer: MEDICARE

## 2022-08-01 ENCOUNTER — PATIENT OUTREACH (OUTPATIENT)
Dept: ADMINISTRATIVE | Facility: HOSPITAL | Age: 77
End: 2022-08-01
Payer: MEDICARE

## 2022-08-01 NOTE — PROGRESS NOTES
Patient not due/eligible([employer] report). Patient has history of colon polyps. Colonoscopy 2/23/2018, due 2/2023.

## 2022-08-12 ENCOUNTER — PATIENT OUTREACH (OUTPATIENT)
Dept: ADMINISTRATIVE | Facility: HOSPITAL | Age: 77
End: 2022-08-12
Payer: MEDICARE

## 2022-09-01 ENCOUNTER — OFFICE VISIT (OUTPATIENT)
Dept: PRIMARY CARE CLINIC | Facility: CLINIC | Age: 77
End: 2022-09-01
Payer: MEDICARE

## 2022-09-01 VITALS
RESPIRATION RATE: 18 BRPM | OXYGEN SATURATION: 96 % | SYSTOLIC BLOOD PRESSURE: 98 MMHG | WEIGHT: 151 LBS | DIASTOLIC BLOOD PRESSURE: 54 MMHG | BODY MASS INDEX: 25.78 KG/M2 | TEMPERATURE: 98 F | HEIGHT: 64 IN | HEART RATE: 51 BPM

## 2022-09-01 DIAGNOSIS — M81.0 OSTEOPOROSIS, UNSPECIFIED OSTEOPOROSIS TYPE, UNSPECIFIED PATHOLOGICAL FRACTURE PRESENCE: ICD-10-CM

## 2022-09-01 DIAGNOSIS — Z91.09 ALLERGY TO ENVIRONMENTAL FACTORS: ICD-10-CM

## 2022-09-01 DIAGNOSIS — Z85.828 HISTORY OF BASAL CELL CARCINOMA (BCC) OF SKIN: ICD-10-CM

## 2022-09-01 DIAGNOSIS — E03.9 HYPOTHYROIDISM, UNSPECIFIED TYPE: ICD-10-CM

## 2022-09-01 DIAGNOSIS — K57.90 DIVERTICULOSIS: ICD-10-CM

## 2022-09-01 DIAGNOSIS — Z86.16 HISTORY OF COVID-19: ICD-10-CM

## 2022-09-01 DIAGNOSIS — R42 EPISODIC LIGHTHEADEDNESS: ICD-10-CM

## 2022-09-01 DIAGNOSIS — E78.5 HYPERLIPIDEMIA, UNSPECIFIED HYPERLIPIDEMIA TYPE: Primary | ICD-10-CM

## 2022-09-01 PROCEDURE — 3078F PR MOST RECENT DIASTOLIC BLOOD PRESSURE < 80 MM HG: ICD-10-PCS | Mod: CPTII,S$GLB,, | Performed by: FAMILY MEDICINE

## 2022-09-01 PROCEDURE — 1159F MED LIST DOCD IN RCRD: CPT | Mod: CPTII,S$GLB,, | Performed by: FAMILY MEDICINE

## 2022-09-01 PROCEDURE — 3078F DIAST BP <80 MM HG: CPT | Mod: CPTII,S$GLB,, | Performed by: FAMILY MEDICINE

## 2022-09-01 PROCEDURE — 99999 PR PBB SHADOW E&M-EST. PATIENT-LVL V: CPT | Mod: PBBFAC,,, | Performed by: FAMILY MEDICINE

## 2022-09-01 PROCEDURE — 1125F AMNT PAIN NOTED PAIN PRSNT: CPT | Mod: CPTII,S$GLB,, | Performed by: FAMILY MEDICINE

## 2022-09-01 PROCEDURE — 93010 ELECTROCARDIOGRAM REPORT: CPT | Mod: S$GLB,,, | Performed by: INTERNAL MEDICINE

## 2022-09-01 PROCEDURE — 3288F FALL RISK ASSESSMENT DOCD: CPT | Mod: CPTII,S$GLB,, | Performed by: FAMILY MEDICINE

## 2022-09-01 PROCEDURE — 93010 EKG 12-LEAD: ICD-10-PCS | Mod: S$GLB,,, | Performed by: INTERNAL MEDICINE

## 2022-09-01 PROCEDURE — 1160F PR REVIEW ALL MEDS BY PRESCRIBER/CLIN PHARMACIST DOCUMENTED: ICD-10-PCS | Mod: CPTII,S$GLB,, | Performed by: FAMILY MEDICINE

## 2022-09-01 PROCEDURE — 1101F PR PT FALLS ASSESS DOC 0-1 FALLS W/OUT INJ PAST YR: ICD-10-PCS | Mod: CPTII,S$GLB,, | Performed by: FAMILY MEDICINE

## 2022-09-01 PROCEDURE — 99214 OFFICE O/P EST MOD 30 MIN: CPT | Mod: S$GLB,,, | Performed by: FAMILY MEDICINE

## 2022-09-01 PROCEDURE — 93005 ELECTROCARDIOGRAM TRACING: CPT | Mod: S$GLB,,, | Performed by: FAMILY MEDICINE

## 2022-09-01 PROCEDURE — 1159F PR MEDICATION LIST DOCUMENTED IN MEDICAL RECORD: ICD-10-PCS | Mod: CPTII,S$GLB,, | Performed by: FAMILY MEDICINE

## 2022-09-01 PROCEDURE — 93005 EKG 12-LEAD: ICD-10-PCS | Mod: S$GLB,,, | Performed by: FAMILY MEDICINE

## 2022-09-01 PROCEDURE — 1160F RVW MEDS BY RX/DR IN RCRD: CPT | Mod: CPTII,S$GLB,, | Performed by: FAMILY MEDICINE

## 2022-09-01 PROCEDURE — 3074F SYST BP LT 130 MM HG: CPT | Mod: CPTII,S$GLB,, | Performed by: FAMILY MEDICINE

## 2022-09-01 PROCEDURE — 1101F PT FALLS ASSESS-DOCD LE1/YR: CPT | Mod: CPTII,S$GLB,, | Performed by: FAMILY MEDICINE

## 2022-09-01 PROCEDURE — 3074F PR MOST RECENT SYSTOLIC BLOOD PRESSURE < 130 MM HG: ICD-10-PCS | Mod: CPTII,S$GLB,, | Performed by: FAMILY MEDICINE

## 2022-09-01 PROCEDURE — 99214 PR OFFICE/OUTPT VISIT, EST, LEVL IV, 30-39 MIN: ICD-10-PCS | Mod: S$GLB,,, | Performed by: FAMILY MEDICINE

## 2022-09-01 PROCEDURE — 1125F PR PAIN SEVERITY QUANTIFIED, PAIN PRESENT: ICD-10-PCS | Mod: CPTII,S$GLB,, | Performed by: FAMILY MEDICINE

## 2022-09-01 PROCEDURE — 3288F PR FALLS RISK ASSESSMENT DOCUMENTED: ICD-10-PCS | Mod: CPTII,S$GLB,, | Performed by: FAMILY MEDICINE

## 2022-09-01 PROCEDURE — 99999 PR PBB SHADOW E&M-EST. PATIENT-LVL V: ICD-10-PCS | Mod: PBBFAC,,, | Performed by: FAMILY MEDICINE

## 2022-09-01 RX ORDER — ALENDRONATE SODIUM 70 MG/1
70 TABLET ORAL
Qty: 4 TABLET | Refills: 3 | Status: SHIPPED | OUTPATIENT
Start: 2022-09-01 | End: 2023-03-13

## 2022-09-01 RX ORDER — ROSUVASTATIN CALCIUM 10 MG/1
10 TABLET, COATED ORAL DAILY
Qty: 90 TABLET | Refills: 3 | Status: SHIPPED | OUTPATIENT
Start: 2022-09-01 | End: 2023-08-22

## 2022-09-01 RX ORDER — AZELASTINE 1 MG/ML
1 SPRAY, METERED NASAL 2 TIMES DAILY
Qty: 30 ML | Refills: 6 | Status: SHIPPED | OUTPATIENT
Start: 2022-09-01 | End: 2023-03-13

## 2022-09-01 RX ORDER — FLUTICASONE PROPIONATE 50 MCG
1 SPRAY, SUSPENSION (ML) NASAL DAILY
Qty: 48 G | Refills: 3 | Status: SHIPPED | OUTPATIENT
Start: 2022-09-01 | End: 2023-03-13

## 2022-09-01 NOTE — PROGRESS NOTES
Subjective:       Patient ID: Sury Gao is a 76 y.o. female.    Chief Complaint: Follow-up and Thyroid Problem    HPI    77 y/o female former smoker (quit in the 70's) with hyperlipidemia, hypothyroidism, history of colon polyps, diverticulosis, osteopenia, history of basal cell carcinoma of skin, questionable history of TIA, hx of Covid is here for follow up.      She is feeling good, weight up 4 pounds, she is trying to eat healthy. She is active and does Pilates 2 days a week and does some walking. She denies f/n/v/d/constipation/cp/sob/urinary sx. She took Lasix last month. Sleeping ok. Mood ok.    She reports an episode where she felt off balance a few months ago, she denies lightheadedness/dizziness/presyncope when this happened and no allergic symptoms;  she reports yesterday and today she is feeling more allergic, she has sinus pressure, runny nose and feels a little lightheaded, she feels weak a little, no presyncope.  She reports when this happens she typically takes allegra for a few days and symptoms resolve. This happens 2-3 times a year and the fall is usually one of them.      Hx of Covid 12/2021 mild outpatient  chronic R mid to lower back pain for about 10 years, no accident or injury, pain is constant but varies with intensity depending on what she is doing, worse with singing and vaccuming and sweeping, pain is a soreness usually 4/10, not radiating, no imaging done in the past or formal PT, using tylenol or ibuprofen 3 times a months  ?TIA: symptoms occurred post op hysterectomy 3 years ago, work up benign at , was seen by Dr. Escobar  HLPD: calcium score 0 (done for HRT clearance), crestor 10 mg daily  Hypothyroidism: levothyroxine 75 mcg daily   Leg swelling: has lasix 40 mg for prn use  GYN: followed by Dr. Zavala, estradiol, myrbetriq, mmg 8/2021 plans to do it every other year 8/2023  Osteoporosis: 2/2022, calcium and vit d daily  2/2018, colon polyps, diverticulosis repeat  "5 years  Eye exam utd Dr. jeter  Dental utd  Hx of BCC: followed by Dr. Rupert Molina  OTC: Ibuprofen a few times a month        Review of Systems  see HPI  Objective:      BP 98/60 (BP Location: Right arm, Patient Position: Sitting, BP Method: Medium (Manual))   Pulse (!) 57   Temp 98.2 °F (36.8 °C) (Oral)   Resp 18   Ht 5' 4" (1.626 m)   Wt 68.5 kg (151 lb 0.2 oz)   SpO2 98%   BMI 25.92 kg/m²   Physical Exam  Vitals and nursing note reviewed.   Constitutional:       Appearance: She is well-developed.   HENT:      Head: Normocephalic and atraumatic.      Right Ear: Tympanic membrane normal.      Left Ear: Tympanic membrane normal.   Neck:      Thyroid: No thyromegaly.   Cardiovascular:      Rate and Rhythm: Normal rate and regular rhythm.      Heart sounds: Normal heart sounds.   Pulmonary:      Effort: Pulmonary effort is normal. No respiratory distress.      Breath sounds: Normal breath sounds.   Abdominal:      General: Abdomen is flat. Bowel sounds are normal. There is no distension.      Palpations: Abdomen is soft. There is no mass.      Tenderness: There is no abdominal tenderness.   Musculoskeletal:      Cervical back: Normal range of motion and neck supple.      Right lower leg: No edema.      Left lower leg: No edema.   Lymphadenopathy:      Cervical: No cervical adenopathy.   Skin:     General: Skin is warm and dry.   Neurological:      Mental Status: She is alert.       Assessment:       1. Hyperlipidemia, unspecified hyperlipidemia type    2. Osteoporosis, unspecified osteoporosis type, unspecified pathological fracture presence    3. Hypothyroidism, unspecified type    4. Diverticulosis    5. History of basal cell carcinoma (BCC) of skin    6. History of COVID-19    7. Episodic lightheadedness    8. Allergy to environmental factors        Plan:   Sury was seen today for follow-up and thyroid problem.    Diagnoses and all orders for this visit:    Hyperlipidemia, unspecified hyperlipidemia type  -  "    rosuvastatin (CRESTOR) 10 MG tablet; Take 1 tablet (10 mg total) by mouth once daily.  -     CBC Auto Differential; Future  -     Comprehensive Metabolic Panel; Future  -     Hemoglobin A1C; Future  -     Lipid Panel; Future    Osteoporosis, unspecified osteoporosis type, unspecified pathological fracture presence  -     alendronate (FOSAMAX) 70 MG tablet; Take 1 tablet (70 mg total) by mouth every 7 days.  -     Comprehensive Metabolic Panel; Future    Hypothyroidism, unspecified type  -     CBC Auto Differential; Future  -     Comprehensive Metabolic Panel; Future  -     TSH; Future    Diverticulosis    History of basal cell carcinoma (BCC) of skin    History of COVID-19    Episodic lightheadedness  -     IN OFFICE EKG 12-LEAD (to Muse)  -     fluticasone propionate (FLONASE) 50 mcg/actuation nasal spray; 1 spray (50 mcg total) by Each Nostril route once daily.  -     azelastine (ASTELIN) 137 mcg (0.1 %) nasal spray; 1 spray (137 mcg total) by Nasal route 2 (two) times daily.    Allergy to environmental factors  -     fluticasone propionate (FLONASE) 50 mcg/actuation nasal spray; 1 spray (50 mcg total) by Each Nostril route once daily.  -     azelastine (ASTELIN) 137 mcg (0.1 %) nasal spray; 1 spray (137 mcg total) by Nasal route 2 (two) times daily.    Discussed vestibular PT declined for now, discussed allergic symptoms may be contributing of episode of off balance

## 2022-09-12 ENCOUNTER — OFFICE VISIT (OUTPATIENT)
Dept: URGENT CARE | Facility: CLINIC | Age: 77
End: 2022-09-12
Payer: MEDICARE

## 2022-09-12 VITALS
HEIGHT: 64 IN | SYSTOLIC BLOOD PRESSURE: 108 MMHG | TEMPERATURE: 98 F | BODY MASS INDEX: 25.78 KG/M2 | OXYGEN SATURATION: 95 % | WEIGHT: 151 LBS | DIASTOLIC BLOOD PRESSURE: 62 MMHG | HEART RATE: 70 BPM | RESPIRATION RATE: 18 BRPM

## 2022-09-12 DIAGNOSIS — R09.82 POST-NASAL DRIP: ICD-10-CM

## 2022-09-12 DIAGNOSIS — R05.9 COUGH: Primary | ICD-10-CM

## 2022-09-12 LAB
CTP QC/QA: YES
SARS-COV-2 RDRP RESP QL NAA+PROBE: NEGATIVE

## 2022-09-12 PROCEDURE — 1159F MED LIST DOCD IN RCRD: CPT | Mod: CPTII,S$GLB,, | Performed by: NURSE PRACTITIONER

## 2022-09-12 PROCEDURE — U0002: ICD-10-PCS | Mod: QW,S$GLB,, | Performed by: NURSE PRACTITIONER

## 2022-09-12 PROCEDURE — 99213 PR OFFICE/OUTPT VISIT, EST, LEVL III, 20-29 MIN: ICD-10-PCS | Mod: S$GLB,,, | Performed by: NURSE PRACTITIONER

## 2022-09-12 PROCEDURE — 1159F PR MEDICATION LIST DOCUMENTED IN MEDICAL RECORD: ICD-10-PCS | Mod: CPTII,S$GLB,, | Performed by: NURSE PRACTITIONER

## 2022-09-12 PROCEDURE — 3078F DIAST BP <80 MM HG: CPT | Mod: CPTII,S$GLB,, | Performed by: NURSE PRACTITIONER

## 2022-09-12 PROCEDURE — 3074F PR MOST RECENT SYSTOLIC BLOOD PRESSURE < 130 MM HG: ICD-10-PCS | Mod: CPTII,S$GLB,, | Performed by: NURSE PRACTITIONER

## 2022-09-12 PROCEDURE — 1160F RVW MEDS BY RX/DR IN RCRD: CPT | Mod: CPTII,S$GLB,, | Performed by: NURSE PRACTITIONER

## 2022-09-12 PROCEDURE — 1160F PR REVIEW ALL MEDS BY PRESCRIBER/CLIN PHARMACIST DOCUMENTED: ICD-10-PCS | Mod: CPTII,S$GLB,, | Performed by: NURSE PRACTITIONER

## 2022-09-12 PROCEDURE — 99213 OFFICE O/P EST LOW 20 MIN: CPT | Mod: S$GLB,,, | Performed by: NURSE PRACTITIONER

## 2022-09-12 PROCEDURE — 3074F SYST BP LT 130 MM HG: CPT | Mod: CPTII,S$GLB,, | Performed by: NURSE PRACTITIONER

## 2022-09-12 PROCEDURE — 3078F PR MOST RECENT DIASTOLIC BLOOD PRESSURE < 80 MM HG: ICD-10-PCS | Mod: CPTII,S$GLB,, | Performed by: NURSE PRACTITIONER

## 2022-09-12 PROCEDURE — 1125F AMNT PAIN NOTED PAIN PRSNT: CPT | Mod: CPTII,S$GLB,, | Performed by: NURSE PRACTITIONER

## 2022-09-12 PROCEDURE — 1125F PR PAIN SEVERITY QUANTIFIED, PAIN PRESENT: ICD-10-PCS | Mod: CPTII,S$GLB,, | Performed by: NURSE PRACTITIONER

## 2022-09-12 PROCEDURE — U0002 COVID-19 LAB TEST NON-CDC: HCPCS | Mod: QW,S$GLB,, | Performed by: NURSE PRACTITIONER

## 2022-09-12 NOTE — PROGRESS NOTES
"Subjective:       Patient ID: Sury Gao is a 76 y.o. female.    Vitals:  height is 5' 4" (1.626 m) and weight is 68.5 kg (151 lb 0.2 oz). Her oral temperature is 98.4 °F (36.9 °C). Her blood pressure is 108/62 and her pulse is 70. Her respiration is 18 and oxygen saturation is 95%.     Chief Complaint: Cough    Patient states that she has been experiencing a cough and a loss of voice starting Friday. Patient tried taking Flonase, antihistamine spray and allegra all of which she started yesterday.     Cough  This is a new problem. The current episode started in the past 7 days. The problem has been gradually worsening. The problem occurs constantly. The cough is Non-productive. Associated symptoms include nasal congestion, postnasal drip and a sore throat. Pertinent negatives include no chest pain, chills, ear pain, fever, headaches, myalgias, rash, shortness of breath or wheezing. Nothing aggravates the symptoms. She has tried OTC cough suppressant for the symptoms. The treatment provided no relief.     Constitution: Negative for chills, sweating, fatigue and fever.   HENT:  Positive for postnasal drip, sore throat and voice change. Negative for ear pain, ear discharge, tinnitus, hearing loss, congestion, sinus pain, sinus pressure and trouble swallowing.    Neck: Negative for neck pain and painful lymph nodes.   Cardiovascular:  Negative for chest pain, palpitations and sob on exertion.   Respiratory:  Positive for cough. Negative for sputum production, shortness of breath and wheezing.    Gastrointestinal:  Negative for abdominal pain, nausea, vomiting and diarrhea.   Musculoskeletal:  Negative for muscle ache.   Skin:  Negative for color change, pale and rash.   Allergic/Immunologic: Negative for sneezing.   Neurological:  Negative for headaches, numbness and tingling.   Hematologic/Lymphatic: Negative for swollen lymph nodes.     Objective:      Physical Exam   Constitutional: She is oriented to " person, place, and time. She appears well-developed.  Non-toxic appearance. She does not appear ill. No distress.   HENT:   Head: Normocephalic and atraumatic.   Ears:   Right Ear: Hearing, tympanic membrane, external ear and ear canal normal.   Left Ear: Hearing, tympanic membrane, external ear and ear canal normal.   Nose: Nose normal. No mucosal edema, rhinorrhea, nasal deformity or congestion. No epistaxis. Right sinus exhibits no maxillary sinus tenderness and no frontal sinus tenderness. Left sinus exhibits no maxillary sinus tenderness and no frontal sinus tenderness.   Mouth/Throat: Uvula is midline and mucous membranes are normal. No trismus in the jaw. Normal dentition. No uvula swelling. Posterior oropharyngeal erythema present. No oropharyngeal exudate or posterior oropharyngeal edema.   Eyes: Conjunctivae and lids are normal. Right eye exhibits no discharge. Left eye exhibits no discharge. No scleral icterus.   Neck: Trachea normal and phonation normal. Neck supple. No edema present. No erythema present. No neck rigidity present.   Cardiovascular: Normal rate, regular rhythm and normal heart sounds.   Pulmonary/Chest: Effort normal and breath sounds normal. No stridor. No respiratory distress. She has no decreased breath sounds. She has no wheezes. She has no rhonchi. She has no rales. She exhibits no tenderness.   Abdominal: Normal appearance.   Musculoskeletal: Normal range of motion.         General: No deformity. Normal range of motion.      Cervical back: She exhibits no tenderness.   Lymphadenopathy:     She has no cervical adenopathy.   Neurological: She is alert and oriented to person, place, and time. She exhibits normal muscle tone. Coordination normal.   Skin: Skin is warm, dry, intact, not diaphoretic and not pale.   Nursing note and vitals reviewed.      Results for orders placed or performed in visit on 09/12/22   POCT COVID-19 Rapid Screening   Result Value Ref Range    POC Rapid COVID  Negative Negative     Acceptable Yes        Assessment:       1. Cough    2. Post-nasal drip          Plan:         Cough  -     POCT COVID-19 Rapid Screening    Post-nasal drip - environmental allergies vs viral URI, symptomatic care                 Patient Instructions   See additional patient Instructions provided    - Rest.    - Drink plenty of fluids.  - Viral upper respiratory infections typically run their course in 10-14 days.     - Tylenol or Ibuprofen as directed as needed for fever/pain. Avoid tylenol if you have a history of liver disease. Do not take ibuprofen if you have a history of GI bleeding, kidney disease, or if you take blood thinners.     - You can take over-the-counter claritin, zyrtec, allegra, or xyzal as directed. These are antihistamines that can help with runny nose, nasal congestion, sneezing, and helps to dry up post-nasal drip, which usually causes sore throat and cough.   - If you do NOT have high blood pressure, you may use a decongestant form (D)  of this medication (ie. Claritin- D, zyrtec-D, allegra-D) or if you do not take the D form, you can take sudafed (pseudoephedrine) over the counter, which is a decongestant. Do NOT take two decongestant (D) medications at the same time (such as mucinex-D and claritin-D or plain sudafed and claritin D)    - You can use Flonase (fluticasone) nasal spray as directed for sinus congestion and postnasal drip. This is a steroid nasal spray that works locally over time to decrease the inflammation in your nose/sinuses and help with allergic symptoms. This is not an quick- relief spray like afrin, but it works well if used daily.  Discontinue if you develop nose bleed  - use nasal saline prior to Flonase.  - Use Ocean Spray Nasal Saline 1-3 puffs each nostril every 2-3 hours then blow out onto tissue. This is to irrigate the nasal passage way to clear the sinus openings. Use until sinus problem resolved.    - you can take plain  Mucinex (guaifenesin) 1200 mg twice a day to help loosen mucous.     -warm salt water gargles can help with sore throat    - warm tea with honey can help with cough. Honey is a natural cough suppressant.    - Dextromethorphan (DM) is a cough suppressant over the counter (ie. mucinex DM, robitussin, delsym; dayquil/nyquil has DM as well.)    - Follow up with your PCP or specialty clinic as directed in the next 1-2 weeks if not improved or as needed.  You can call [phone] to schedule an appointment with the appropriate provider.      - Go to the ER if you develop new or worsening symptoms.     - You must understand that you have received an Urgent Care treatment only and that you may be released before all of your medical problems are known or treated.   - You, the patient, will arrange for follow up care as instructed.   - If your condition worsens or fails to improve we recommend that you receive another evaluation at the ER immediately or contact your PCP to discuss your concerns or return here.     Patient Instructions   - You must understand that you have received an Urgent Care treatment only and that you may be released before all of your medical problems are known or treated.   - You, the patient, will arrange for follow up care as instructed.   - If your condition worsens or fails to improve we recommend that you receive another evaluation at the ER immediately or contact your PCP to discuss your concerns or return here.     Advised on return/follow-up precautions. Advised on ER precautions. Answered all patient questions. Patient verbalized understanding and voiced agreement with current treatment plan.

## 2022-09-12 NOTE — PATIENT INSTRUCTIONS
See additional patient Instructions provided    - Rest.    - Drink plenty of fluids.  - Viral upper respiratory infections typically run their course in 10-14 days.     - Tylenol or Ibuprofen as directed as needed for fever/pain. Avoid tylenol if you have a history of liver disease. Do not take ibuprofen if you have a history of GI bleeding, kidney disease, or if you take blood thinners.     - You can take over-the-counter claritin, zyrtec, allegra, or xyzal as directed. These are antihistamines that can help with runny nose, nasal congestion, sneezing, and helps to dry up post-nasal drip, which usually causes sore throat and cough.   - If you do NOT have high blood pressure, you may use a decongestant form (D)  of this medication (ie. Claritin- D, zyrtec-D, allegra-D) or if you do not take the D form, you can take sudafed (pseudoephedrine) over the counter, which is a decongestant. Do NOT take two decongestant (D) medications at the same time (such as mucinex-D and claritin-D or plain sudafed and claritin D)    - You can use Flonase (fluticasone) nasal spray as directed for sinus congestion and postnasal drip. This is a steroid nasal spray that works locally over time to decrease the inflammation in your nose/sinuses and help with allergic symptoms. This is not an quick- relief spray like afrin, but it works well if used daily.  Discontinue if you develop nose bleed  - use nasal saline prior to Flonase.  - Use Ocean Spray Nasal Saline 1-3 puffs each nostril every 2-3 hours then blow out onto tissue. This is to irrigate the nasal passage way to clear the sinus openings. Use until sinus problem resolved.    - you can take plain Mucinex (guaifenesin) 1200 mg twice a day to help loosen mucous.     -warm salt water gargles can help with sore throat    - warm tea with honey can help with cough. Honey is a natural cough suppressant.    - Dextromethorphan (DM) is a cough suppressant over the counter (ie. mucinex DM,  robitussin, delsym; dayquil/nyquil has DM as well.)    - Follow up with your PCP or specialty clinic as directed in the next 1-2 weeks if not improved or as needed.  You can call [phone] to schedule an appointment with the appropriate provider.      - Go to the ER if you develop new or worsening symptoms.     - You must understand that you have received an Urgent Care treatment only and that you may be released before all of your medical problems are known or treated.   - You, the patient, will arrange for follow up care as instructed.   - If your condition worsens or fails to improve we recommend that you receive another evaluation at the ER immediately or contact your PCP to discuss your concerns or return here.     Patient Instructions   - You must understand that you have received an Urgent Care treatment only and that you may be released before all of your medical problems are known or treated.   - You, the patient, will arrange for follow up care as instructed.   - If your condition worsens or fails to improve we recommend that you receive another evaluation at the ER immediately or contact your PCP to discuss your concerns or return here.     Advised on return/follow-up precautions. Advised on ER precautions. Answered all patient questions. Patient verbalized understanding and voiced agreement with current treatment plan.

## 2022-09-14 ENCOUNTER — TELEPHONE (OUTPATIENT)
Dept: OTOLARYNGOLOGY | Facility: CLINIC | Age: 77
End: 2022-09-14
Payer: MEDICARE

## 2022-09-14 NOTE — TELEPHONE ENCOUNTER
----- Message from Luisana Mendoza RN sent at 9/14/2022  8:29 AM CDT -----  Regarding: FW: Appt  Contact: pt @ [phone]  Dr. Busby has no availability until late October. This also seems to be more general ENT symptoms.    Thank you,  REYES Lieberman    ----- Message -----  From: Latosha Raygoza  Sent: 9/14/2022   8:22 AM CDT  To: Kehinde ALVAREZ Staff  Subject: Appt                                             Pt feel the need to be seen today. Very congested, post nasal drainage and cannot talk. Throat is hurting . Asking for a call back

## 2022-11-17 ENCOUNTER — PES CALL (OUTPATIENT)
Dept: ADMINISTRATIVE | Facility: CLINIC | Age: 77
End: 2022-11-17
Payer: MEDICARE

## 2022-12-03 ENCOUNTER — PES CALL (OUTPATIENT)
Dept: ADMINISTRATIVE | Facility: CLINIC | Age: 77
End: 2022-12-03
Payer: MEDICARE

## 2023-01-05 NOTE — ASSESSMENT & PLAN NOTE
discussed implications  Reassuring not fracturing.   rda calcium and vit D  Follow over time  discussed indications for offering therapy       "Oncology Rooming Note    January 5, 2023 7:01 AM   Yahaira Fuentes is a 64 year old female who presents for:    Chief Complaint   Patient presents with     Oncology Clinic Visit     Acute myeloid leukemia in remission     Initial Vitals: /81   Pulse 110   Temp 99.4  F (37.4  C)   Resp 16   Wt 94.5 kg (208 lb 5.4 oz)   SpO2 99%   BMI 36.91 kg/m   Estimated body mass index is 36.91 kg/m  as calculated from the following:    Height as of 11/22/22: 1.6 m (5' 2.99\").    Weight as of this encounter: 94.5 kg (208 lb 5.4 oz). Body surface area is 2.05 meters squared.  No Pain (0) Comment: Data Unavailable   No LMP recorded. Patient has had a hysterectomy.  Allergies reviewed: Yes  Medications reviewed: No - medication were note reviewed, pt has pharmacy appt today and will review medication there.   Medications: Medication refills not needed today.  Pharmacy name entered into [facility]:    [id number] [place] [id number] [address] AT [facility] [address]  [place] [address]      Rhianna Ariza CMA            "

## 2023-01-09 ENCOUNTER — OFFICE VISIT (OUTPATIENT)
Dept: INTERNAL MEDICINE | Facility: CLINIC | Age: 78
End: 2023-01-09
Payer: MEDICARE

## 2023-01-09 VITALS
WEIGHT: 149.38 LBS | RESPIRATION RATE: 14 BRPM | OXYGEN SATURATION: 97 % | BODY MASS INDEX: 25.5 KG/M2 | HEIGHT: 64 IN | HEART RATE: 61 BPM | SYSTOLIC BLOOD PRESSURE: 110 MMHG | DIASTOLIC BLOOD PRESSURE: 60 MMHG

## 2023-01-09 DIAGNOSIS — E78.5 HYPERLIPIDEMIA, UNSPECIFIED HYPERLIPIDEMIA TYPE: ICD-10-CM

## 2023-01-09 DIAGNOSIS — Z86.010 HISTORY OF COLON POLYPS: ICD-10-CM

## 2023-01-09 DIAGNOSIS — Z12.31 ENCOUNTER FOR SCREENING MAMMOGRAM FOR MALIGNANT NEOPLASM OF BREAST: ICD-10-CM

## 2023-01-09 DIAGNOSIS — N95.9 MENOPAUSAL DISORDER: ICD-10-CM

## 2023-01-09 DIAGNOSIS — Z00.00 ENCOUNTER FOR PREVENTIVE HEALTH EXAMINATION: Primary | ICD-10-CM

## 2023-01-09 DIAGNOSIS — E66.3 OVERWEIGHT (BMI 25.0-29.9): ICD-10-CM

## 2023-01-09 DIAGNOSIS — E03.9 HYPOTHYROIDISM, UNSPECIFIED TYPE: ICD-10-CM

## 2023-01-09 DIAGNOSIS — M85.80 OSTEOPENIA, UNSPECIFIED LOCATION: ICD-10-CM

## 2023-01-09 DIAGNOSIS — Z13.820 SCREENING FOR OSTEOPOROSIS: ICD-10-CM

## 2023-01-09 PROCEDURE — 3074F PR MOST RECENT SYSTOLIC BLOOD PRESSURE < 130 MM HG: ICD-10-PCS | Mod: HCNC,CPTII,S$GLB, | Performed by: NURSE PRACTITIONER

## 2023-01-09 PROCEDURE — 1126F PR PAIN SEVERITY QUANTIFIED, NO PAIN PRESENT: ICD-10-PCS | Mod: HCNC,CPTII,S$GLB, | Performed by: NURSE PRACTITIONER

## 2023-01-09 PROCEDURE — 1160F RVW MEDS BY RX/DR IN RCRD: CPT | Mod: HCNC,CPTII,S$GLB, | Performed by: NURSE PRACTITIONER

## 2023-01-09 PROCEDURE — G0439 PPPS, SUBSEQ VISIT: HCPCS | Mod: HCNC,S$GLB,, | Performed by: NURSE PRACTITIONER

## 2023-01-09 PROCEDURE — 3288F FALL RISK ASSESSMENT DOCD: CPT | Mod: HCNC,CPTII,S$GLB, | Performed by: NURSE PRACTITIONER

## 2023-01-09 PROCEDURE — 99999 PR PBB SHADOW E&M-EST. PATIENT-LVL V: ICD-10-PCS | Mod: PBBFAC,HCNC,, | Performed by: NURSE PRACTITIONER

## 2023-01-09 PROCEDURE — 1160F PR REVIEW ALL MEDS BY PRESCRIBER/CLIN PHARMACIST DOCUMENTED: ICD-10-PCS | Mod: HCNC,CPTII,S$GLB, | Performed by: NURSE PRACTITIONER

## 2023-01-09 PROCEDURE — 1170F PR FUNCTIONAL STATUS ASSESSED: ICD-10-PCS | Mod: HCNC,CPTII,S$GLB, | Performed by: NURSE PRACTITIONER

## 2023-01-09 PROCEDURE — 1159F MED LIST DOCD IN RCRD: CPT | Mod: HCNC,CPTII,S$GLB, | Performed by: NURSE PRACTITIONER

## 2023-01-09 PROCEDURE — 3078F DIAST BP <80 MM HG: CPT | Mod: HCNC,CPTII,S$GLB, | Performed by: NURSE PRACTITIONER

## 2023-01-09 PROCEDURE — 1101F PR PT FALLS ASSESS DOC 0-1 FALLS W/OUT INJ PAST YR: ICD-10-PCS | Mod: HCNC,CPTII,S$GLB, | Performed by: NURSE PRACTITIONER

## 2023-01-09 PROCEDURE — 3078F PR MOST RECENT DIASTOLIC BLOOD PRESSURE < 80 MM HG: ICD-10-PCS | Mod: HCNC,CPTII,S$GLB, | Performed by: NURSE PRACTITIONER

## 2023-01-09 PROCEDURE — 1101F PT FALLS ASSESS-DOCD LE1/YR: CPT | Mod: HCNC,CPTII,S$GLB, | Performed by: NURSE PRACTITIONER

## 2023-01-09 PROCEDURE — 1126F AMNT PAIN NOTED NONE PRSNT: CPT | Mod: HCNC,CPTII,S$GLB, | Performed by: NURSE PRACTITIONER

## 2023-01-09 PROCEDURE — 1159F PR MEDICATION LIST DOCUMENTED IN MEDICAL RECORD: ICD-10-PCS | Mod: HCNC,CPTII,S$GLB, | Performed by: NURSE PRACTITIONER

## 2023-01-09 PROCEDURE — 3288F PR FALLS RISK ASSESSMENT DOCUMENTED: ICD-10-PCS | Mod: HCNC,CPTII,S$GLB, | Performed by: NURSE PRACTITIONER

## 2023-01-09 PROCEDURE — 99999 PR PBB SHADOW E&M-EST. PATIENT-LVL V: CPT | Mod: PBBFAC,HCNC,, | Performed by: NURSE PRACTITIONER

## 2023-01-09 PROCEDURE — 1170F FXNL STATUS ASSESSED: CPT | Mod: HCNC,CPTII,S$GLB, | Performed by: NURSE PRACTITIONER

## 2023-01-09 PROCEDURE — G0439 PR MEDICARE ANNUAL WELLNESS SUBSEQUENT VISIT: ICD-10-PCS | Mod: HCNC,S$GLB,, | Performed by: NURSE PRACTITIONER

## 2023-01-09 PROCEDURE — 3074F SYST BP LT 130 MM HG: CPT | Mod: HCNC,CPTII,S$GLB, | Performed by: NURSE PRACTITIONER

## 2023-01-09 NOTE — PATIENT INSTRUCTIONS
Counseling and Referral of Other Preventative  (Italic type indicates deductible and co-insurance are waived)    Patient Name: Sury Gao  Today's Date: 1/9/2023    Health Maintenance         Date Due Completion Date    Lipid Panel Scheduled   11/29/2021    Influenza Vaccine (1) 01/16/2023 (Originally 9/1/2022) 11/15/2021    DEXA Scan 02/14/2024 2/14/2022        TETANUS VACCINE    GYN exam   scheduled    Mammogram    Derm- current    colonoscopy     12/22/2031        Scheduled        2/23/2023 12/22/2021 2/23/2018          No orders of the defined types were placed in this encounter.    The following information is provided to all patients.  This information is to help you find resources for any of the problems found today that may be affecting your health:                Living healthy guide: www.[facility].[place].gov      Understanding Diabetes: www.diabetes.org      Eating healthy: www.cdc.gov/healthyweight      [facility] home safety checklist: www.cdc.gov/steadi/patient.html      Agency on Aging: www.goea.[place].[facility]      Alcoholics anonymous (AA): www.aa.org      Physical Activity: www.[place].nih.gov/sy7vhlt      Tobacco use: www.quitwithusla.org

## 2023-01-09 NOTE — PROGRESS NOTES
"Sury Gao presented for a  Medicare AWV and comprehensive Health Risk Assessment today. The following components were reviewed and updated:    Medical history  Family History  Social history  Allergies and Current Medications  Health Risk Assessment  Health Maintenance  Care Team     ** See Completed Assessments for Annual Wellness Visit within the encounter summary.**       The following assessments were completed:  Living Situation  CAGE  Depression Screening  Timed Get Up and Go  Whisper Test  Cognitive Function Screening  Nutrition Screening  ADL Screening  PAQ Screening    Vitals:    01/09/23 1007   BP: 110/60   BP Location: Right arm   Patient Position: Sitting   Pulse: 61   Resp: 14   SpO2: 97%   Weight: 67.8 kg (149 lb 5.8 oz)   Height: 5' 4" (1.626 m)     Body mass index is 25.64 kg/m².  Physical Exam  Constitutional:       Comments: Younger in appearance than age   HENT:      Right Ear: There is no impacted cerumen.      Left Ear: There is no impacted cerumen.   Eyes:      General: No scleral icterus.  Cardiovascular:      Rate and Rhythm: Normal rate and regular rhythm.   Pulmonary:      Effort: Pulmonary effort is normal.      Breath sounds: Normal breath sounds.   Abdominal:      Palpations: Abdomen is soft.   Musculoskeletal:         General: No swelling. Normal range of motion.   Skin:     General: Skin is warm and dry.   Neurological:      Mental Status: She is alert and oriented to person, place, and time.   Psychiatric:         Mood and Affect: Mood normal.         Behavior: Behavior normal.         Thought Content: Thought content normal.         Judgment: Judgment normal.          Opioid screening has been done- patient denies taking opioid medication.  Review for substance use disorder screen-  patient denies using substances.      Diagnoses and health risks identified today and associated recommendations/orders:    1. Hypothyroidism, unspecified type  Stable on levothyroxine as prescribe & " followed by PCP    2. Hyperlipidemia, unspecified hyperlipidemia type  Stable on crestor as prescribe & followed by PCP      3. Osteopenia, unspecified location  Stable on fosamax as prescribe & followed by PCP-denies dental & GI complaints- DXA 2/14/2022      4. Encounter for preventive health examination  Here for Health Risk Assessment/Annual Wellness Visit.  Health maintenance reviewed and updated. Follow up in one year.        5. Screening for breast cancer  - Mammo Digital Screening Bilat w/ Asim; Future    6. Encounter for screening mammogram for malignant neoplasm of breast  - Mammo Digital Screening Bilat w/ Asim; Future    7. Overweight (BMI 25.0-29.9)  Chronic. Followed by PCP.   Centers for Disease Control and Prevention (CDC)  weight recommendations for current BMI & ideal BMI range discussed with patient.  Recommended  gradual weight loss,  mediterranean diet , structured regular exercise every day.  Active female with varied activities.    8. Menopausal disorder  Stable on estrace as prescribe & followed by GYN-next exam in March 9. History of colon polyps  Stable followed by PCP- colonoscopy due 3/23/2023      Provided Sury with a 5-10 year written screening schedule and personal prevention plan. Recommendations were developed using the USPSTF age appropriate recommendations. Education, counseling, and referrals were provided as needed. After Visit Summary printed and given to patient which includes a list of additional screenings\tests needed. Cognitive function clock drawing was labeled with the patient's identification & forwarded to medical records to be scanned into the patient's epic chart.  Current on external derm exam.   follow-up in 1 year HRA.    Kerry Vazquez NP I offered to discuss advanced care planning, including how to pick a person who would make decisions for you if you were unable to make them for yourself, called a health care power of , and what kind of  decisions you might make such as use of life sustaining treatments such as ventilators and tube feeding when faced with a life limiting illness recorded on a living will that they will need to know. (How you want to be cared for as you near the end of your natural life)     X  Patient has advanced directives written and agrees to provide copies to the institution.

## 2023-01-24 ENCOUNTER — LAB VISIT (OUTPATIENT)
Dept: LAB | Facility: HOSPITAL | Age: 78
End: 2023-01-24
Attending: FAMILY MEDICINE
Payer: MEDICARE

## 2023-01-24 DIAGNOSIS — M81.0 OSTEOPOROSIS, UNSPECIFIED OSTEOPOROSIS TYPE, UNSPECIFIED PATHOLOGICAL FRACTURE PRESENCE: ICD-10-CM

## 2023-01-24 DIAGNOSIS — E03.9 HYPOTHYROIDISM, UNSPECIFIED TYPE: ICD-10-CM

## 2023-01-24 DIAGNOSIS — E78.5 HYPERLIPIDEMIA, UNSPECIFIED HYPERLIPIDEMIA TYPE: ICD-10-CM

## 2023-01-24 LAB
ALBUMIN SERPL BCP-MCNC: 3.2 G/DL (ref 3.5–5.2)
ALP SERPL-CCNC: 31 U/L (ref 55–135)
ALT SERPL W/O P-5'-P-CCNC: 11 U/L (ref 10–44)
ANION GAP SERPL CALC-SCNC: 6 MMOL/L (ref 8–16)
AST SERPL-CCNC: 16 U/L (ref 10–40)
BASOPHILS # BLD AUTO: 0.08 K/UL (ref 0–0.2)
BASOPHILS NFR BLD: 1.4 % (ref 0–1.9)
BILIRUB SERPL-MCNC: 0.5 MG/DL (ref 0.1–1)
BUN SERPL-MCNC: 19 MG/DL (ref 8–23)
CALCIUM SERPL-MCNC: 8.7 MG/DL (ref 8.7–10.5)
CHLORIDE SERPL-SCNC: 107 MMOL/L (ref 95–110)
CHOLEST SERPL-MCNC: 149 MG/DL (ref 120–199)
CHOLEST/HDLC SERPL: 2.4 {RATIO} (ref 2–5)
CO2 SERPL-SCNC: 27 MMOL/L (ref 23–29)
CREAT SERPL-MCNC: 0.9 MG/DL (ref 0.5–1.4)
DIFFERENTIAL METHOD: ABNORMAL
EOSINOPHIL # BLD AUTO: 0.1 K/UL (ref 0–0.5)
EOSINOPHIL NFR BLD: 2 % (ref 0–8)
ERYTHROCYTE [DISTWIDTH] IN BLOOD BY AUTOMATED COUNT: 12.7 % (ref 11.5–14.5)
EST. GFR  (NO RACE VARIABLE): >60 ML/MIN/1.73 M^2
ESTIMATED AVG GLUCOSE: 103 MG/DL (ref 68–131)
GLUCOSE SERPL-MCNC: 87 MG/DL (ref 70–110)
HBA1C MFR BLD: 5.2 % (ref 4–5.6)
HCT VFR BLD AUTO: 46.5 % (ref 37–48.5)
HDLC SERPL-MCNC: 63 MG/DL (ref 40–75)
HDLC SERPL: 42.3 % (ref 20–50)
HGB BLD-MCNC: 14.6 G/DL (ref 12–16)
IMM GRANULOCYTES # BLD AUTO: 0.03 K/UL (ref 0–0.04)
IMM GRANULOCYTES NFR BLD AUTO: 0.5 % (ref 0–0.5)
LDLC SERPL CALC-MCNC: 75.2 MG/DL (ref 63–159)
LYMPHOCYTES # BLD AUTO: 2 K/UL (ref 1–4.8)
LYMPHOCYTES NFR BLD: 35 % (ref 18–48)
MCH RBC QN AUTO: 31 PG (ref 27–31)
MCHC RBC AUTO-ENTMCNC: 31.4 G/DL (ref 32–36)
MCV RBC AUTO: 99 FL (ref 82–98)
MONOCYTES # BLD AUTO: 0.4 K/UL (ref 0.3–1)
MONOCYTES NFR BLD: 7 % (ref 4–15)
NEUTROPHILS # BLD AUTO: 3 K/UL (ref 1.8–7.7)
NEUTROPHILS NFR BLD: 54.1 % (ref 38–73)
NONHDLC SERPL-MCNC: 86 MG/DL
NRBC BLD-RTO: 0 /100 WBC
PLATELET # BLD AUTO: 222 K/UL (ref 150–450)
PMV BLD AUTO: 11.6 FL (ref 9.2–12.9)
POTASSIUM SERPL-SCNC: 4.4 MMOL/L (ref 3.5–5.1)
PROT SERPL-MCNC: 5.6 G/DL (ref 6–8.4)
RBC # BLD AUTO: 4.71 M/UL (ref 4–5.4)
SODIUM SERPL-SCNC: 140 MMOL/L (ref 136–145)
TRIGL SERPL-MCNC: 54 MG/DL (ref 30–150)
TSH SERPL DL<=0.005 MIU/L-ACNC: 2.46 UIU/ML (ref 0.4–4)
WBC # BLD AUTO: 5.6 K/UL (ref 3.9–12.7)

## 2023-01-24 PROCEDURE — 85025 COMPLETE CBC W/AUTO DIFF WBC: CPT | Mod: HCNC | Performed by: FAMILY MEDICINE

## 2023-01-24 PROCEDURE — 80061 LIPID PANEL: CPT | Mod: HCNC | Performed by: FAMILY MEDICINE

## 2023-01-24 PROCEDURE — 36415 COLL VENOUS BLD VENIPUNCTURE: CPT | Mod: HCNC,PO | Performed by: FAMILY MEDICINE

## 2023-01-24 PROCEDURE — 83036 HEMOGLOBIN GLYCOSYLATED A1C: CPT | Mod: HCNC | Performed by: FAMILY MEDICINE

## 2023-01-24 PROCEDURE — 84443 ASSAY THYROID STIM HORMONE: CPT | Mod: HCNC | Performed by: FAMILY MEDICINE

## 2023-01-24 PROCEDURE — 80053 COMPREHEN METABOLIC PANEL: CPT | Mod: HCNC | Performed by: FAMILY MEDICINE

## 2023-02-07 DIAGNOSIS — Z00.00 ENCOUNTER FOR MEDICARE ANNUAL WELLNESS EXAM: ICD-10-CM

## 2023-02-09 DIAGNOSIS — Z00.00 ENCOUNTER FOR MEDICARE ANNUAL WELLNESS EXAM: ICD-10-CM

## 2023-03-06 DIAGNOSIS — E03.9 ACQUIRED HYPOTHYROIDISM: ICD-10-CM

## 2023-03-06 RX ORDER — LEVOTHYROXINE SODIUM 75 UG/1
TABLET ORAL
Qty: 90 TABLET | Refills: 1 | Status: SHIPPED | OUTPATIENT
Start: 2023-03-06 | End: 2023-10-24

## 2023-03-06 NOTE — TELEPHONE ENCOUNTER
No new care gaps identified.  [facility] Embedded Care Gaps. Reference number: [id number]. 3/06/2023   11:53:20 AM CST

## 2023-03-07 NOTE — TELEPHONE ENCOUNTER
Refill Decision Note   Sury Gao  is requesting a refill authorization.  Brief Assessment and Rationale for Refill:  Approve     Medication Therapy Plan:       Medication Reconciliation Completed: No   Comments:     No Care Gaps recommended.     Note composed:9:40 PM 03/06/2023

## 2023-03-13 ENCOUNTER — OFFICE VISIT (OUTPATIENT)
Dept: OBSTETRICS AND GYNECOLOGY | Facility: CLINIC | Age: 78
End: 2023-03-13
Payer: MEDICARE

## 2023-03-13 VITALS
HEIGHT: 64 IN | BODY MASS INDEX: 25.75 KG/M2 | SYSTOLIC BLOOD PRESSURE: 100 MMHG | DIASTOLIC BLOOD PRESSURE: 60 MMHG | WEIGHT: 150.81 LBS

## 2023-03-13 DIAGNOSIS — Z79.890 NEED FOR PROPHYLACTIC HORMONE REPLACEMENT THERAPY (POSTMENOPAUSAL): ICD-10-CM

## 2023-03-13 DIAGNOSIS — Z01.419 WELL FEMALE EXAM WITH ROUTINE GYNECOLOGICAL EXAM: Primary | ICD-10-CM

## 2023-03-13 PROBLEM — Z86.010 HISTORY OF COLON POLYPS: Status: RESOLVED | Noted: 2021-01-11 | Resolved: 2023-03-13

## 2023-03-13 PROBLEM — Z00.00 ENCOUNTER FOR PREVENTIVE HEALTH EXAMINATION: Chronic | Status: RESOLVED | Noted: 2023-01-09 | Resolved: 2023-03-13

## 2023-03-13 PROBLEM — Z86.16 HISTORY OF COVID-19: Status: RESOLVED | Noted: 2022-09-01 | Resolved: 2023-03-13

## 2023-03-13 PROBLEM — Z86.0100 HISTORY OF COLON POLYPS: Status: RESOLVED | Noted: 2021-01-11 | Resolved: 2023-03-13

## 2023-03-13 PROCEDURE — 3078F DIAST BP <80 MM HG: CPT | Mod: HCNC,CPTII,S$GLB, | Performed by: OBSTETRICS & GYNECOLOGY

## 2023-03-13 PROCEDURE — 1160F RVW MEDS BY RX/DR IN RCRD: CPT | Mod: HCNC,CPTII,S$GLB, | Performed by: OBSTETRICS & GYNECOLOGY

## 2023-03-13 PROCEDURE — 1160F PR REVIEW ALL MEDS BY PRESCRIBER/CLIN PHARMACIST DOCUMENTED: ICD-10-PCS | Mod: HCNC,CPTII,S$GLB, | Performed by: OBSTETRICS & GYNECOLOGY

## 2023-03-13 PROCEDURE — 3074F PR MOST RECENT SYSTOLIC BLOOD PRESSURE < 130 MM HG: ICD-10-PCS | Mod: HCNC,CPTII,S$GLB, | Performed by: OBSTETRICS & GYNECOLOGY

## 2023-03-13 PROCEDURE — G0101 PR CA SCREEN;PELVIC/BREAST EXAM: ICD-10-PCS | Mod: GZ,HCNC,S$GLB, | Performed by: OBSTETRICS & GYNECOLOGY

## 2023-03-13 PROCEDURE — 3288F FALL RISK ASSESSMENT DOCD: CPT | Mod: HCNC,CPTII,S$GLB, | Performed by: OBSTETRICS & GYNECOLOGY

## 2023-03-13 PROCEDURE — 3288F PR FALLS RISK ASSESSMENT DOCUMENTED: ICD-10-PCS | Mod: HCNC,CPTII,S$GLB, | Performed by: OBSTETRICS & GYNECOLOGY

## 2023-03-13 PROCEDURE — 1101F PT FALLS ASSESS-DOCD LE1/YR: CPT | Mod: HCNC,CPTII,S$GLB, | Performed by: OBSTETRICS & GYNECOLOGY

## 2023-03-13 PROCEDURE — 3074F SYST BP LT 130 MM HG: CPT | Mod: HCNC,CPTII,S$GLB, | Performed by: OBSTETRICS & GYNECOLOGY

## 2023-03-13 PROCEDURE — 99999 PR PBB SHADOW E&M-EST. PATIENT-LVL IV: CPT | Mod: PBBFAC,HCNC,, | Performed by: OBSTETRICS & GYNECOLOGY

## 2023-03-13 PROCEDURE — 1101F PR PT FALLS ASSESS DOC 0-1 FALLS W/OUT INJ PAST YR: ICD-10-PCS | Mod: HCNC,CPTII,S$GLB, | Performed by: OBSTETRICS & GYNECOLOGY

## 2023-03-13 PROCEDURE — G0101 CA SCREEN;PELVIC/BREAST EXAM: HCPCS | Mod: GZ,HCNC,S$GLB, | Performed by: OBSTETRICS & GYNECOLOGY

## 2023-03-13 PROCEDURE — 1126F AMNT PAIN NOTED NONE PRSNT: CPT | Mod: HCNC,CPTII,S$GLB, | Performed by: OBSTETRICS & GYNECOLOGY

## 2023-03-13 PROCEDURE — 99999 PR PBB SHADOW E&M-EST. PATIENT-LVL IV: ICD-10-PCS | Mod: PBBFAC,HCNC,, | Performed by: OBSTETRICS & GYNECOLOGY

## 2023-03-13 PROCEDURE — 1159F MED LIST DOCD IN RCRD: CPT | Mod: HCNC,CPTII,S$GLB, | Performed by: OBSTETRICS & GYNECOLOGY

## 2023-03-13 PROCEDURE — 1159F PR MEDICATION LIST DOCUMENTED IN MEDICAL RECORD: ICD-10-PCS | Mod: HCNC,CPTII,S$GLB, | Performed by: OBSTETRICS & GYNECOLOGY

## 2023-03-13 PROCEDURE — 1126F PR PAIN SEVERITY QUANTIFIED, NO PAIN PRESENT: ICD-10-PCS | Mod: HCNC,CPTII,S$GLB, | Performed by: OBSTETRICS & GYNECOLOGY

## 2023-03-13 PROCEDURE — 3078F PR MOST RECENT DIASTOLIC BLOOD PRESSURE < 80 MM HG: ICD-10-PCS | Mod: HCNC,CPTII,S$GLB, | Performed by: OBSTETRICS & GYNECOLOGY

## 2023-03-13 RX ORDER — ESTRADIOL 0.5 MG/1
0.5 TABLET ORAL DAILY
Qty: 90 TABLET | Refills: 3 | Status: SHIPPED | OUTPATIENT
Start: 2023-03-13 | End: 2024-01-22

## 2023-03-13 NOTE — PROGRESS NOTES
SUBJECTIVE:   77 y.o. female   for routine gyn exam. No LMP recorded. Patient has had a hysterectomy..  She desires refill of ERT for menopause sx. Aware of WHI.         Past Medical History:   Diagnosis Date    Hyperlipidemia     Hypertension     Hypothyroidism     Nuclear sclerosis 2014    Personal history of colonic polyps 2018    Thyroid disease     hypothyroid     Past Surgical History:   Procedure Laterality Date    Bilateral lower extremity varicose vein treatment       CATARACT EXTRACTION, BILATERAL Bilateral     done  & aug or 2022    COLONOSCOPY N/A 2018    CREATION OF URETHRAL SLING USING POLYPROPYLENE TAPE BY RETROPUBIC APPROACH N/A 2020    Procedure: SLING, RETROPUBIC    ;  Surgeon: Maxx Castellanos DO;  Location: [facility];  Service: OB/GYN;  Laterality: N/A;    DILATION AND CURETTAGE OF UTERUS      EYE SURGERY Bilateral     cataract 2022 and 2022    HIP REPLACEMENT ARTHROPLASTY Left     HYSTERECTOMY  2018    TLHBSO with Sacrocolpopexy for prolapse    JOINT REPLACEMENT      L hip    gutierrez neuroma      left foot    MINI-LAPAROTOMY      D&C perforation    OOPHORECTOMY  2018    TONSILLECTOMY      WRIST SURGERY      left wrist: Swave-Kapunjay surgery     Social History     Socioeconomic History    Marital status:    Occupational History    Occupation: retired  and law    Tobacco Use    Smoking status: Former     Packs/day: 0.25     Years: 10.00     Pack years: 2.50     Types: Cigarettes     Quit date: 6/10/1976     Years since quittin.7    Smokeless tobacco: Former   Substance and Sexual Activity    Alcohol use: Yes     Comment: social    Drug use: No    Sexual activity: Yes     Partners: Male     Birth control/protection: See Surgical Hx     Comment: Hysterectomy   Social History Narrative    Retired         Daughter 47 in [place], son [place] 43 [place] [employer] [employer]  at [place] 22         Social Determinants of Health      Financial Resource Strain: Low Risk     Difficulty of Paying Living Expenses: Not hard at all   Food Insecurity: No Food Insecurity    Worried About Running Out of Food in the Last Year: Never true    Ran Out of Food in the Last Year: Never true   Transportation Needs: No Transportation Needs    Lack of Transportation (Medical): No    Lack of Transportation (Non-Medical): No   Stress: No Stress Concern Present    Feeling of Stress : Not at all   Social Connections: Unknown    Marital Status:    Housing Stability: Unknown    Unable to Pay for Housing in the Last Year: No    Unstable Housing in the Last Year: No     Family History   Problem Relation Age of Onset    Hypothyroidism Mother     Cataracts Mother     Heart disease Mother         Valvular heart disease    Cataracts Father     Macular degeneration Father     Cancer Father         lung    Hypothyroidism Sister     Hypothyroidism Daughter     Cancer Brother         testicular cancer    Amblyopia Neg Hx     Blindness Neg Hx     Glaucoma Neg Hx     Retinal detachment Neg Hx     Strabismus Neg Hx     Breast cancer Neg Hx     Colon cancer Neg Hx     Diabetes Neg Hx     Hypertension Neg Hx     Ovarian cancer Neg Hx     Cervical cancer Neg Hx     Endometrial cancer Neg Hx     Vaginal cancer Neg Hx     Heart attack Neg Hx     Hyperlipidemia Neg Hx     Stroke Neg Hx     Heart failure Neg Hx      OB History    Para Term  AB Living   4 3 3   1 3   SAB IAB Ectopic Multiple Live Births   1       3      # Outcome Date GA Lbr Bertin/2nd Weight Sex Delivery Anes PTL Lv   4 SAB            3 Term            2 Term            1 Term                  Current Outpatient Medications   Medication Sig Dispense Refill    aspirin (ECOTRIN) 81 MG EC tablet Take 81 mg by mouth once daily.      calcium carbonate (OS-SUHAIL) 600 mg calcium (1,500 mg) Tab Take 1,200 mg by mouth once daily.      cholecalciferol, vitamin D3, (VITAMIN D3) 25 mcg (1,000 unit) capsule Take  1,000 Units by mouth once daily.      fexofenadine (ALLEGRA) 60 MG tablet Take 60 mg by mouth as needed.      ibuprofen (ADVIL,MOTRIN) 600 MG tablet Take 1 tablet (600 mg total) by mouth every 6 (six) hours as needed for Pain. (Patient taking differently: Take 600 mg by mouth every 6 (six) hours as needed for Pain.) 30 tablet 1    loratadine (CLARITIN) 10 mg tablet Take 10 mg by mouth daily as needed.      methocarbamoL (ROBAXIN) 500 MG Tab 1-2 tablets nightly for back spasm 30 tablet 0    mirabegron (MYRBETRIQ) 25 mg Tb24 ER tablet Take 1 tablet (25 mg total) by mouth once daily. 90 tablet 0    multivit,iron,minerals/lutein (CENTRUM SILVER ULTRA WOMEN'S ORAL) Take 1 tablet by mouth once daily.      rosuvastatin (CRESTOR) 10 MG tablet Take 1 tablet (10 mg total) by mouth once daily. 90 tablet 3    SYNTHROID 75 mcg tablet TAKE 1 TABLET EVERY DAY 90 tablet 1    vitC/E/Zn/copper/lutein/zeaxan (ICAPS AREDS2 ORAL) Take 2 capsules by mouth once daily.      estradioL (ESTRACE) 0.5 MG tablet Take 1 tablet (0.5 mg total) by mouth once daily. 90 tablet 3    furosemide (LASIX) 40 MG tablet Take 1 tablet (40 mg total) by mouth daily as needed (swelling). 90 tablet 2     No current facility-administered medications for this visit.     Allergies: Patient has no known allergies.     ROS:  Constitutional: no weight loss, weight gain, fever, fatigue  Eyes:  No vision changes, glasses/contacts  ENT/Mouth: No ulcers, sinus problems, ears ringing, headache  Cardiovascular: No inability to lie flat, chest pain, exercise intolerance, swelling, heart palpitations  Respiratory: No wheezing, coughing blood, shortness of breath, or cough  Gastrointestinal: No diarrhea, bloody stool, nausea/vomiting, constipation, gas, hemorrhoids  Genitourinary: No blood in urine, painful urination, urgency of urination, frequency of urination, incomplete emptying, incontinence, abnormal bleeding, painful periods, heavy periods, vaginal discharge, vaginal  "odor, painful intercourse, sexual problems, bleeding after intercourse.  Musculoskeletal: No muscle weakness  Skin/Breast: No painful breasts, nipple discharge, masses, rash, ulcers  Neurological: No passing out, seizures, numbness, headache  Endocrine: No diabetes, hypothyroid, hyperthyroid, hot flashes, hair loss, abnormal hair growth, acne  Psychiatric: No depression, crying  Hematologic: No bruises, bleeding, swollen lymph nodes, anemia.      OBJECTIVE:   The patient appears well, alert, oriented x 3, in no distress.  /60 (BP Location: Right arm, Patient Position: Sitting, BP Method: Medium (Manual))   Ht 5' 4" (1.626 m)   Wt 68.4 kg (150 lb 12.7 oz)   BMI 25.88 kg/m²   NECK: no thyromegaly, trachea midline  SKIN: no acne, striae, hirsutism  CHEST: CTAB  CV: RRR  BREAST EXAM: breasts appear normal, no suspicious masses, no skin or nipple changes or axillary nodes  ABDOMEN: no hernias, masses, or hepatosplenomegaly  GENITALIA: normal external genitalia, no erythema, no discharge  URETHRA: normal urethra, normal urethral meatus  VAGINA: Normal  CERVIX: absent  UTERUS: absent  ADNEXA: no mass, fullness, tenderness      ASSESSMENT:   1. Well female exam with routine gynecological exam        2. Need for prophylactic hormone replacement therapy (postmenopausal)            PLAN:   Orders Placed This Encounter    estradioL (ESTRACE) 0.5 MG tablet     Discussed ERT, WHI, healthy lifestyle including regular exercise, healthy eating, etc.  Return to clinic in 1 year    "

## 2023-04-27 DIAGNOSIS — R39.15 URGENCY OF URINATION: Primary | ICD-10-CM

## 2023-04-27 RX ORDER — MIRABEGRON 25 MG/1
25 TABLET, FILM COATED, EXTENDED RELEASE ORAL DAILY
Qty: 90 TABLET | Refills: 1 | Status: SHIPPED | OUTPATIENT
Start: 2023-04-27 | End: 2023-10-16

## 2023-05-03 DIAGNOSIS — Z12.31 ENCOUNTER FOR SCREENING MAMMOGRAM FOR MALIGNANT NEOPLASM OF BREAST: Primary | ICD-10-CM

## 2023-05-11 ENCOUNTER — HOSPITAL ENCOUNTER (OUTPATIENT)
Dept: RADIOLOGY | Facility: HOSPITAL | Age: 78
Discharge: HOME | End: 2023-05-11
Attending: NURSE PRACTITIONER
Payer: MEDICARE

## 2023-05-11 VITALS — WEIGHT: 150 LBS | BODY MASS INDEX: 25.61 KG/M2 | HEIGHT: 64 IN

## 2023-05-11 DIAGNOSIS — Z12.31 ENCOUNTER FOR SCREENING MAMMOGRAM FOR MALIGNANT NEOPLASM OF BREAST: ICD-10-CM

## 2023-05-11 PROCEDURE — 77067 SCR MAMMO BI INCL CAD: CPT | Mod: 26,HCNC,, | Performed by: RADIOLOGY

## 2023-05-11 PROCEDURE — 77063 BREAST TOMOSYNTHESIS BI: CPT | Mod: 26,HCNC,, | Performed by: RADIOLOGY

## 2023-05-11 PROCEDURE — 77067 SCR MAMMO BI INCL CAD: CPT | Mod: TC,HCNC,PO

## 2023-05-11 PROCEDURE — 77063 MAMMO DIGITAL SCREENING BILAT WITH TOMO: ICD-10-PCS | Mod: 26,HCNC,, | Performed by: RADIOLOGY

## 2023-05-11 PROCEDURE — 77067 MAMMO DIGITAL SCREENING BILAT WITH TOMO: ICD-10-PCS | Mod: 26,HCNC,, | Performed by: RADIOLOGY

## 2023-05-15 ENCOUNTER — NURSE TRIAGE (OUTPATIENT)
Dept: ADMINISTRATIVE | Facility: CLINIC | Age: 78
End: 2023-05-15
Payer: MEDICARE

## 2023-05-15 NOTE — TELEPHONE ENCOUNTER
Spoke with pt who is currently out of town, and calling from [facility]. Reports that she developed symptoms of hoarseness, and dry cough after jazz fest. Reports mild SOB with activity. Pt advised to be seen in ED/UC.Verbalized understanding      Reason for Disposition   MILD difficulty breathing (e.g., minimal/no SOB at rest, SOB with walking, pulse <100) and still present when not coughing    Additional Information   Negative: Bluish (or gray) lips or face   Negative: SEVERE difficulty breathing (e.g., struggling for each breath, speaks in single words)   Negative: Rapid onset of cough and has hives   Negative: Coughing started suddenly after medicine, an allergic food or bee sting   Negative: Difficulty breathing after exposure to flames, smoke, or fumes   Negative: Sounds like a life-threatening emergency to the triager   Negative: MODERATE difficulty breathing (e.g., speaks in phrases, SOB even at rest, pulse 100-120) and still present when not coughing   Negative: Chest pain present when not coughing   Negative: Passed out (i.e., fainted, collapsed and was not responding)   Negative: Patient sounds very sick or weak to the triager    Protocols used: Cough-A-OH

## 2023-05-16 NOTE — TELEPHONE ENCOUNTER
States that she is feeling a little better not 100%   She is  just resting follow up have been scheduled for when patient return from [place]

## 2023-05-29 ENCOUNTER — HOSPITAL ENCOUNTER (OUTPATIENT)
Dept: PULMONOLOGY | Facility: CLINIC | Age: 78
Discharge: HOME | End: 2023-05-29
Payer: MEDICARE

## 2023-05-29 ENCOUNTER — OFFICE VISIT (OUTPATIENT)
Dept: PRIMARY CARE CLINIC | Facility: CLINIC | Age: 78
End: 2023-05-29
Payer: MEDICARE

## 2023-05-29 ENCOUNTER — HOSPITAL ENCOUNTER (OUTPATIENT)
Dept: RADIOLOGY | Facility: HOSPITAL | Age: 78
Discharge: HOME | End: 2023-05-29
Attending: INTERNAL MEDICINE
Payer: MEDICARE

## 2023-05-29 VITALS
TEMPERATURE: 98 F | OXYGEN SATURATION: 95 % | HEART RATE: 63 BPM | DIASTOLIC BLOOD PRESSURE: 70 MMHG | BODY MASS INDEX: 26 KG/M2 | SYSTOLIC BLOOD PRESSURE: 105 MMHG | RESPIRATION RATE: 18 BRPM | HEIGHT: 64 IN | WEIGHT: 152.31 LBS

## 2023-05-29 DIAGNOSIS — R05.9 COUGH, UNSPECIFIED TYPE: Primary | ICD-10-CM

## 2023-05-29 DIAGNOSIS — R05.9 COUGH, UNSPECIFIED TYPE: ICD-10-CM

## 2023-05-29 DIAGNOSIS — R05.3 CHRONIC COUGH: ICD-10-CM

## 2023-05-29 PROCEDURE — 71046 X-RAY EXAM CHEST 2 VIEWS: CPT | Mod: 26,,, | Performed by: RADIOLOGY

## 2023-05-29 PROCEDURE — 1101F PR PT FALLS ASSESS DOC 0-1 FALLS W/OUT INJ PAST YR: ICD-10-PCS | Mod: CPTII,S$GLB,, | Performed by: INTERNAL MEDICINE

## 2023-05-29 PROCEDURE — 3288F FALL RISK ASSESSMENT DOCD: CPT | Mod: CPTII,S$GLB,, | Performed by: INTERNAL MEDICINE

## 2023-05-29 PROCEDURE — 3078F DIAST BP <80 MM HG: CPT | Mod: CPTII,S$GLB,, | Performed by: INTERNAL MEDICINE

## 2023-05-29 PROCEDURE — 3074F PR MOST RECENT SYSTOLIC BLOOD PRESSURE < 130 MM HG: ICD-10-PCS | Mod: CPTII,S$GLB,, | Performed by: INTERNAL MEDICINE

## 2023-05-29 PROCEDURE — 99999 PR PBB SHADOW E&M-EST. PATIENT-LVL IV: CPT | Mod: PBBFAC,,, | Performed by: INTERNAL MEDICINE

## 2023-05-29 PROCEDURE — 94010 BREATHING CAPACITY TEST: CPT | Mod: S$GLB,,, | Performed by: INTERNAL MEDICINE

## 2023-05-29 PROCEDURE — 94010 BREATHING CAPACITY TEST: ICD-10-PCS | Mod: S$GLB,,, | Performed by: INTERNAL MEDICINE

## 2023-05-29 PROCEDURE — 1126F AMNT PAIN NOTED NONE PRSNT: CPT | Mod: CPTII,S$GLB,, | Performed by: INTERNAL MEDICINE

## 2023-05-29 PROCEDURE — 1101F PT FALLS ASSESS-DOCD LE1/YR: CPT | Mod: CPTII,S$GLB,, | Performed by: INTERNAL MEDICINE

## 2023-05-29 PROCEDURE — 3288F PR FALLS RISK ASSESSMENT DOCUMENTED: ICD-10-PCS | Mod: CPTII,S$GLB,, | Performed by: INTERNAL MEDICINE

## 2023-05-29 PROCEDURE — 94729 DIFFUSING CAPACITY: CPT | Mod: S$GLB,,, | Performed by: INTERNAL MEDICINE

## 2023-05-29 PROCEDURE — 94729 PR C02/MEMBANE DIFFUSE CAPACITY: ICD-10-PCS | Mod: S$GLB,,, | Performed by: INTERNAL MEDICINE

## 2023-05-29 PROCEDURE — 3074F SYST BP LT 130 MM HG: CPT | Mod: CPTII,S$GLB,, | Performed by: INTERNAL MEDICINE

## 2023-05-29 PROCEDURE — 1126F PR PAIN SEVERITY QUANTIFIED, NO PAIN PRESENT: ICD-10-PCS | Mod: CPTII,S$GLB,, | Performed by: INTERNAL MEDICINE

## 2023-05-29 PROCEDURE — 99214 OFFICE O/P EST MOD 30 MIN: CPT | Mod: S$GLB,,, | Performed by: INTERNAL MEDICINE

## 2023-05-29 PROCEDURE — 71046 X-RAY EXAM CHEST 2 VIEWS: CPT | Mod: TC,PO

## 2023-05-29 PROCEDURE — 94727 PR PULM FUNCTION TEST BY GAS: ICD-10-PCS | Mod: S$GLB,,, | Performed by: INTERNAL MEDICINE

## 2023-05-29 PROCEDURE — 99999 PR PBB SHADOW E&M-EST. PATIENT-LVL IV: ICD-10-PCS | Mod: PBBFAC,,, | Performed by: INTERNAL MEDICINE

## 2023-05-29 PROCEDURE — 3078F PR MOST RECENT DIASTOLIC BLOOD PRESSURE < 80 MM HG: ICD-10-PCS | Mod: CPTII,S$GLB,, | Performed by: INTERNAL MEDICINE

## 2023-05-29 PROCEDURE — 71046 XR CHEST PA AND LATERAL: ICD-10-PCS | Mod: 26,,, | Performed by: RADIOLOGY

## 2023-05-29 PROCEDURE — 1159F MED LIST DOCD IN RCRD: CPT | Mod: CPTII,S$GLB,, | Performed by: INTERNAL MEDICINE

## 2023-05-29 PROCEDURE — 1159F PR MEDICATION LIST DOCUMENTED IN MEDICAL RECORD: ICD-10-PCS | Mod: CPTII,S$GLB,, | Performed by: INTERNAL MEDICINE

## 2023-05-29 PROCEDURE — 94727 GAS DIL/WSHOT DETER LNG VOL: CPT | Mod: S$GLB,,, | Performed by: INTERNAL MEDICINE

## 2023-05-29 PROCEDURE — 99214 PR OFFICE/OUTPT VISIT, EST, LEVL IV, 30-39 MIN: ICD-10-PCS | Mod: S$GLB,,, | Performed by: INTERNAL MEDICINE

## 2023-05-29 RX ORDER — METHYLPREDNISOLONE 4 MG/1
TABLET ORAL
Qty: 21 EACH | Refills: 0 | Status: SHIPPED | OUTPATIENT
Start: 2023-05-29 | End: 2023-06-19

## 2023-05-29 RX ORDER — FLUTICASONE PROPIONATE AND SALMETEROL 250; 50 UG/1; UG/1
1 POWDER RESPIRATORY (INHALATION) 2 TIMES DAILY
Qty: 60 EACH | Refills: 1 | Status: SHIPPED | OUTPATIENT
Start: 2023-05-29 | End: 2024-05-28

## 2023-05-29 RX ORDER — METHYLPREDNISOLONE 4 MG/1
TABLET ORAL
Qty: 21 EACH | Refills: 0 | Status: SHIPPED | OUTPATIENT
Start: 2023-05-29 | End: 2023-05-29

## 2023-05-29 RX ORDER — FLUTICASONE PROPIONATE AND SALMETEROL 250; 50 UG/1; UG/1
1 POWDER RESPIRATORY (INHALATION) 2 TIMES DAILY
Qty: 60 EACH | Refills: 1 | Status: SHIPPED | OUTPATIENT
Start: 2023-05-29 | End: 2023-05-29

## 2023-05-29 RX ORDER — ALBUTEROL SULFATE 90 UG/1
AEROSOL, METERED RESPIRATORY (INHALATION)
COMMUNITY
Start: 2023-05-15

## 2023-05-29 NOTE — PROGRESS NOTES
[facility] Progress Note  5/29/2023          Reason for Visit:      had concerns including Follow-up, Breathing Problem, Shortness of Breath, and Cough.       History of Present Illness:     Bronchitis - started after mothers day  Seen in urgent care in [place] while on vacation  Wheezing, fatigue, cough (non-productive)  Tx with azithromycin, cough medication, albuterol, mucinex  Was feeling better.  About 4 days ago, started Coughing more- dry cough.  Associated it as starting again after she stopped using the albuterol prescribed there  Feels slightly short of breath/ mild wheezing.  Has had repeated problems with similar symptoms since having covid December of 2021 (about 4 times so far).  No fevers with current illness    Problem List:     Patient Active Problem List   Diagnosis    Hypothyroidism    Menopausal disorder    Osteopenia    Hyperlipidemia    Diverticulosis    History of basal cell carcinoma (BCC) of skin    Overweight (BMI 25.0-29.9)    Need for prophylactic hormone replacement therapy (postmenopausal)         Medications:       Current Outpatient Medications:     aspirin (ECOTRIN) 81 MG EC tablet, Take 81 mg by mouth once daily., Disp: , Rfl:     calcium carbonate (OS-SUHAIL) 600 mg calcium (1,500 mg) Tab, Take 1,200 mg by mouth once daily., Disp: , Rfl:     cholecalciferol, vitamin D3, (VITAMIN D3) 25 mcg (1,000 unit) capsule, Take 1,000 Units by mouth once daily., Disp: , Rfl:     fexofenadine (ALLEGRA) 60 MG tablet, Take 60 mg by mouth as needed., Disp: , Rfl:     ibuprofen (ADVIL,MOTRIN) 600 MG tablet, Take 1 tablet (600 mg total) by mouth every 6 (six) hours as needed for Pain. (Patient taking differently: Take 600 mg by mouth every 6 (six) hours as needed for Pain.), Disp: 30 tablet, Rfl: 1    loratadine (CLARITIN) 10 mg tablet, Take 10 mg by mouth daily as needed., Disp: , Rfl:     methocarbamoL (ROBAXIN) 500 MG Tab, 1-2 tablets nightly for back spasm, Disp: 30 tablet,  "Rfl: 0    mirabegron (MYRBETRIQ) 25 mg Tb24 ER tablet, Take 1 tablet (25 mg total) by mouth once daily., Disp: 90 tablet, Rfl: 1    multivit,iron,minerals/lutein (CENTRUM SILVER ULTRA WOMEN'S ORAL), Take 1 tablet by mouth once daily., Disp: , Rfl:     rosuvastatin (CRESTOR) 10 MG tablet, Take 1 tablet (10 mg total) by mouth once daily., Disp: 90 tablet, Rfl: 3    SYNTHROID 75 mcg tablet, TAKE 1 TABLET EVERY DAY, Disp: 90 tablet, Rfl: 1    vitC/E/Zn/copper/lutein/zeaxan (ICAPS AREDS2 ORAL), Take 2 capsules by mouth once daily., Disp: , Rfl:     albuterol (PROVENTIL/VENTOLIN HFA) 90 mcg/actuation inhaler, Inhale into the lungs., Disp: , Rfl:     estradioL (ESTRACE) 0.5 MG tablet, Take 1 tablet (0.5 mg total) by mouth once daily., Disp: 90 tablet, Rfl: 3    fluticasone-salmeterol diskus inhaler 250-50 mcg, Inhale 1 puff into the lungs 2 (two) times daily. Controller, Disp: 60 each, Rfl: 1    furosemide (LASIX) 40 MG tablet, Take 1 tablet (40 mg total) by mouth daily as needed (swelling)., Disp: 90 tablet, Rfl: 2    methylPREDNISolone (MEDROL DOSEPACK) 4 mg tablet, use as directed, Disp: 21 each, Rfl: 0        Review of Systems:     Review of Systems   Constitutional:  Negative for chills and fever.   HENT:  Negative for ear pain and sore throat.    Respiratory:  Positive for cough and shortness of breath. Negative for wheezing.    Cardiovascular:  Negative for chest pain and palpitations.   Gastrointestinal:  Negative for constipation, diarrhea, nausea and vomiting.   Genitourinary:  Negative for dysuria and hematuria.   Musculoskeletal:  Negative for joint swelling and joint deformity.   Neurological:  Negative for dizziness and weakness.         Physical Exam:     Temp:    97.8 °F (36.6 °C)        Blood Pressure:  105/70        Pulse:   63     Respirations:  18  Weight:   69.1 kg (152 lb 5.4 oz)  Height:   5' 4" (1.626 m)  BMI:     Body mass index is 26.15 kg/m².    Physical Exam  Constitutional:       General: She " is not in acute distress.  HENT:      Right Ear: Tympanic membrane normal.      Left Ear: Tympanic membrane normal.      Nose: No congestion or rhinorrhea.      Mouth/Throat:      Pharynx: No oropharyngeal exudate or posterior oropharyngeal erythema.   Cardiovascular:      Rate and Rhythm: Normal rate and regular rhythm.   Pulmonary:      Effort: Pulmonary effort is normal. No respiratory distress.      Breath sounds: No wheezing.   Skin:     General: Skin is warm.   Neurological:      General: No focal deficit present.      Mental Status: She is alert and oriented to person, place, and time.         Assessment and Plan:     1. Chronic cough  - Complete PFT w/ bronchodilator; Future    2. Cough, unspecified type  - X-Ray Chest PA And Lateral; Future  - Complete PFT w/ bronchodilator; Future     Will get CXR and PFTs  Try a medrol dosepack, and then start advair bid.  If this is not helpful, could consider pulmonology eval.       Jaya Wing MD  5/29/2023    This note was prepared using voice-recognition software.  Although efforts are made to proofread the note, some errors may persist in the final document.

## 2023-06-01 ENCOUNTER — TELEPHONE (OUTPATIENT)
Dept: PRIMARY CARE CLINIC | Facility: CLINIC | Age: 78
End: 2023-06-01
Payer: MEDICARE

## 2023-06-01 ENCOUNTER — PATIENT MESSAGE (OUTPATIENT)
Dept: PRIMARY CARE CLINIC | Facility: CLINIC | Age: 78
End: 2023-06-01
Payer: MEDICARE

## 2023-06-01 RX ORDER — LEVOFLOXACIN 500 MG/1
500 TABLET, FILM COATED ORAL DAILY
Qty: 7 TABLET | Refills: 0 | Status: SHIPPED | OUTPATIENT
Start: 2023-06-01 | End: 2023-12-06 | Stop reason: ALTCHOICE

## 2023-06-01 NOTE — TELEPHONE ENCOUNTER
----- Message from Jaya Wing MD sent at 5/31/2023  6:26 PM CDT -----  Yoru xray showed an area in LLL that could be atelectasis (a area of lung that is not fully inflated) - there might be a little bit of pneumonia in that area too though?  Are you having any improvement in the steroids?  If not, we may want to consider a longer course of anbitioics.

## 2023-06-01 NOTE — TELEPHONE ENCOUNTER
Pt states she is not better. Scheduled an appt to follow up and possible repeat imaging with you on Monday. Did you want to add any meds in the mean time?

## 2023-06-01 NOTE — TELEPHONE ENCOUNTER
----- Message from Sadi Carlos sent at 5/31/2023 12:03 PM CDT -----  Contact: self [phone]  Pt requesting a call for an appt within the next week.    Please call and advise

## 2023-06-04 NOTE — PROGRESS NOTES
[facility] Progress Note  6/5/2023          Reason for Visit:      had concerns including Pneumonia.       History of Present Illness:     Seen 5/29 here  Had Bronchitis - started after mothers day  Seen in urgent care in [place], [place] while on vacation at [facility]  Wheezing, fatigue, cough (non-productive)  Tx with azithromycin, cough medication, albuterol, mucinex  Was feeling better.    But then last week of may, started Coughing more- dry cough.  Associated it as starting again after she stopped using the albuterol prescribed there  Feels slightly short of breath/ mild wheezing.  Has had repeated problems with similar symptoms since having covid December of 2021 (about 4 times so far).  No fevers with current illness    CXR 5/29/23  The left lower lobe has minimal, patchy opacity which mostly looks like plate atelectasis.  Small amount of consolidation such as pneumonia or aspiration is also suggested.  Lungs are clear elsewhere.  No pleural fluid identified.  Skeletal structures are intact.    We started steroids at the visit, and after the cxr came back, we also prescribed levofloxacin.    She is on day 4 of that so far, and hadn't noticed much imrpovement, though today she isnt' coughing as much, but does feel more tired.  Hasn't had fevers.    We ordered PFTs which were done after last visit and look ok other than mildly reduced DLCO.      Problem List:     Patient Active Problem List   Diagnosis    Hypothyroidism    Menopausal disorder    Osteopenia    Hyperlipidemia    Diverticulosis    History of basal cell carcinoma (BCC) of skin    Overweight (BMI 25.0-29.9)    Need for prophylactic hormone replacement therapy (postmenopausal)    Cough         Medications:       Current Outpatient Medications:     albuterol (PROVENTIL/VENTOLIN HFA) 90 mcg/actuation inhaler, Inhale into the lungs., Disp: , Rfl:     aspirin (ECOTRIN) 81 MG EC tablet, Take 81 mg by mouth once  daily., Disp: , Rfl:     calcium carbonate (OS-SUHAIL) 600 mg calcium (1,500 mg) Tab, Take 1,200 mg by mouth once daily., Disp: , Rfl:     cholecalciferol, vitamin D3, (VITAMIN D3) 25 mcg (1,000 unit) capsule, Take 1,000 Units by mouth once daily., Disp: , Rfl:     fexofenadine (ALLEGRA) 60 MG tablet, Take 60 mg by mouth as needed., Disp: , Rfl:     fluticasone-salmeterol diskus inhaler 250-50 mcg, Inhale 1 puff into the lungs 2 (two) times daily. Controller, Disp: 60 each, Rfl: 1    ibuprofen (ADVIL,MOTRIN) 600 MG tablet, Take 1 tablet (600 mg total) by mouth every 6 (six) hours as needed for Pain. (Patient taking differently: Take 600 mg by mouth every 6 (six) hours as needed for Pain.), Disp: 30 tablet, Rfl: 1    levoFLOXacin (LEVAQUIN) 500 MG tablet, Take 1 tablet (500 mg total) by mouth once daily., Disp: 7 tablet, Rfl: 0    loratadine (CLARITIN) 10 mg tablet, Take 10 mg by mouth daily as needed., Disp: , Rfl:     methocarbamoL (ROBAXIN) 500 MG Tab, 1-2 tablets nightly for back spasm, Disp: 30 tablet, Rfl: 0    methylPREDNISolone (MEDROL DOSEPACK) 4 mg tablet, use as directed, Disp: 21 each, Rfl: 0    mirabegron (MYRBETRIQ) 25 mg Tb24 ER tablet, Take 1 tablet (25 mg total) by mouth once daily., Disp: 90 tablet, Rfl: 1    multivit,iron,minerals/lutein (CENTRUM SILVER ULTRA WOMEN'S ORAL), Take 1 tablet by mouth once daily., Disp: , Rfl:     rosuvastatin (CRESTOR) 10 MG tablet, Take 1 tablet (10 mg total) by mouth once daily., Disp: 90 tablet, Rfl: 3    SYNTHROID 75 mcg tablet, TAKE 1 TABLET EVERY DAY, Disp: 90 tablet, Rfl: 1    vitC/E/Zn/copper/lutein/zeaxan (ICAPS AREDS2 ORAL), Take 2 capsules by mouth once daily., Disp: , Rfl:     estradioL (ESTRACE) 0.5 MG tablet, Take 1 tablet (0.5 mg total) by mouth once daily., Disp: 90 tablet, Rfl: 3    furosemide (LASIX) 40 MG tablet, Take 1 tablet (40 mg total) by mouth daily as needed (swelling)., Disp: 90 tablet, Rfl: 2        Review of Systems:     Review of Systems  "  Constitutional:  Negative for chills and fever.   HENT:  Negative for ear pain and sore throat.    Respiratory:  Positive for cough. Negative for shortness of breath and wheezing.    Cardiovascular:  Negative for chest pain and palpitations.   Gastrointestinal:  Negative for constipation, diarrhea, nausea and vomiting.   Genitourinary:  Negative for dysuria and hematuria.   Musculoskeletal:  Negative for joint swelling and joint deformity.   Neurological:  Negative for dizziness and weakness.         Physical Exam:     Temp:    97.8 °F (36.6 °C)        Blood Pressure:  112/64        Pulse:   62     Respirations:  18  Weight:   67.6 kg (149 lb 0.5 oz)  Height:   5' 4" (1.626 m)  BMI:     Body mass index is 25.58 kg/m².    Physical Exam  Constitutional:       General: She is not in acute distress.  Cardiovascular:      Rate and Rhythm: Normal rate and regular rhythm.   Pulmonary:      Effort: Pulmonary effort is normal. No respiratory distress.      Breath sounds: No wheezing.      Comments: Lungs sound clear, no wheezing on exam.  No increased tactile fremity  No egophany.  Abdominal:      Palpations: Abdomen is soft.      Tenderness: There is no abdominal tenderness.   Skin:     General: Skin is warm.   Neurological:      General: No focal deficit present.      Mental Status: She is alert and oriented to person, place, and time.           Assessment and Plan:     1. Abnormal CXR  2. Pneumonia of left lower lobe due to infectious organism  Repeat Chest xray next week after completing antibiotics later this week.  Unsure if the decreased cough today will be a trend.  If not improving and/or CXR doesn't show improvement, consider pulmonology referral  - X-Ray Chest PA And Lateral; Future    3. Aortic atherosclerosis  Seen on XR.  Continue crestor         Jaya Wing MD  6/5/2023    This note was prepared using voice-recognition software.  Although efforts are made to proofread the note, some errors may persist " in the final document.

## 2023-06-05 ENCOUNTER — OFFICE VISIT (OUTPATIENT)
Dept: PRIMARY CARE CLINIC | Facility: CLINIC | Age: 78
End: 2023-06-05
Payer: MEDICARE

## 2023-06-05 VITALS
BODY MASS INDEX: 25.45 KG/M2 | TEMPERATURE: 98 F | OXYGEN SATURATION: 96 % | WEIGHT: 149.06 LBS | HEIGHT: 64 IN | DIASTOLIC BLOOD PRESSURE: 64 MMHG | SYSTOLIC BLOOD PRESSURE: 112 MMHG | RESPIRATION RATE: 18 BRPM | HEART RATE: 62 BPM

## 2023-06-05 DIAGNOSIS — J18.9 PNEUMONIA OF LEFT LOWER LOBE DUE TO INFECTIOUS ORGANISM: ICD-10-CM

## 2023-06-05 DIAGNOSIS — R93.89 ABNORMAL CXR: Primary | ICD-10-CM

## 2023-06-05 DIAGNOSIS — I70.0 AORTIC ATHEROSCLEROSIS: ICD-10-CM

## 2023-06-05 PROCEDURE — 99999 PR PBB SHADOW E&M-EST. PATIENT-LVL V: ICD-10-PCS | Mod: PBBFAC,,, | Performed by: INTERNAL MEDICINE

## 2023-06-05 PROCEDURE — 3078F PR MOST RECENT DIASTOLIC BLOOD PRESSURE < 80 MM HG: ICD-10-PCS | Mod: CPTII,S$GLB,, | Performed by: INTERNAL MEDICINE

## 2023-06-05 PROCEDURE — 99214 OFFICE O/P EST MOD 30 MIN: CPT | Mod: S$GLB,,, | Performed by: INTERNAL MEDICINE

## 2023-06-05 PROCEDURE — 3288F FALL RISK ASSESSMENT DOCD: CPT | Mod: CPTII,S$GLB,, | Performed by: INTERNAL MEDICINE

## 2023-06-05 PROCEDURE — 3288F PR FALLS RISK ASSESSMENT DOCUMENTED: ICD-10-PCS | Mod: CPTII,S$GLB,, | Performed by: INTERNAL MEDICINE

## 2023-06-05 PROCEDURE — 99214 PR OFFICE/OUTPT VISIT, EST, LEVL IV, 30-39 MIN: ICD-10-PCS | Mod: S$GLB,,, | Performed by: INTERNAL MEDICINE

## 2023-06-05 PROCEDURE — 1101F PR PT FALLS ASSESS DOC 0-1 FALLS W/OUT INJ PAST YR: ICD-10-PCS | Mod: CPTII,S$GLB,, | Performed by: INTERNAL MEDICINE

## 2023-06-05 PROCEDURE — 3078F DIAST BP <80 MM HG: CPT | Mod: CPTII,S$GLB,, | Performed by: INTERNAL MEDICINE

## 2023-06-05 PROCEDURE — 1126F AMNT PAIN NOTED NONE PRSNT: CPT | Mod: CPTII,S$GLB,, | Performed by: INTERNAL MEDICINE

## 2023-06-05 PROCEDURE — 99999 PR PBB SHADOW E&M-EST. PATIENT-LVL V: CPT | Mod: PBBFAC,,, | Performed by: INTERNAL MEDICINE

## 2023-06-05 PROCEDURE — 1126F PR PAIN SEVERITY QUANTIFIED, NO PAIN PRESENT: ICD-10-PCS | Mod: CPTII,S$GLB,, | Performed by: INTERNAL MEDICINE

## 2023-06-05 PROCEDURE — 1159F PR MEDICATION LIST DOCUMENTED IN MEDICAL RECORD: ICD-10-PCS | Mod: CPTII,S$GLB,, | Performed by: INTERNAL MEDICINE

## 2023-06-05 PROCEDURE — 1101F PT FALLS ASSESS-DOCD LE1/YR: CPT | Mod: CPTII,S$GLB,, | Performed by: INTERNAL MEDICINE

## 2023-06-05 PROCEDURE — 3074F SYST BP LT 130 MM HG: CPT | Mod: CPTII,S$GLB,, | Performed by: INTERNAL MEDICINE

## 2023-06-05 PROCEDURE — 1159F MED LIST DOCD IN RCRD: CPT | Mod: CPTII,S$GLB,, | Performed by: INTERNAL MEDICINE

## 2023-06-05 PROCEDURE — 3074F PR MOST RECENT SYSTOLIC BLOOD PRESSURE < 130 MM HG: ICD-10-PCS | Mod: CPTII,S$GLB,, | Performed by: INTERNAL MEDICINE

## 2023-06-13 ENCOUNTER — HOSPITAL ENCOUNTER (OUTPATIENT)
Dept: RADIOLOGY | Facility: HOSPITAL | Age: 78
Discharge: HOME | End: 2023-06-13
Attending: INTERNAL MEDICINE
Payer: MEDICARE

## 2023-06-13 DIAGNOSIS — R93.89 ABNORMAL CXR: ICD-10-CM

## 2023-06-13 PROCEDURE — 71046 X-RAY EXAM CHEST 2 VIEWS: CPT | Mod: 26,,, | Performed by: RADIOLOGY

## 2023-06-13 PROCEDURE — 71046 XR CHEST PA AND LATERAL: ICD-10-PCS | Mod: 26,,, | Performed by: RADIOLOGY

## 2023-06-13 PROCEDURE — 71046 X-RAY EXAM CHEST 2 VIEWS: CPT | Mod: TC,PO

## 2023-08-21 DIAGNOSIS — E78.5 HYPERLIPIDEMIA, UNSPECIFIED HYPERLIPIDEMIA TYPE: ICD-10-CM

## 2023-08-21 NOTE — TELEPHONE ENCOUNTER
No care due was identified.  [facility] Embedded Care Due Messages. Reference number: [id number].   8/21/2023 2:52:21 PM CDT

## 2023-08-21 NOTE — OP NOTE
Susan Ware (:  1949) is a 68 y.o. female,Established patient, here for evaluation of the following chief complaint(s):  1 Month Follow-Up and Agitation         ASSESSMENT/PLAN:  1. Cerebral atrophy [facility])  -     Jasper Bolanos MD, Neurology, [facility]  2. Memory loss  3. Agitation-some improvement with  5 mg dose will inc to 10 mg bid  Appt in 3mo  -     memantine (NAMENDA) 10 MG tablet; Take 1 tablet by mouth 2 times daily, Disp-60 tablet, R-5Normal  4. Type 2 diabetes mellitus with hyperglycemia, without long-term current use of insulin (HCC)-not controlled discussed risks and benefits add farxiga  -     dapagliflozin (FARXIGA) 5 MG tablet; Take 1 tablet by mouth every morning, Disp-30 tablet, R-5Normal  5. Malignant melanoma of torso excluding breast (HCC)      No follow-ups on file. Subjective   SUBJECTIVE/OBJECTIVE:  HPI  2-week follow-up for memory loss agitation we started Namenda 5 mg twice daily and her  Leonarda Pantoja notes that she does seem less agitated. MRI unfortunately showed cerebral atrophy. Discussed this in detail today with the patient and her . I do not think we have a way to reverse the findings. I do recommend neurology consult and working to titrate up on the Namenda to help with the agitation. Melanoma recently resected from torso by Dr. Sandie Ferrera hemoglobin A1c with him was 9% she is currently on metformin and Januvia we will add low-dose Keren. Hypertension well-controlled on olmesartan HCTZ and amlodipine. Review of Systems       Objective   Physical Exam  Constitutional:       Appearance: Normal appearance. HENT:      Head: Normocephalic and atraumatic. Cardiovascular:      Rate and Rhythm: Normal rate and regular rhythm. Pulmonary:      Effort: Pulmonary effort is normal.      Breath sounds: Normal breath sounds. Musculoskeletal:      Right lower leg: No edema. Left lower leg: No edema.    Skin:     Comments: Bandage on THIS IS A CONFIDENTIAL AND PRIVILEGED COMMUNICATION PLEASE  DISPOSE OF PAPER COPIES APPROPRIATELY  [facility] [place], [place]  OPERATIVE REPORT          PATIENT NAME: Sury Gao    MEDICAL RECORD NUMBER:  [id number]    PROCEDURE DATE:  04/09/2018    PROCEDURE:   1. Laparoscopic Sacrocolpopexy  2. Cystourethroscopy      ATTENDING SURGEON:  Maxx Castellanos DO     ASSISTANT:   Estefanía Bocanegra PGY- 3    PREOPERATIVE DIAGNOSIS:   1. Stage 3 apical prolapse  2. Stage 2 anterior vaginal wall prolapse    POSTOPERATIVE DIAGNOSIS:     1. Stage 3 apical prolapse  2. Stage 2 anterior vaginal wall prolapse    ANESTHESIA: GET    WOUND CLASSIFICATION: Clean-Contaminated    ANTIBIOTIC PROPHYLAXIS: Kefzol    FINDINGS:  Small non expanding hematoma, noted on the right side lateral to the cuff.  Normal cystourethroscopy, efflux of blue urine from both ureteral orifices. Normal cysto, normal mucosa without evidence of suture, foreign body, stone or diverticulum.     SPECIMENS SENT:  None     COMPLICATIONS: None     DRAINS: Patel    ESTIMATED BLOOD LOSS:  75cc    IV FLUIDS:  2L    POSTOP CONDITION: stable    PROCEDURE:    The patient was identified in the preoperative area where informed consent was confirmed, and she was taken to the operating room where an adequate level of general anesthesia was obtained.  The patient was positioned by Dr. Zavala.  When I walked into the room the uterus and cervix was amputated. I confirmed that Intravenous antibiotics were administered preoperatively and that sequential compression devices were applied to the patient's lower extremities for VTE prophylaxis.  Surgical time-out was performed, where the patient was identified and procedures confirmed.      The uterus was removed by Dr. Zavala and she asked me to close the cuff.  There were three trocars in place (two 5 ports and one 10 umbilical port).  I removed the 5 mm port on the right and  placed a 8 mm trocar under direct visualization. We used the 0 V-loc to close the cuff in a running fashion.  The EA sizer was then placed in the vagina to better evaluated the bladder and to dissect it off the vagina. Then an incision was made in the peritoneum posterior aspect of the vagina in the midline and the peritoneum was dissected laterally creating peritoneal flaps to cover the sacrocolpopexy mesh.  Attention was then turned to the sacral promontory, which was identified by visualization and palpation. Again, the course of the right ureter as well as the location of the sigmoid colon was identified. The peritoneum overlying the sacral promontory was then elevated and incised using the laparoscopic scissors. Gentle blunt dissection was then undertaken to reveal presacral fascia again with hemostasis noted. A peritoneal tunnel was then made from the sacral promontory along the right pelvic sidewall and right uterosacral ligament, up to the incision that had been previously made on the posterior aspect of the vagina. The mesh was then trimmed and introduced into the abdomen.     The anterior and posterior arms of the mesh were attached to the anterior and posterior aspects of the vagina  with 4 anteriorly  and 4 posteriorly  0 Vicryl sutures.  The long arm of the mesh was then brought through the peritoneal tunnel that had been created, and the mesh was tensioned appropriately and attached to the sacral promontory with the protact titanium Tacker X 3.  Excess mesh was again trimmed. Surgiflow was placed along the length of the tunnel.  The peritoneum overlying the sacral promontory was re-approximated with 0 Vicryl in a pursestring fashion. The anterior and posterior leafs of the peritoneum in the pelvis were re-approximated using 0 Vicryl in a pursestring fashion as well, essentially covering the entire mesh. The abdomen and pelvis was again evaluated and noted to have excellent hemostatis. The small  hematoma which was note previously lateral to the right cuff  was re-evaluated and was not expanding.     Cystoscopy was then undertaken, which revealed bilateral ureteral jets; bladder mucosa intact, no injuries, no stones or diverticulum and normal urethra.  With the bladder full we applied suprapubic pressrue and did not see leakage of urine. The Patel catheter was reinserted. We changed our gloves and turned our attention to the abdomen.  Again, close inspection of the pelvis revealed excellent hemostasis. We placed Interceed along the denuded areas not covered with peritoneum.  All instruments were then removed from the abdomen and the pelvis and the gas turned off all the trocars were removed. The fascia of the umbilical incision was reapproximated with 0 Vicryl in Figure of Eight fashion. The remainder of the skin incisions were reapproximated with Steri-strips, 2x2s, and Tegaderm. There was not need for an anterior or posterior repair as the vagina was well suspended.   The vagina was packed with gauze.     The patient tolerated the procedure well. Needle, sponge lap, and instrument counts were correct x2. The patient was transferred to recovery in stable condition.    Dr. Maxx Castellanos was present and participated in the entire procedure.    DICTATED BY Maxx Castellanos, DO    _________________________

## 2023-08-22 RX ORDER — ROSUVASTATIN CALCIUM 10 MG/1
10 TABLET, COATED ORAL DAILY
Qty: 90 TABLET | Refills: 0 | Status: SHIPPED | OUTPATIENT
Start: 2023-08-22 | End: 2023-12-06 | Stop reason: SDUPTHER

## 2023-08-22 NOTE — TELEPHONE ENCOUNTER
Refill Decision Note   Sury Gao  is requesting a refill authorization.  Brief Assessment and Rationale for Refill:  Approve     Medication Therapy Plan:         Comments:     Note composed:10:09 AM 08/22/2023

## 2023-09-08 ENCOUNTER — TELEPHONE (OUTPATIENT)
Dept: PRIMARY CARE CLINIC | Facility: CLINIC | Age: 78
End: 2023-09-08

## 2023-10-16 DIAGNOSIS — R39.15 URGENCY OF URINATION: ICD-10-CM

## 2023-10-16 RX ORDER — MIRABEGRON 25 MG/1
25 TABLET, FILM COATED, EXTENDED RELEASE ORAL
Qty: 90 TABLET | Refills: 1 | Status: SHIPPED | OUTPATIENT
Start: 2023-10-16

## 2023-10-16 NOTE — TELEPHONE ENCOUNTER
Refill Decision Note   Sury Gao  is requesting a refill authorization.  Brief Assessment and Rationale for Refill:  Approve     Medication Therapy Plan:         Comments:     Note composed:5:48 PM 10/16/2023

## 2023-10-23 DIAGNOSIS — E03.9 ACQUIRED HYPOTHYROIDISM: ICD-10-CM

## 2023-10-23 NOTE — TELEPHONE ENCOUNTER
Care Due:                  Date            Visit Type   Department     Provider  --------------------------------------------------------------------------------                                EP -                              PRIMARY      [facility]  Last Visit: 06-      CARE ([facility])   Care           Jaya Wing                              EP -                              PRIMARY      [facility]  Next Visit: 12-      CARE ([facility])   Care           Anne Quispe                                                            Last  Test          Frequency    Reason                     Performed    Due Date  --------------------------------------------------------------------------------    CMP.........  12 months..  rosuvastatin.............  01- 01-    Lipid Panel.  12 months..  rosuvastatin.............  01- 01-    [facility] Embedded Care Due Messages. Reference number: [id number].   10/23/2023 4:59:20 PM CDT

## 2023-10-24 RX ORDER — LEVOTHYROXINE SODIUM 75 UG/1
TABLET ORAL
Qty: 90 TABLET | Refills: 0 | Status: SHIPPED | OUTPATIENT
Start: 2023-10-24 | End: 2023-12-06 | Stop reason: SDUPTHER

## 2023-10-24 NOTE — TELEPHONE ENCOUNTER
Provider Staff:  Action required for this patient     Please see care gap opportunities below in Care Due Message.    Thanks!  [facility]     Appointments      Date Provider   Last Visit   9/1/2022 Anne Quispe MD   Next Visit   12/6/2023 Anne Quispe MD     Refill Decision Note   Sury Gao  is requesting a refill authorization.  Brief Assessment and Rationale for Refill:  Approve     Medication Therapy Plan:         Comments:     Note composed:3:57 PM 10/24/2023             Appointments     Last Visit   9/1/2022 Anne Quispe MD   Next Visit   12/6/2023 Anne Quispe MD

## 2023-12-06 ENCOUNTER — OFFICE VISIT (OUTPATIENT)
Dept: PRIMARY CARE CLINIC | Facility: CLINIC | Age: 78
End: 2023-12-06
Payer: MEDICARE

## 2023-12-06 VITALS
HEART RATE: 66 BPM | BODY MASS INDEX: 24.77 KG/M2 | RESPIRATION RATE: 18 BRPM | TEMPERATURE: 98 F | DIASTOLIC BLOOD PRESSURE: 59 MMHG | OXYGEN SATURATION: 96 % | WEIGHT: 145.06 LBS | HEIGHT: 64 IN | SYSTOLIC BLOOD PRESSURE: 104 MMHG

## 2023-12-06 DIAGNOSIS — Z79.899 OTHER LONG TERM (CURRENT) DRUG THERAPY: ICD-10-CM

## 2023-12-06 DIAGNOSIS — Z23 NEED FOR VACCINATION: ICD-10-CM

## 2023-12-06 DIAGNOSIS — E78.5 HYPERLIPIDEMIA, UNSPECIFIED HYPERLIPIDEMIA TYPE: Primary | ICD-10-CM

## 2023-12-06 DIAGNOSIS — E03.9 ACQUIRED HYPOTHYROIDISM: ICD-10-CM

## 2023-12-06 DIAGNOSIS — Z85.828 HISTORY OF BASAL CELL CARCINOMA (BCC) OF SKIN: ICD-10-CM

## 2023-12-06 DIAGNOSIS — Z13.820 OSTEOPOROSIS SCREENING: ICD-10-CM

## 2023-12-06 DIAGNOSIS — M81.0 OSTEOPOROSIS, UNSPECIFIED OSTEOPOROSIS TYPE, UNSPECIFIED PATHOLOGICAL FRACTURE PRESENCE: ICD-10-CM

## 2023-12-06 DIAGNOSIS — K57.90 DIVERTICULOSIS: ICD-10-CM

## 2023-12-06 PROCEDURE — 3288F FALL RISK ASSESSMENT DOCD: CPT | Mod: HCNC,CPTII,S$GLB, | Performed by: FAMILY MEDICINE

## 2023-12-06 PROCEDURE — 3288F PR FALLS RISK ASSESSMENT DOCUMENTED: ICD-10-PCS | Mod: HCNC,CPTII,S$GLB, | Performed by: FAMILY MEDICINE

## 2023-12-06 PROCEDURE — 1159F MED LIST DOCD IN RCRD: CPT | Mod: HCNC,CPTII,S$GLB, | Performed by: FAMILY MEDICINE

## 2023-12-06 PROCEDURE — 1126F AMNT PAIN NOTED NONE PRSNT: CPT | Mod: HCNC,CPTII,S$GLB, | Performed by: FAMILY MEDICINE

## 2023-12-06 PROCEDURE — 1101F PT FALLS ASSESS-DOCD LE1/YR: CPT | Mod: HCNC,CPTII,S$GLB, | Performed by: FAMILY MEDICINE

## 2023-12-06 PROCEDURE — 90694 VACC AIIV4 NO PRSRV 0.5ML IM: CPT | Mod: HCNC,S$GLB,, | Performed by: FAMILY MEDICINE

## 2023-12-06 PROCEDURE — 99999 PR PBB SHADOW E&M-EST. PATIENT-LVL IV: ICD-10-PCS | Mod: PBBFAC,HCNC,, | Performed by: FAMILY MEDICINE

## 2023-12-06 PROCEDURE — 90694 FLU VACCINE - QUADRIVALENT - ADJUVANTED: ICD-10-PCS | Mod: HCNC,S$GLB,, | Performed by: FAMILY MEDICINE

## 2023-12-06 PROCEDURE — 3078F DIAST BP <80 MM HG: CPT | Mod: HCNC,CPTII,S$GLB, | Performed by: FAMILY MEDICINE

## 2023-12-06 PROCEDURE — 3078F PR MOST RECENT DIASTOLIC BLOOD PRESSURE < 80 MM HG: ICD-10-PCS | Mod: HCNC,CPTII,S$GLB, | Performed by: FAMILY MEDICINE

## 2023-12-06 PROCEDURE — 3074F SYST BP LT 130 MM HG: CPT | Mod: HCNC,CPTII,S$GLB, | Performed by: FAMILY MEDICINE

## 2023-12-06 PROCEDURE — 1126F PR PAIN SEVERITY QUANTIFIED, NO PAIN PRESENT: ICD-10-PCS | Mod: HCNC,CPTII,S$GLB, | Performed by: FAMILY MEDICINE

## 2023-12-06 PROCEDURE — 99214 PR OFFICE/OUTPT VISIT, EST, LEVL IV, 30-39 MIN: ICD-10-PCS | Mod: 25,HCNC,S$GLB, | Performed by: FAMILY MEDICINE

## 2023-12-06 PROCEDURE — G0008 ADMIN INFLUENZA VIRUS VAC: HCPCS | Mod: HCNC,S$GLB,, | Performed by: FAMILY MEDICINE

## 2023-12-06 PROCEDURE — 3074F PR MOST RECENT SYSTOLIC BLOOD PRESSURE < 130 MM HG: ICD-10-PCS | Mod: HCNC,CPTII,S$GLB, | Performed by: FAMILY MEDICINE

## 2023-12-06 PROCEDURE — 99999 PR PBB SHADOW E&M-EST. PATIENT-LVL IV: CPT | Mod: PBBFAC,HCNC,, | Performed by: FAMILY MEDICINE

## 2023-12-06 PROCEDURE — 1101F PR PT FALLS ASSESS DOC 0-1 FALLS W/OUT INJ PAST YR: ICD-10-PCS | Mod: HCNC,CPTII,S$GLB, | Performed by: FAMILY MEDICINE

## 2023-12-06 PROCEDURE — 1159F PR MEDICATION LIST DOCUMENTED IN MEDICAL RECORD: ICD-10-PCS | Mod: HCNC,CPTII,S$GLB, | Performed by: FAMILY MEDICINE

## 2023-12-06 PROCEDURE — G0008 FLU VACCINE - QUADRIVALENT - ADJUVANTED: ICD-10-PCS | Mod: HCNC,S$GLB,, | Performed by: FAMILY MEDICINE

## 2023-12-06 PROCEDURE — 99214 OFFICE O/P EST MOD 30 MIN: CPT | Mod: 25,HCNC,S$GLB, | Performed by: FAMILY MEDICINE

## 2023-12-06 RX ORDER — ROSUVASTATIN CALCIUM 10 MG/1
10 TABLET, COATED ORAL DAILY
Qty: 90 TABLET | Refills: 3 | Status: SHIPPED | OUTPATIENT
Start: 2023-12-06

## 2023-12-06 RX ORDER — LEVOTHYROXINE SODIUM 75 UG/1
75 TABLET ORAL DAILY
Qty: 90 TABLET | Refills: 3 | Status: SHIPPED | OUTPATIENT
Start: 2023-12-06

## 2023-12-06 NOTE — PROGRESS NOTES
"Subjective:       Patient ID: Sury Gao is a 78 y.o. female.    Chief Complaint: Annual Exam    HPI   79 y/o female former smoker (quit in the 70's) with hyperlipidemia, aortic atherosclerosis, hypothyroidism, history of colon polyps, diverticulosis, osteopenia, history of basal cell carcinoma of skin, questionable history of TIA, hx of Covid is here for follow up.      She is feeling good, walking 4 days a week for about 30 min, weight down 6 pounds.  She denies f/n/v/d/constipation/cp/sob/urinary sx. Sleeping ok, wakes up rested. Mood ok.     Hx of Covid 12/2021 mild outpatient  chronic R mid to lower back pain for about 10 years, no accident or injury, pain is constant but varies with intensity depending on what she is doing, worse with singing and vaccuming and sweeping, pain is a soreness usually 4/10, not radiating, no imaging done in the past or formal PT, using tylenol or ibuprofen 3 times a months  ?TIA: symptoms occurred post op hysterectomy 3 years ago, work up benign at , was seen by Dr. Escobar  HLPD/aortic atherosclerosis cxr 5/2023: calcium score 0 (done for HRT clearance), asa 81 mg daily, crestor 10 mg daily  Hypothyroidism: levothyroxine 75 mcg daily   Leg swelling: has lasix 20 mg for prn use  GYN: followed by Dr. Zavala, pelvic utd, estradiol, myrbetriq, mmg 5/2023 plans to do it every other year  Osteoporosis: dexa 2/2022, calcium and vit d daily  2/2018, colon polyps, diverticulosis repeat 5 years  Eye exam utd Dr. jeter  Dental utd  Hx of BCC: followed by Dr. Rupert Molina  OTC: Ibuprofen a few times a month, AREDs, MVI, Zinc     Review of Systems    Objective:      BP (!) 104/59 (BP Location: Left arm, Patient Position: Sitting, BP Method: Small (Manual))   Pulse 66   Temp 97.8 °F (36.6 °C) (Oral)   Resp 18   Ht 5' 4" (1.626 m)   Wt 65.8 kg (145 lb 1 oz)   SpO2 96%   BMI 24.90 kg/m²   Physical Exam  Vitals and nursing note reviewed.   Constitutional:       Appearance: " She is well-developed.   HENT:      Head: Normocephalic and atraumatic.      Right Ear: Tympanic membrane normal.      Left Ear: Tympanic membrane normal.      Mouth/Throat:      Pharynx: No oropharyngeal exudate or posterior oropharyngeal erythema.   Neck:      Thyroid: No thyromegaly.   Cardiovascular:      Rate and Rhythm: Normal rate and regular rhythm.      Heart sounds: Normal heart sounds.   Pulmonary:      Effort: Pulmonary effort is normal. No respiratory distress.      Breath sounds: Normal breath sounds.   Abdominal:      General: Bowel sounds are normal. There is no distension.      Palpations: Abdomen is soft. There is no mass.      Tenderness: There is no abdominal tenderness.   Musculoskeletal:      Cervical back: Normal range of motion and neck supple.      Right lower leg: No edema.      Left lower leg: No edema.   Lymphadenopathy:      Cervical: No cervical adenopathy.   Skin:     General: Skin is warm and dry.   Neurological:      Mental Status: She is alert.         Assessment:       1. Hyperlipidemia, unspecified hyperlipidemia type    2. Osteoporosis screening    3. Acquired hypothyroidism    4. Osteoporosis, unspecified osteoporosis type, unspecified pathological fracture presence    5. History of basal cell carcinoma (BCC) of skin    6. Diverticulosis    7. Other long term (current) drug therapy        Plan:   Sury was seen today for annual exam.    Diagnoses and all orders for this visit:    Hyperlipidemia, unspecified hyperlipidemia type  -     rosuvastatin (CRESTOR) 10 MG tablet; Take 1 tablet (10 mg total) by mouth once daily.  -     CBC Auto Differential; Future  -     Comprehensive Metabolic Panel; Future  -     Hemoglobin A1C; Future  -     Lipid Panel; Future    Osteoporosis screening  -     DXA Bone Density Axial Skeleton 1 or more sites; Future    Acquired hypothyroidism  -     SYNTHROID 75 mcg tablet; Take 1 tablet (75 mcg total) by mouth once daily.  -     TSH;  Future    Osteoporosis, unspecified osteoporosis type, unspecified pathological fracture presence  -     DXA Bone Density Axial Skeleton 1 or more sites; Future    History of basal cell carcinoma (BCC) of skin    Diverticulosis    Other long term (current) drug therapy  -     Hemoglobin A1C; Future

## 2023-12-19 ENCOUNTER — PATIENT MESSAGE (OUTPATIENT)
Dept: PRIMARY CARE CLINIC | Facility: CLINIC | Age: 78
End: 2023-12-19
Payer: MEDICARE

## 2023-12-19 ENCOUNTER — TELEPHONE (OUTPATIENT)
Dept: PRIMARY CARE CLINIC | Facility: CLINIC | Age: 78
End: 2023-12-19
Payer: MEDICARE

## 2023-12-19 NOTE — TELEPHONE ENCOUNTER
----- Message from Jazlyn Sauer sent at 12/19/2023  9:09 AM CST -----  Contact: Patient @ [phone]  [email or web address]     Patient is calling for Medical Advice regarding:CONGESTION IN THROAT/COVID PATIENT/EXPOSED ON FRIDAY AT A PARTY    How long has patient had these symptoms:TODAY AT HOME TEST    Pharmacy name and phone#:      [facility]/pharmacy #[id number] - [place] - [id number] [facility]  [place] [address]  Phone: [phone] Fax: [phone]        Would like response via [employer]:  CALL     Comments:

## 2023-12-19 NOTE — TELEPHONE ENCOUNTER
States that she tested positive for covid  Fever  Congestion in throat   Fatigue   Nimco will call pt for a sooner appointment

## 2023-12-20 ENCOUNTER — PATIENT MESSAGE (OUTPATIENT)
Dept: PRIMARY CARE CLINIC | Facility: CLINIC | Age: 78
End: 2023-12-20

## 2023-12-20 ENCOUNTER — OFFICE VISIT (OUTPATIENT)
Dept: PRIMARY CARE CLINIC | Facility: CLINIC | Age: 78
End: 2023-12-20
Payer: MEDICARE

## 2023-12-20 ENCOUNTER — TELEPHONE (OUTPATIENT)
Dept: PRIMARY CARE CLINIC | Facility: CLINIC | Age: 78
End: 2023-12-20

## 2023-12-20 VITALS — TEMPERATURE: 99 F | OXYGEN SATURATION: 96 %

## 2023-12-20 DIAGNOSIS — M81.0 OSTEOPOROSIS, UNSPECIFIED OSTEOPOROSIS TYPE, UNSPECIFIED PATHOLOGICAL FRACTURE PRESENCE: ICD-10-CM

## 2023-12-20 DIAGNOSIS — E78.5 HYPERLIPIDEMIA, UNSPECIFIED HYPERLIPIDEMIA TYPE: ICD-10-CM

## 2023-12-20 DIAGNOSIS — U07.1 COVID-19: Primary | ICD-10-CM

## 2023-12-20 DIAGNOSIS — Z87.01 HISTORY OF PNEUMONIA: ICD-10-CM

## 2023-12-20 PROCEDURE — 99214 OFFICE O/P EST MOD 30 MIN: CPT | Mod: HCNC,95,, | Performed by: FAMILY MEDICINE

## 2023-12-20 PROCEDURE — 1160F RVW MEDS BY RX/DR IN RCRD: CPT | Mod: HCNC,CPTII,95, | Performed by: FAMILY MEDICINE

## 2023-12-20 PROCEDURE — 1101F PR PT FALLS ASSESS DOC 0-1 FALLS W/OUT INJ PAST YR: ICD-10-PCS | Mod: HCNC,CPTII,95, | Performed by: FAMILY MEDICINE

## 2023-12-20 PROCEDURE — 1160F PR REVIEW ALL MEDS BY PRESCRIBER/CLIN PHARMACIST DOCUMENTED: ICD-10-PCS | Mod: HCNC,CPTII,95, | Performed by: FAMILY MEDICINE

## 2023-12-20 PROCEDURE — 3288F FALL RISK ASSESSMENT DOCD: CPT | Mod: HCNC,CPTII,95, | Performed by: FAMILY MEDICINE

## 2023-12-20 PROCEDURE — 99214 PR OFFICE/OUTPT VISIT, EST, LEVL IV, 30-39 MIN: ICD-10-PCS | Mod: HCNC,95,, | Performed by: FAMILY MEDICINE

## 2023-12-20 PROCEDURE — 1159F MED LIST DOCD IN RCRD: CPT | Mod: HCNC,CPTII,95, | Performed by: FAMILY MEDICINE

## 2023-12-20 PROCEDURE — 1159F PR MEDICATION LIST DOCUMENTED IN MEDICAL RECORD: ICD-10-PCS | Mod: HCNC,CPTII,95, | Performed by: FAMILY MEDICINE

## 2023-12-20 PROCEDURE — 1101F PT FALLS ASSESS-DOCD LE1/YR: CPT | Mod: HCNC,CPTII,95, | Performed by: FAMILY MEDICINE

## 2023-12-20 PROCEDURE — 3288F PR FALLS RISK ASSESSMENT DOCUMENTED: ICD-10-PCS | Mod: HCNC,CPTII,95, | Performed by: FAMILY MEDICINE

## 2023-12-20 NOTE — PROGRESS NOTES
Subjective:       Patient ID: Sury Gao is a 78 y.o. female.    Chief Complaint: covid (Pt ws diagnosed with covid on yesterday /Dealing with/ fever /Congestion in throat /Fatigue )        77 y/o female former smoker (quit in the 70's) with hyperlipidemia, aortic atherosclerosis, hypothyroidism, history of colon polyps, diverticulosis, osteopenia, history of basal cell carcinoma of skin, questionable history of TIA, hx of Covid is here for Covid +.    She was at a party Friday and was exposed to covid. She started feeling bad on Monday, took home covid test on Tuesday, she has increased fatigue, fever .0, body aches, headache, nasal congestion, pnd, cough sometimes productive yellow sputum, mild sob, sore throat on Monday, she denies sinus pressure/ear pain/n/v/d/constipation/cp/wheezing/urinary sx. Appetite decreased. Sleeping ok. She is taking Mucinex, Nasalcort, Tylenol which helps. She has albuterol at home but has not tried it. Hx of pneumonia in 6/2023 was last time she did a course of antibiotics, PFTs 5/2023 mildly reduced DLCO. Last pulmonary visit 2021 recommended prn follow up only.    Home pulse ox 96%      Hx of Covid 12/2021 mild outpatient  chronic R mid to lower back pain for about 10 years, no accident or injury, pain is constant but varies with intensity depending on what she is doing, worse with singing and vaccuming and sweeping, pain is a soreness usually 4/10, not radiating, no imaging done in the past or formal PT, using tylenol or ibuprofen 3 times a months  ?TIA: symptoms occurred post op hysterectomy 3 years ago, work up benign at , was seen by Dr. Escobar  HLPD/aortic atherosclerosis cxr 5/2023: calcium score 0 (done for HRT clearance), asa 81 mg daily, crestor 10 mg daily  Hypothyroidism: levothyroxine 75 mcg daily   Leg swelling: has lasix 20 mg for prn use  GYN: followed by Dr. Zavala, pelvic utd, estradiol, myrbetriq, mmg 5/2023 plans to do it every other  year  Osteoporosis: dexa 2/2022, calcium and vit d daily  2/2018, colon polyps, diverticulosis repeat 5 years  Eye exam utd Dr. jeter  Dental utd  Hx of BCC: followed by Dr. Rupert Molina  OTC: Ibuprofen a few times a month, AREDs, MVI, Zinc     Review of Systems   Constitutional:  Positive for chills and fever.   HENT:  Positive for postnasal drip, rhinorrhea and sore throat. Negative for ear pain.    Respiratory:  Positive for cough. Negative for shortness of breath and wheezing.    Cardiovascular:  Negative for chest pain.   Musculoskeletal:  Negative for myalgias.   Skin:  Negative for rash.   Allergic/Immunologic: Positive for environmental allergies.   Neurological:  Positive for headaches.     see HPI  Objective:      Temp 99 °F (37.2 °C) (Oral)   SpO2 96%   Physical Exam  Constitutional:       General: She is not in acute distress.     Appearance: She is well-developed. She is not diaphoretic.   HENT:      Head: Normocephalic and atraumatic.   Pulmonary:      Effort: No respiratory distress.   Neurological:      Mental Status: She is alert and oriented to person, place, and time.         Assessment:       1. COVID-19    2. Hyperlipidemia, unspecified hyperlipidemia type    3. Osteoporosis, unspecified osteoporosis type, unspecified pathological fracture presence    4. History of pneumonia        Plan:   Sury was seen today for covid.    Diagnoses and all orders for this visit:    COVID-19  -     molnupiravir 200 mg capsule (EUA); Take 4 capsules (800 mg total) by mouth every 12 (twelve) hours. for 5 days  -     X-Ray Chest PA And Lateral; Future    Hyperlipidemia, unspecified hyperlipidemia type    Osteoporosis, unspecified osteoporosis type, unspecified pathological fracture presence    History of pneumonia  -     X-Ray Chest PA And Lateral; Future      The patient location is: [place]  The chief complaint leading to consultation is: covid    Visit type: audiovisual    Face to Face time with patient: 20 minutes of  total time spent on the encounter, which includes face to face time and non-face to face time preparing to see the patient (eg, review of tests), Obtaining and/or reviewing separately obtained history, Documenting clinical information in the electronic or other health record, Independently interpreting results (not separately reported) and communicating results to the patient/family/caregiver, or Care coordination (not separately reported).         Each patient to whom he or she provides medical services by telemedicine is:  (1) informed of the relationship between the physician and patient and the respective role of any other health care provider with respect to management of the patient; and (2) notified that he or she may decline to receive medical services by telemedicine and may withdraw from such care at any time.    Notes:   Discussed that she should call nurse/doc on call for any concerns over holiday weekend, keep track of pulse ox and reach out if <93%    Answers submitted by the patient for this visit:  Cough Questionnaire (Submitted on 12/20/2023)  Chief Complaint: Cough  Chronicity: new  Onset: yesterday  Progression since onset: unchanged  Frequency: every few minutes  Cough characteristics: non-productive  ear congestion: No  heartburn: No  hemoptysis: No  nasal congestion: Yes  sweats: Yes  weight loss: No  Aggravated by: nothing  asthma: No  bronchitis: Yes  COPD: No  emphysema: No  pneumonia: Yes  Treatments tried: steroid inhaler  Improvement on treatment: no relief

## 2023-12-20 NOTE — TELEPHONE ENCOUNTER
Spoke with pt, reviewed all meds and allergies for E-visit re: COVID.    Informed pt we will be in touch once Dr. Quispe reviews. If paxlovid is prescribed she would have to stop the Statin. But continue all medications until otherwise instructed. Pt aware an expresses understanding.

## 2023-12-21 ENCOUNTER — HOSPITAL ENCOUNTER (OUTPATIENT)
Dept: RADIOLOGY | Facility: HOSPITAL | Age: 78
Discharge: HOME | End: 2023-12-21
Attending: FAMILY MEDICINE
Payer: MEDICARE

## 2023-12-21 DIAGNOSIS — Z87.01 HISTORY OF PNEUMONIA: ICD-10-CM

## 2023-12-21 DIAGNOSIS — U07.1 COVID-19: ICD-10-CM

## 2023-12-21 PROCEDURE — 71046 X-RAY EXAM CHEST 2 VIEWS: CPT | Mod: 26,HCNC,, | Performed by: RADIOLOGY

## 2023-12-21 PROCEDURE — 71046 XR CHEST PA AND LATERAL: ICD-10-PCS | Mod: 26,HCNC,, | Performed by: RADIOLOGY

## 2023-12-21 PROCEDURE — 71046 X-RAY EXAM CHEST 2 VIEWS: CPT | Mod: TC,HCNC,PO

## 2024-01-22 RX ORDER — ESTRADIOL 0.5 MG/1
0.5 TABLET ORAL DAILY
Qty: 90 TABLET | Refills: 0 | Status: SHIPPED | OUTPATIENT
Start: 2024-01-22 | End: 2024-02-05 | Stop reason: SDUPTHER

## 2024-01-22 NOTE — TELEPHONE ENCOUNTER
Received medication refill request ( Estrace 0.5 mg tab) Patient scheduled for annual exam 02/05/2024.

## 2024-01-31 ENCOUNTER — OFFICE VISIT (OUTPATIENT)
Dept: URGENT CARE | Facility: CLINIC | Age: 79
End: 2024-01-31
Payer: MEDICARE

## 2024-01-31 VITALS
DIASTOLIC BLOOD PRESSURE: 63 MMHG | RESPIRATION RATE: 18 BRPM | SYSTOLIC BLOOD PRESSURE: 96 MMHG | OXYGEN SATURATION: 97 % | TEMPERATURE: 98 F | HEART RATE: 66 BPM | HEIGHT: 64 IN | BODY MASS INDEX: 24.75 KG/M2 | WEIGHT: 145 LBS

## 2024-01-31 DIAGNOSIS — R05.9 COUGH, UNSPECIFIED TYPE: ICD-10-CM

## 2024-01-31 DIAGNOSIS — B96.89 ACUTE BACTERIAL SINUSITIS: Primary | ICD-10-CM

## 2024-01-31 DIAGNOSIS — J01.90 ACUTE BACTERIAL SINUSITIS: Primary | ICD-10-CM

## 2024-01-31 PROCEDURE — 99213 OFFICE O/P EST LOW 20 MIN: CPT | Mod: S$GLB,,,

## 2024-01-31 RX ORDER — AMOXICILLIN AND CLAVULANATE POTASSIUM 875; 125 MG/1; MG/1
1 TABLET, FILM COATED ORAL EVERY 12 HOURS
Qty: 14 TABLET | Refills: 0 | Status: SHIPPED | OUTPATIENT
Start: 2024-01-31 | End: 2024-02-07

## 2024-01-31 RX ORDER — BENZONATATE 200 MG/1
200 CAPSULE ORAL 3 TIMES DAILY PRN
Qty: 30 CAPSULE | Refills: 0 | Status: SHIPPED | OUTPATIENT
Start: 2024-01-31 | End: 2024-02-05

## 2024-01-31 NOTE — PATIENT INSTRUCTIONS
- Rest.    - Drink plenty of fluids. Increasing your fluid intake will help loosen up mucous.    - You have been given an antibiotic to treat your condition today.    - Make sure to take antibiotic with food.   - Please complete the antibiotic as directed on the bottle.   - you can take over-the-counter probiotics during and after antibiotic use to help preserve gut nicola and reduce gastrointestinal symptoms     - You can take over-the-counter claritin, zyrtec, allegra, OR xyzal as directed. These are antihistamines that can help with runny nose, nasal congestion, sneezing, and helps to dry up post-nasal drip, which usually causes sore throat and cough.     - You can use Flonase (fluticasone) nasal spray as directed for sinus congestion and postnasal drip. This is a steroid nasal spray that works locally over time to decrease the inflammation in your nose/sinuses and help with allergic symptoms. This is not an quick- relief spray like afrin, but it works well if used daily.  Discontinue if you develop nose bleed  - Use nasal saline prior to Flonase.  - Use Ocean Spray Nasal Saline 1-3 puffs each nostril every 2-3 hours then blow out onto tissue. This is to irrigate the nasal passage way to clear the sinus openings. Use until sinus problem resolved.    - A Neti Pot with sterile saline can help break up nasal congestion and give relief.      - Chloraseptic throat spray can help numb the throat.     - Warm salt water gargles can help with sore throat.  - Warm tea with honey can help with sore throat and cough. Honey is a natural cough suppressant.     - Acetaminophen (tylenol) or Ibuprofen (advil,motrin) as directed as needed for fever/pain. Avoid tylenol if you have a history of liver disease. Do not take ibuprofen if you have a history of GI bleeding, kidney disease, or if you take blood thinners.   - Ibuprofen dosing for adults: 400 mg by mouth every 4-6 hours as needed. Max: 2400 mg/day; Info: use lowest effective  dose, shortest effective treatment duration; give w/ food if GI upset occurs.  - Tylenol dosing for adults: [By mouth route, immediate-release form] Dose: 325-1000 mg by mouth every 4-6h as needed; Max: 1 g/4h and 4 g/day from all sources. [By mouth route, extended-release form] Dose: 650-1300 mg Extended Release by mouth every 8h as needed; Max: 4 g/day from all sources.     - You must understand that you have received an Urgent Care treatment only and that you may be released before all of your medical problems are known or treated.   - You, the patient, will arrange for follow up care as instructed.   - If your condition worsens or fails to improve we recommend that you receive another evaluation at the ER immediately or contact your PCP to discuss your concerns or return here.   - Follow up with your PCP or specialty clinic as directed in the next 1-2 weeks if not improved or as needed.  You can call [phone] to schedule an appointment with the appropriate provider.    If your symptoms do not improve or worsen, go to the emergency room immediately.

## 2024-01-31 NOTE — PROGRESS NOTES
"Subjective:      Patient ID: Sury Gao is a 78 y.o. female.    Vitals:  height is 5' 4" (1.626 m) and weight is 65.8 kg (145 lb). Her oral temperature is 97.9 °F (36.6 °C). Her blood pressure is 96/63 and her pulse is 66. Her respiration is 18 and oxygen saturation is 97%.     Chief Complaint: URI    This is a 78 y.o. female with a chief complaint of URI    Sx started 9 days ago and is worsening    Sx include nasal congestion, headaches, bilateral ear pressure, subjective fever, fatigue, runyn nose, PND, cough,     Pt has taken flonase, prescription steroid(fluticosone), nasal wash, tylenol for sx with mild relief      URI   This is a new problem. The current episode started 1 to 4 weeks ago. The problem has been gradually worsening. Associated symptoms include congestion, coughing, ear pain, headaches, a plugged ear sensation, rhinorrhea and sinus pain. Pertinent negatives include no sore throat. She has tried acetaminophen and decongestant (nasal wash, fluticosone) for the symptoms.     Constitution: Positive for chills. Negative for sweating, fatigue and fever.   HENT:  Positive for ear pain, congestion, sinus pain and sinus pressure. Negative for sore throat.    Respiratory:  Positive for cough. Negative for sputum production.    Musculoskeletal:  Positive for muscle ache.   Neurological:  Positive for headaches. Negative for disorientation and altered mental status.   Psychiatric/Behavioral:  Negative for altered mental status and disorientation.       Objective:     Physical Exam   Constitutional: She is oriented to person, place, and time. She appears well-developed. She is cooperative.  Non-toxic appearance. She does not appear ill. No distress.      Comments:Patient sits comfortably in exam chair. Answers questions in complete sentences. Does not show any signs of distress or discoloration.        HENT:   Head: Normocephalic and atraumatic.   Ears:   Right Ear: Hearing, tympanic membrane, " external ear and ear canal normal. impacted cerumen  Left Ear: Hearing, tympanic membrane, external ear and ear canal normal. impacted cerumen  Nose: Rhinorrhea, sinus tenderness and congestion present. No mucosal edema or nasal deformity. No epistaxis. Right sinus exhibits maxillary sinus tenderness. Right sinus exhibits no frontal sinus tenderness. Left sinus exhibits maxillary sinus tenderness. Left sinus exhibits no frontal sinus tenderness.   Mouth/Throat: Uvula is midline and mucous membranes are normal. No trismus in the jaw. Normal dentition. No uvula swelling. Posterior oropharyngeal erythema present. No oropharyngeal exudate or posterior oropharyngeal edema. No tonsillar exudate.   Eyes: Conjunctivae and lids are normal. No scleral icterus.   Neck: Trachea normal and phonation normal. Neck supple. No edema present. No erythema present. No neck rigidity present.   Cardiovascular: Normal rate, regular rhythm, normal heart sounds and normal pulses.   Pulmonary/Chest: Effort normal and breath sounds normal. No stridor. No respiratory distress. She has no decreased breath sounds. She has no wheezes. She has no rhonchi. She has no rales.   Abdominal: Normal appearance.   Musculoskeletal: Normal range of motion.         General: No deformity. Normal range of motion.   Lymphadenopathy:     She has no cervical adenopathy.        Right cervical: No superficial cervical, no deep cervical and no posterior cervical adenopathy present.       Left cervical: No superficial cervical, no deep cervical and no posterior cervical adenopathy present.   Neurological: She is alert and oriented to person, place, and time. She exhibits normal muscle tone. Coordination normal.   Skin: Skin is warm, dry, intact, not diaphoretic and not pale.   Psychiatric: Her speech is normal and behavior is normal. Judgment and thought content normal.   Nursing note and vitals reviewed.      Assessment:     1. Acute bacterial sinusitis    2. Cough,  unspecified type        Plan:       Acute bacterial sinusitis  -     amoxicillin-clavulanate 875-125mg (AUGMENTIN) 875-125 mg per tablet; Take 1 tablet by mouth every 12 (twelve) hours. for 7 days  Dispense: 14 tablet; Refill: 0    Cough, unspecified type  -     Cancel: SARS Coronavirus 2 Antigen, POCT Manual Read  -     benzonatate (TESSALON) 200 MG capsule; Take 1 capsule (200 mg total) by mouth 3 (three) times daily as needed for Cough.  Dispense: 30 capsule; Refill: 0                Patient Instructions   - Rest.    - Drink plenty of fluids. Increasing your fluid intake will help loosen up mucous.    - You have been given an antibiotic to treat your condition today.    - Make sure to take antibiotic with food.   - Please complete the antibiotic as directed on the bottle.   - you can take over-the-counter probiotics during and after antibiotic use to help preserve gut nicola and reduce gastrointestinal symptoms     - You can take over-the-counter claritin, zyrtec, allegra, OR xyzal as directed. These are antihistamines that can help with runny nose, nasal congestion, sneezing, and helps to dry up post-nasal drip, which usually causes sore throat and cough.     - You can use Flonase (fluticasone) nasal spray as directed for sinus congestion and postnasal drip. This is a steroid nasal spray that works locally over time to decrease the inflammation in your nose/sinuses and help with allergic symptoms. This is not an quick- relief spray like afrin, but it works well if used daily.  Discontinue if you develop nose bleed  - Use nasal saline prior to Flonase.  - Use Ocean Spray Nasal Saline 1-3 puffs each nostril every 2-3 hours then blow out onto tissue. This is to irrigate the nasal passage way to clear the sinus openings. Use until sinus problem resolved.    - A Neti Pot with sterile saline can help break up nasal congestion and give relief.      - Chloraseptic throat spray can help numb the throat.     - Warm salt  water gargles can help with sore throat.  - Warm tea with honey can help with sore throat and cough. Honey is a natural cough suppressant.     - Acetaminophen (tylenol) or Ibuprofen (advil,motrin) as directed as needed for fever/pain. Avoid tylenol if you have a history of liver disease. Do not take ibuprofen if you have a history of GI bleeding, kidney disease, or if you take blood thinners.   - Ibuprofen dosing for adults: 400 mg by mouth every 4-6 hours as needed. Max: 2400 mg/day; Info: use lowest effective dose, shortest effective treatment duration; give w/ food if GI upset occurs.  - Tylenol dosing for adults: [By mouth route, immediate-release form] Dose: 325-1000 mg by mouth every 4-6h as needed; Max: 1 g/4h and 4 g/day from all sources. [By mouth route, extended-release form] Dose: 650-1300 mg Extended Release by mouth every 8h as needed; Max: 4 g/day from all sources.     - You must understand that you have received an Urgent Care treatment only and that you may be released before all of your medical problems are known or treated.   - You, the patient, will arrange for follow up care as instructed.   - If your condition worsens or fails to improve we recommend that you receive another evaluation at the ER immediately or contact your PCP to discuss your concerns or return here.   - Follow up with your PCP or specialty clinic as directed in the next 1-2 weeks if not improved or as needed.  You can call [phone] to schedule an appointment with the appropriate provider.    If your symptoms do not improve or worsen, go to the emergency room immediately.

## 2024-02-05 ENCOUNTER — OFFICE VISIT (OUTPATIENT)
Dept: OBSTETRICS AND GYNECOLOGY | Facility: CLINIC | Age: 79
End: 2024-02-05
Payer: MEDICARE

## 2024-02-05 VITALS
WEIGHT: 146.38 LBS | SYSTOLIC BLOOD PRESSURE: 98 MMHG | HEIGHT: 64 IN | DIASTOLIC BLOOD PRESSURE: 60 MMHG | BODY MASS INDEX: 24.99 KG/M2

## 2024-02-05 DIAGNOSIS — Z79.890 NEED FOR PROPHYLACTIC HORMONE REPLACEMENT THERAPY (POSTMENOPAUSAL): ICD-10-CM

## 2024-02-05 DIAGNOSIS — M85.80 OSTEOPENIA, UNSPECIFIED LOCATION: ICD-10-CM

## 2024-02-05 DIAGNOSIS — Z01.419 WELL FEMALE EXAM WITH ROUTINE GYNECOLOGICAL EXAM: Primary | ICD-10-CM

## 2024-02-05 DIAGNOSIS — Z12.31 ENCOUNTER FOR SCREENING MAMMOGRAM FOR MALIGNANT NEOPLASM OF BREAST: ICD-10-CM

## 2024-02-05 DIAGNOSIS — N95.9 MENOPAUSAL DISORDER: ICD-10-CM

## 2024-02-05 PROBLEM — R05.9 COUGH: Status: RESOLVED | Noted: 2023-05-29 | Resolved: 2024-02-05

## 2024-02-05 PROCEDURE — 1101F PT FALLS ASSESS-DOCD LE1/YR: CPT | Mod: HCNC,CPTII,S$GLB, | Performed by: OBSTETRICS & GYNECOLOGY

## 2024-02-05 PROCEDURE — 3078F DIAST BP <80 MM HG: CPT | Mod: HCNC,CPTII,S$GLB, | Performed by: OBSTETRICS & GYNECOLOGY

## 2024-02-05 PROCEDURE — 3074F SYST BP LT 130 MM HG: CPT | Mod: HCNC,CPTII,S$GLB, | Performed by: OBSTETRICS & GYNECOLOGY

## 2024-02-05 PROCEDURE — 1126F AMNT PAIN NOTED NONE PRSNT: CPT | Mod: HCNC,CPTII,S$GLB, | Performed by: OBSTETRICS & GYNECOLOGY

## 2024-02-05 PROCEDURE — 99999 PR PBB SHADOW E&M-EST. PATIENT-LVL IV: CPT | Mod: PBBFAC,HCNC,, | Performed by: OBSTETRICS & GYNECOLOGY

## 2024-02-05 PROCEDURE — 1159F MED LIST DOCD IN RCRD: CPT | Mod: HCNC,CPTII,S$GLB, | Performed by: OBSTETRICS & GYNECOLOGY

## 2024-02-05 PROCEDURE — G0101 CA SCREEN;PELVIC/BREAST EXAM: HCPCS | Mod: GZ,HCNC,S$GLB, | Performed by: OBSTETRICS & GYNECOLOGY

## 2024-02-05 PROCEDURE — 1160F RVW MEDS BY RX/DR IN RCRD: CPT | Mod: HCNC,CPTII,S$GLB, | Performed by: OBSTETRICS & GYNECOLOGY

## 2024-02-05 PROCEDURE — 3288F FALL RISK ASSESSMENT DOCD: CPT | Mod: HCNC,CPTII,S$GLB, | Performed by: OBSTETRICS & GYNECOLOGY

## 2024-02-05 RX ORDER — ESTRADIOL 0.5 MG/1
0.5 TABLET ORAL DAILY
Qty: 90 TABLET | Refills: 3 | Status: SHIPPED | OUTPATIENT
Start: 2024-02-05 | End: 2025-02-04

## 2024-02-05 NOTE — PROGRESS NOTES
SUBJECTIVE:   78 y.o. female   for routine gyn exam. No LMP recorded. Patient has had a hysterectomy..  She desires refill of oral ERT for menopausal sx. Aware of WHI and risk of VTE and CAD.         Past Medical History:   Diagnosis Date    Hyperlipidemia     Hypertension     Hypothyroidism     Nuclear sclerosis 2014    Personal history of colonic polyps 2018    Thyroid disease     hypothyroid     Past Surgical History:   Procedure Laterality Date    Bilateral lower extremity varicose vein treatment       CATARACT EXTRACTION, BILATERAL Bilateral     done  & aug or 2022    COLONOSCOPY N/A 2018    CREATION OF URETHRAL SLING USING POLYPROPYLENE TAPE BY RETROPUBIC APPROACH N/A 2020    Procedure: SLING, RETROPUBIC    ;  Surgeon: Maxx Castellanos DO;  Location: [facility];  Service: OB/GYN;  Laterality: N/A;    DILATION AND CURETTAGE OF UTERUS      EYE SURGERY Bilateral     cataract 2022 and 2022    HIP REPLACEMENT ARTHROPLASTY Left     HYSTERECTOMY  2018    TLHBSO with Sacrocolpopexy for prolapse    JOINT REPLACEMENT      L hip    gutierrez neuroma      left foot    MINI-LAPAROTOMY      D&C perforation    OOPHORECTOMY  2018    TONSILLECTOMY      WRIST SURGERY      left wrist: Swave-Kapunjay surgery     Social History     Socioeconomic History    Marital status:    Occupational History    Occupation: retired  and law    Tobacco Use    Smoking status: Former     Current packs/day: 0.00     Average packs/day: 0.3 packs/day for 10.0 years (2.5 ttl pk-yrs)     Types: Cigarettes     Start date: 6/10/1966     Quit date: 6/10/1976     Years since quittin.6    Smokeless tobacco: Former   Substance and Sexual Activity    Alcohol use: Yes     Comment: social    Drug use: No    Sexual activity: Yes     Partners: Male     Birth control/protection: See Surgical Hx     Comment: Hysterectomy   Social History Narrative    Retired         Daughter 47 in  [place], son [place] 43 in [place]  at age 22         Social Determinants of Health     Financial Resource Strain: Low Risk  (2023)    Overall Financial Resource Strain (CARDIA)     Difficulty of Paying Living Expenses: Not hard at all   Food Insecurity: No Food Insecurity (2023)    Hunger Vital Sign     Worried About Running Out of Food in the Last Year: Never true     Ran Out of Food in the Last Year: Never true   Transportation Needs: No Transportation Needs (2023)    PRAPARE - Transportation     Lack of Transportation (Medical): No     Lack of Transportation (Non-Medical): No   Physical Activity: Unknown (2023)    Exercise Vital Sign     Days of Exercise per Week: 3 days   Stress: No Stress Concern Present (2023)    [place] [employer] - Occupational Stress Questionnaire     Feeling of Stress : Not at all   Social Connections: Unknown (2023)    Social Connection and Isolation Panel [NHANES]     Frequency of Communication with Friends and Family: More than three times a week     Frequency of Social Gatherings with Friends and Family: More than three times a week     Active Member of Clubs or Organizations: Yes     Attends Club or Organization Meetings: More than 4 times per year     Marital Status:    Housing Stability: Low Risk  (2023)    Housing Stability Vital Sign     Unable to Pay for Housing in the Last Year: No     Number of Places Lived in the Last Year: 1     Unstable Housing in the Last Year: No     Family History   Problem Relation Age of Onset    Hypothyroidism Mother     Cataracts Mother     Heart disease Mother         Valvular heart disease    Cataracts Father     Macular degeneration Father     Cancer Father         lung    Hypothyroidism Sister     Hypothyroidism Daughter     Cancer Brother         testicular cancer    Amblyopia Neg Hx     Blindness Neg Hx     Glaucoma Neg Hx     Retinal detachment Neg Hx      Strabismus Neg Hx     Breast cancer Neg Hx     Colon cancer Neg Hx     Diabetes Neg Hx     Hypertension Neg Hx     Ovarian cancer Neg Hx     Cervical cancer Neg Hx     Endometrial cancer Neg Hx     Vaginal cancer Neg Hx     Heart attack Neg Hx     Hyperlipidemia Neg Hx     Stroke Neg Hx     Heart failure Neg Hx      OB History    Para Term  AB Living   4 3 3   1 3   SAB IAB Ectopic Multiple Live Births   1       3      # Outcome Date GA Lbr Bertin/2nd Weight Sex Delivery Anes PTL Lv   4 SAB            3 Term            2 Term            1 Term                  Current Outpatient Medications   Medication Sig Dispense Refill    albuterol (PROVENTIL/VENTOLIN HFA) 90 mcg/actuation inhaler Inhale into the lungs.      amoxicillin-clavulanate 875-125mg (AUGMENTIN) 875-125 mg per tablet Take 1 tablet by mouth every 12 (twelve) hours. for 7 days 14 tablet 0    aspirin (ECOTRIN) 81 MG EC tablet Take 81 mg by mouth once daily.      calcium carbonate (OS-SUHAIL) 600 mg calcium (1,500 mg) Tab Take 1,200 mg by mouth once daily.      cholecalciferol, vitamin D3, (VITAMIN D3) 25 mcg (1,000 unit) capsule Take 1,000 Units by mouth once daily.      estradioL (ESTRACE) 0.5 MG tablet Take 1 tablet (0.5 mg total) by mouth once daily. 90 tablet 3    fexofenadine (ALLEGRA) 60 MG tablet Take 60 mg by mouth as needed.      fluticasone-salmeterol diskus inhaler 250-50 mcg Inhale 1 puff into the lungs 2 (two) times daily. Controller 60 each 1    loratadine (CLARITIN) 10 mg tablet Take 10 mg by mouth daily as needed.      multivit,iron,minerals/lutein (CENTRUM SILVER ULTRA WOMEN'S ORAL) Take 1 tablet by mouth once daily.      MYRBETRIQ 25 mg Tb24 ER tablet TAKE 1 TABLET ONE TIME DAILY 90 tablet 1    rosuvastatin (CRESTOR) 10 MG tablet Take 1 tablet (10 mg total) by mouth once daily. 90 tablet 3    SYNTHROID 75 mcg tablet Take 1 tablet (75 mcg total) by mouth once daily. 90 tablet 3    vitC/E/Zn/copper/lutein/zeaxan (ICAPS AREDS2 ORAL)  "Take 2 capsules by mouth once daily.       No current facility-administered medications for this visit.     Allergies: Patient has no known allergies.     ROS:  Constitutional: no weight loss, weight gain, fever, fatigue  Eyes:  No vision changes, glasses/contacts  ENT/Mouth: No ulcers, sinus problems, ears ringing, headache  Cardiovascular: No inability to lie flat, chest pain, exercise intolerance, swelling, heart palpitations  Respiratory: No wheezing, coughing blood, shortness of breath, or cough  Gastrointestinal: No diarrhea, bloody stool, nausea/vomiting, constipation, gas, hemorrhoids  Genitourinary: No blood in urine, painful urination, urgency of urination, frequency of urination, incomplete emptying, incontinence, abnormal bleeding, painful periods, heavy periods, vaginal discharge, vaginal odor, painful intercourse, sexual problems, bleeding after intercourse.  Musculoskeletal: No muscle weakness  Skin/Breast: No painful breasts, nipple discharge, masses, rash, ulcers  Neurological: No passing out, seizures, numbness, headache  Endocrine: No diabetes, hypothyroid, hyperthyroid, hot flashes, hair loss, abnormal hair growth, acne  Psychiatric: No depression, crying  Hematologic: No bruises, bleeding, swollen lymph nodes, anemia.      OBJECTIVE:   The patient appears well, alert, oriented x 3, in no distress.  BP 98/60 (BP Location: Right arm, Patient Position: Sitting, BP Method: Medium (Manual))   Ht 5' 4" (1.626 m)   Wt 66.4 kg (146 lb 6.2 oz)   BMI 25.13 kg/m²   NECK: no thyromegaly, trachea midline  SKIN: no acne, striae, hirsutism  CHEST: CTAB  CV: RRR  BREAST EXAM: breasts appear normal, no suspicious masses, no skin or nipple changes or axillary nodes  ABDOMEN: no hernias, masses, or hepatosplenomegaly  GENITALIA: normal external genitalia, no erythema, no discharge  URETHRA: normal urethra, normal urethral meatus  VAGINA: Normal  CERVIX: absent  UTERUS: absent  ADNEXA: no mass, fullness, " tenderness      ASSESSMENT:   1. Well female exam with routine gynecological exam        2. Need for prophylactic hormone replacement therapy (postmenopausal)        3. Menopausal disorder        4. Osteopenia, unspecified location        5. Encounter for screening mammogram for malignant neoplasm of breast  Mammo Digital Screening Bilat w/ Asim          PLAN:   Orders Placed This Encounter    Mammo Digital Screening Bilat w/ Asim    estradioL (ESTRACE) 0.5 MG tablet     Discussed ERT, WHI, healthy lifestyle including regular exercise, healthy eating, etc.  Return to clinic in 1 year

## 2024-02-07 ENCOUNTER — NURSE TRIAGE (OUTPATIENT)
Dept: ADMINISTRATIVE | Facility: CLINIC | Age: 79
End: 2024-02-07
Payer: MEDICARE

## 2024-02-07 NOTE — TELEPHONE ENCOUNTER
"  Spoke with patient who states her right eye is very watery.  Patient states this is a chronic issue (years).  Patient has never been seen for this.  Patient states she has moderate eyelid swelling on one side.  Advised that patient be seen in the office today.  Patient states she is busy today and it will be better if she goes to an Urgent Care Center rather than making an appointment to see her PCP. Advised that patient call back with worsening symptoms.  Patient verbalized understanding.     Reason for Disposition   MODERATE to SEVERE eyelid swelling on one side  (Exception: Due to a mosquito bite.)    Additional Information   Negative: Unresponsive, passed out or very weak   Negative: Difficulty breathing or wheezing   Negative: Difficulty swallowing or slurred speech and sudden onset   Negative: Sounds like a life-threatening emergency to the triager   Negative: SEVERE eyelid swelling (i.e., shut or almost) and fever   Negative: Eyelid (outer) is very red and fever   Negative: Pregnant 20 or more weeks and sudden weight gain (e.g., more than 3 lbs or 1.4 kg in one week)   Negative: Postpartum (from 0 to 6 weeks after delivery) and sudden weight gain (e.g., more than 3 lbs or 1.4 kg in one week)   Negative: Patient sounds very sick or weak to the triager   Negative: SEVERE eyelid swelling (i.e., shut or almost) and involves both eyes   Negative: SEVERE eyelid swelling and taking an ACE Inhibitor medication (e.g., benazepril/LOTENSIN, captopril/CAPOTEN, enalapril/VASOTEC, lisinopril/ZESTRIL)   Negative: SEVERE eyelid swelling on one side that is red and painful (or tender to touch)   Negative: SEVERE eyelid swelling on one side and sinus pain or pressure   Negative: Fever   Negative: Painful rash and multiple small blisters grouped together (i.e., dermatomal distribution or "band" or "stripe")    Protocols used: Eye - Swelling-A-OH    "

## 2024-03-08 ENCOUNTER — APPOINTMENT (OUTPATIENT)
Dept: RADIOLOGY | Facility: CLINIC | Age: 79
End: 2024-03-08
Attending: FAMILY MEDICINE
Payer: MEDICARE

## 2024-03-08 DIAGNOSIS — Z13.820 OSTEOPOROSIS SCREENING: ICD-10-CM

## 2024-03-08 DIAGNOSIS — M81.0 OSTEOPOROSIS, UNSPECIFIED OSTEOPOROSIS TYPE, UNSPECIFIED PATHOLOGICAL FRACTURE PRESENCE: ICD-10-CM

## 2024-03-08 PROCEDURE — 77080 DXA BONE DENSITY AXIAL: CPT | Mod: 26,HCNC,, | Performed by: INTERNAL MEDICINE

## 2024-03-08 PROCEDURE — 77080 DXA BONE DENSITY AXIAL: CPT | Mod: TC,HCNC,PO

## 2024-03-19 ENCOUNTER — PATIENT MESSAGE (OUTPATIENT)
Dept: PRIMARY CARE CLINIC | Facility: CLINIC | Age: 79
End: 2024-03-19
Payer: MEDICARE

## 2024-05-22 DIAGNOSIS — R39.15 URGENCY OF URINATION: ICD-10-CM

## 2024-05-22 RX ORDER — MIRABEGRON 25 MG/1
25 TABLET, FILM COATED, EXTENDED RELEASE ORAL
Qty: 90 TABLET | Refills: 2 | Status: SHIPPED | OUTPATIENT
Start: 2024-05-22

## 2024-05-22 NOTE — TELEPHONE ENCOUNTER
Refill Decision Note   Sury Gao  is requesting a refill authorization.  Brief Assessment and Rationale for Refill:  Approve     Medication Therapy Plan:         Comments:     Note composed:10:30 AM 05/22/2024

## 2024-05-30 ENCOUNTER — HOSPITAL ENCOUNTER (OUTPATIENT)
Dept: RADIOLOGY | Facility: HOSPITAL | Age: 79
Discharge: HOME | End: 2024-05-30
Attending: OBSTETRICS & GYNECOLOGY
Payer: MEDICARE

## 2024-05-30 VITALS — WEIGHT: 146 LBS | HEIGHT: 64 IN | BODY MASS INDEX: 24.92 KG/M2

## 2024-05-30 DIAGNOSIS — Z12.31 ENCOUNTER FOR SCREENING MAMMOGRAM FOR MALIGNANT NEOPLASM OF BREAST: ICD-10-CM

## 2024-05-30 PROCEDURE — 77063 BREAST TOMOSYNTHESIS BI: CPT | Mod: 26,,, | Performed by: RADIOLOGY

## 2024-05-30 PROCEDURE — 77063 BREAST TOMOSYNTHESIS BI: CPT | Mod: TC

## 2024-05-30 PROCEDURE — 77067 SCR MAMMO BI INCL CAD: CPT | Mod: 26,,, | Performed by: RADIOLOGY

## 2024-08-03 ENCOUNTER — ON-DEMAND VIRTUAL (OUTPATIENT)
Dept: URGENT CARE | Facility: CLINIC | Age: 79
End: 2024-08-03
Payer: MEDICARE

## 2024-08-03 ENCOUNTER — NURSE TRIAGE (OUTPATIENT)
Dept: ADMINISTRATIVE | Facility: CLINIC | Age: 79
End: 2024-08-03
Payer: MEDICARE

## 2024-08-03 DIAGNOSIS — U07.1 COVID-19: Primary | ICD-10-CM

## 2024-08-03 PROCEDURE — 99213 OFFICE O/P EST LOW 20 MIN: CPT | Mod: 95,,, | Performed by: NURSE PRACTITIONER

## 2024-08-03 RX ORDER — NIRMATRELVIR AND RITONAVIR 300-100 MG
KIT ORAL
Qty: 30 TABLET | Refills: 0 | Status: SHIPPED | OUTPATIENT
Start: 2024-08-03

## 2024-08-03 NOTE — PROGRESS NOTES
Subjective:      Patient ID: Sury Gao is a 78 y.o. female.    Vitals:  vitals were not taken for this visit.     Chief Complaint: URI      Visit Type: TELE AUDIOVISUAL    Present with the patient at the time of consultation: TELEMED PRESENT WITH PATIENT: None        Past Medical History:   Diagnosis Date    Hyperlipidemia     Hypertension     Hypothyroidism     Nuclear sclerosis 04/23/2014    Personal history of colonic polyps 02/23/2018    Thyroid disease     hypothyroid     Past Surgical History:   Procedure Laterality Date    Bilateral lower extremity varicose vein treatment       CATARACT EXTRACTION, BILATERAL Bilateral     done jun & aug or sept 2022    COLONOSCOPY N/A 02/23/2018    CREATION OF URETHRAL SLING USING POLYPROPYLENE TAPE BY RETROPUBIC APPROACH N/A 08/21/2020    Procedure: SLING, RETROPUBIC    ;  Surgeon: Maxx Castellanos DO;  Location: [facility];  Service: OB/GYN;  Laterality: N/A;    DILATION AND CURETTAGE OF UTERUS      EYE SURGERY Bilateral     cataract 4/2022 and 7/2022    HIP REPLACEMENT ARTHROPLASTY Left     HYSTERECTOMY  04/09/2018    TLHBSO with Sacrocolpopexy for prolapse    JOINT REPLACEMENT      L hip    gutierrez neuroma      left foot    MINI-LAPAROTOMY      D&C perforation    OOPHORECTOMY  04/09/2018    TONSILLECTOMY      WRIST SURGERY      left wrist: Swave-Kapunjay surgery     Review of patient's allergies indicates:  No Known Allergies  Current Outpatient Medications on File Prior to Visit   Medication Sig Dispense Refill    albuterol (PROVENTIL/VENTOLIN HFA) 90 mcg/actuation inhaler Inhale into the lungs.      aspirin (ECOTRIN) 81 MG EC tablet Take 81 mg by mouth once daily.      calcium carbonate (OS-SUHAIL) 600 mg calcium (1,500 mg) Tab Take 1,200 mg by mouth once daily.      cholecalciferol, vitamin D3, (VITAMIN D3) 25 mcg (1,000 unit) capsule Take 1,000 Units by mouth once daily.      estradioL (ESTRACE) 0.5 MG tablet Take 1 tablet (0.5 mg total) by mouth once  daily. 90 tablet 3    fexofenadine (ALLEGRA) 60 MG tablet Take 60 mg by mouth as needed.      fluticasone-salmeterol diskus inhaler 250-50 mcg Inhale 1 puff into the lungs 2 (two) times daily. Controller 60 each 1    loratadine (CLARITIN) 10 mg tablet Take 10 mg by mouth daily as needed.      mirabegron (MYRBETRIQ) 25 mg Tb24 ER tablet TAKE 1 TABLET EVERY DAY 90 tablet 2    multivit,iron,minerals/lutein (CENTRUM SILVER ULTRA WOMEN'S ORAL) Take 1 tablet by mouth once daily.      rosuvastatin (CRESTOR) 10 MG tablet Take 1 tablet (10 mg total) by mouth once daily. 90 tablet 3    SYNTHROID 75 mcg tablet Take 1 tablet (75 mcg total) by mouth once daily. 90 tablet 3    vitC/E/Zn/copper/lutein/zeaxan (ICAPS AREDS2 ORAL) Take 2 capsules by mouth once daily.       No current facility-administered medications on file prior to visit.     Family History   Problem Relation Name Age of Onset    Hypothyroidism Mother 96     Cataracts Mother 96     Heart disease Mother 96         Valvular heart disease    Cataracts Father      Macular degeneration Father      Cancer Father          lung    Hypothyroidism Sister      Hypothyroidism Daughter      Cancer Brother          testicular cancer    Amblyopia Neg Hx      Blindness Neg Hx      Glaucoma Neg Hx      Retinal detachment Neg Hx      Strabismus Neg Hx      Breast cancer Neg Hx      Colon cancer Neg Hx      Diabetes Neg Hx      Hypertension Neg Hx      Ovarian cancer Neg Hx      Cervical cancer Neg Hx      Endometrial cancer Neg Hx      Vaginal cancer Neg Hx      Heart attack Neg Hx      Hyperlipidemia Neg Hx      Stroke Neg Hx      Heart failure Neg Hx             Ohs Peq Odvv Intake    8/3/2024 11:11 AM CDT - Filed by Patient   What is your current physical address in the event of a medical emergency? [address]   Are you able to take your vital signs? Yes   Systolic Blood Pressure:    Diastolic Blood Pressure:    Weight: 144   Height: 64   Pulse:    Temperature: 97.3    Respiration rate:    Pulse Oxygen: 94   Please attach any relevant images or files          77 yo female with c/o covid positive this morning. She states she started with symptoms 5 days ago. She states she had fever and congestion. She states she started coughing last night and had headache and deep cough. She states she had covid since Tuesday.         Constitution: Positive for generalized weakness. Negative for fever.   HENT:  Positive for congestion and postnasal drip. Negative for sore throat.    Cardiovascular:  Negative for sob on exertion.   Respiratory:  Positive for cough and sputum production. Negative for shortness of breath and wheezing.    Allergic/Immunologic: Positive for sneezing.   Neurological:  Negative for headaches.        Objective:   The physical exam was conducted virtually.  LOCATION OF PATIENT hoem  Physical Exam   Constitutional: She is oriented to person, place, and time. She appears well-developed.   HENT:   Head: Normocephalic and atraumatic.   Ears:   Right Ear: Hearing, tympanic membrane and external ear normal.   Left Ear: Hearing, tympanic membrane and external ear normal.   Nose: Congestion present.   Mouth/Throat: Uvula is midline, oropharynx is clear and moist and mucous membranes are normal.   Eyes: Conjunctivae and EOM are normal. Pupils are equal, round, and reactive to light.   Neck: Neck supple.   Cardiovascular: Normal rate.   Pulmonary/Chest: Effort normal and breath sounds normal.   Musculoskeletal: Normal range of motion.         General: Normal range of motion.   Neurological: She is alert and oriented to person, place, and time.   Skin: Skin is warm.   Psychiatric: Her behavior is normal. Thought content normal.   Nursing note and vitals reviewed.      Assessment:     1. COVID-19        Plan:   Do not take cholesterol medication or inhaler while taking paxlovid    The CDC has recently changed guideline to include patient that are Covid-19 positive should stay home  until theyve been fever-free without medication for at least 24 hours and their symptoms have been improving for 24 hours.      Important home care recommendations include: monitor your symptoms, if you have trouble breathing please go to the ER. Stay in a separate room from other household members, if possible.  Use a separate bathroom, if possible.  Avoid contact with other members of the household and pets.  Don't share personal household items, like cups, towels, and utensils.  Wear a mask when around other people if able.  Please refer to CDC's websit for more information about what to do if you you're sick and how to notify your contacts.    Stay home except to get medical care. Most people with COVID-19 have mild illness and can recover at home without medical care. Do not leave your home, except to get medical care. Do not visit public areas. Get rest and stay hydrated. Call before you get medical care. Be sure to get care if you have trouble breathing, or have any other emergency warning signs, or if you think it is an emergency.  Avoid public transportation, ride-sharing, or taxis.  Separate yourself from other people.  As much as possible, stay in a specific room and away from other people and pets in your home. If possible, you should use a separate bathroom. If you need to be around other people or animals in or outside of the home, wear a mask.     If someone is showing any of these signs, seek emergency medical care immediately:  Trouble breathing  Persistent pain or pressure in the chest  New confusion  Inability to wake or stay awake  Pale, gray, or blue-colored skin, lips, or nail beds, depending on skin tone  *This list is not all possible symptoms. Please call your medical provider for any other symptoms that are severe or concerning to you.    Call ahead before visiting your doctor  Call ahead. Many medical visits for routine care are being postponed or done by phone or telemedicine.  If you  have a medical appointment that cannot be postponed, call your doctors office, and tell them you have or may have COVID-19. This will help the office protect themselves and other patients.    You dont need to wear the mask if you are alone. If you cant put on a mask (because of trouble breathing, for example), cover your coughs and sneezes in some other way. Try to stay at least 6 feet away from other people. This will help protect the people around you.  Masks should not be placed on young children under age 2 years, anyone who has trouble breathing, or anyone who is not able to remove the mask without help.    Cover your coughs and sneezes  Cover your mouth and nose with a tissue when you cough or sneeze.  Throw away used tissues in a lined trash can.  Immediately wash your hands with soap and water for at least 20 seconds. If soap and water are not available, clean your hands with an alcohol-based hand  that contains at least 60% alcohol.  hands wash light icon  Clean your hands often  Wash your hands often with soap and water for at least 20 seconds. This is especially important after blowing your nose, coughing, or sneezing; going to the bathroom; and before eating or preparing food.  Use hand  if soap and water are not available. Use an alcohol-based hand  with at least 60% alcohol, covering all surfaces of your hands and rubbing them together until they feel dry.  Soap and water are the best option, especially if hands are visibly dirty.  Avoid touching your eyes, nose, and mouth with unwashed hands.  Handwashing Tips  Avoid sharing personal household items  Do not share dishes, drinking glasses, cups, eating utensils, towels, or bedding with other people in your home.  Wash these items thoroughly after using them with soap and water or put in the .  spraybottle icon  Clean all high-touch surfaces everyday  Clean and disinfect high-touch surfaces in your sick room  and bathroom; wear disposable gloves. Let someone else clean and disinfect surfaces in common areas, but you should clean your bedroom and bathroom, if possible.  If a caregiver or other person needs to clean and disinfect a sick persons bedroom or bathroom, they should do so on an as-needed basis. The caregiver/other person should wear a mask and disposable gloves prior to cleaning. They should wait as long as possible after the person who is sick has used the bathroom before coming in to clean and use the bathroom.  High-touch surfaces include phones, remote controls, counters, tabletops, doorknobs, bathroom fixtures, toilets, keyboards, tablets, and bedside tables.    Clean and disinfect areas that may have blood, stool, or body fluids on them.  Use household  and disinfectants. Clean the area or item with soap and water or another detergent if it is dirty. Then, use a household disinfectant.  Be sure to follow the instructions on the label to ensure safe and effective use of the product. Many products recommend keeping the surface wet for several minutes to ensure germs are killed. Many also recommend precautions such as wearing gloves and making sure you have good ventilation during use of the product.  Use a product from EPAs List N: Disinfectants for Coronavirus (COVID-19)external icon.  Complete Disinfection Guidance      PLEASE READ YOUR DISCHARGE INSTRUCTIONS ENTIRELY AS IT CONTAINS IMPORTANT INFORMATION.    Please drink plenty of fluids.    Please get plenty of rest.    If not allergic, please take over the counter Tylenol (Acetaminophen) and/or Motrin (Ibuprofen) as directed for control of muscle aches, pain, and/or fever.    Please go to the Emergency Department for any shortness of breath or difficulty breathing.    For congestion, runny nose, or post nasal drip please take an over the counter antihistamine medication (Claritin/Zyrtec/Allegra) of your choice as directed or try an over the  counter decongestant like Sudafed. You buy this behind the pharmacy counter.  You can also buy combination OTC antihistamine plus decongestant medications such at Zyrtec-D or Claritin-D.  Do not take decongestant if you suffer from high blood pressure.    For cough, you may be prescribed medication.  Take as prescribed.  If you were not prescribed any medication, you can use over the counter cough medications such as Robitussin.  If you have chest congestion you can consider using over the counter Mucinex but make sure you drink plenty of water to encourage mobilization of the secretions.    If you do have high blood pressure or palpitations, it is safe to take Coricidin HBP for relief of sinus symptoms.    Sore throat recommendations: Warm fluids (tea with honey, soup, broth), warm salt water gargles, throat lozenges, rest, hydration.    If you  smoke, please stop smoking.    You must understand that you've received an Urgent Care treatment only and that you may be released before all of your medical problems are known or treated. You, the patient, will arrange for follow up as instructed. If your symptoms worsen or fail to improve you should go to the Emergency Room.      COVID-19

## 2024-08-03 NOTE — TELEPHONE ENCOUNTER
Pt co virus symptoms that started Tuesday took covid test it was negative then fever until Wednesday on Thursday and Friday felt fine this am woke up with headache and cough took covid test and tested positive. Per protocol instructed pt to discuss with pcp within 24 hrs.   Reason for Disposition   [1] HIGH RISK patient (e.g., weak immune system, age > 64 years, obesity with BMI 30 or higher, pregnant, chronic lung disease or other chronic medical condition) AND [2] COVID symptoms (e.g., cough, fever)  (Exceptions: Already seen by PCP and no new or worsening symptoms.)    Additional Information   Negative: SEVERE difficulty breathing (e.g., struggling for each breath, speaks in single words)   Negative: Difficult to awaken or acting confused (e.g., disoriented, slurred speech)   Negative: Bluish (or gray) lips or face now   Negative: Shock suspected (e.g., cold/pale/clammy skin, too weak to stand, low BP, rapid pulse)   Negative: Sounds like a life-threatening emergency to the triager   Negative: SEVERE or constant chest pain or pressure  (Exception: Mild central chest pain, present only when coughing.)   Negative: MODERATE difficulty breathing (e.g., speaks in phrases, SOB even at rest, pulse 100-120)   Negative: [1] Headache AND [2] stiff neck (can't touch chin to chest)   Negative: Oxygen level (e.g., pulse oximetry) 90 percent or lower   Negative: Chest pain or pressure  (Exception: MILD central chest pain, present only when coughing.)   Negative: [1] Drinking very little AND [2] dehydration suspected (e.g., no urine > 12 hours, very dry mouth, very lightheaded)   Negative: Patient sounds very sick or weak to the triager   Negative: MILD difficulty breathing (e.g., minimal/no SOB at rest, SOB with walking, pulse <100)   Negative: Fever > 103 F (39.4 C)   Negative: [1] Fever > 101 F (38.3 C) AND [2] age > 60 years   Negative: [1] Fever > 100.0 F (37.8 C) AND [2] bedridden (e.g., CVA, chronic illness, recovering  from surgery)   Negative: Oxygen level (e.g., pulse oximetry) 91 to 94 percent    Protocols used: Coronavirus (COVID-19) Diagnosed or [id number]

## 2024-08-06 NOTE — TELEPHONE ENCOUNTER
Spoke with pt re; message from nurse on call. Pt co virus symptoms that started Tuesday took covid test it was negative then fever until Wednesday on Thursday and Friday felt fine this am woke up with headache and cough took covid test and tested positive. Pt had an on demand virtual visit, and was prescribed nirmatrelvir-ritonavir (PAXLOVID).     Pt states she is feeling better today, and does not think she needs to follow up with  as of now. Pt will follow up with us if she needs anything.

## 2024-10-16 DIAGNOSIS — E03.9 ACQUIRED HYPOTHYROIDISM: ICD-10-CM

## 2024-10-16 RX ORDER — LEVOTHYROXINE SODIUM 75 UG/1
75 TABLET ORAL
Qty: 90 TABLET | Refills: 0 | Status: SHIPPED | OUTPATIENT
Start: 2024-10-16

## 2024-10-16 NOTE — TELEPHONE ENCOUNTER
Refill Decision Note   Sury Gao  is requesting a refill authorization.  Brief Assessment and Rationale for Refill:  Approve     Medication Therapy Plan:         Comments:     Note composed:11:56 AM 10/16/2024

## 2024-10-16 NOTE — TELEPHONE ENCOUNTER
No care due was identified.  [facility] Embedded Care Due Messages. Reference number: [id number].   10/16/2024 3:16:58 AM CDT

## 2024-11-06 DIAGNOSIS — E78.5 HYPERLIPIDEMIA, UNSPECIFIED HYPERLIPIDEMIA TYPE: ICD-10-CM

## 2024-11-06 RX ORDER — ROSUVASTATIN CALCIUM 10 MG/1
10 TABLET, COATED ORAL
Qty: 90 TABLET | Refills: 0 | Status: SHIPPED | OUTPATIENT
Start: 2024-11-06

## 2024-11-06 NOTE — TELEPHONE ENCOUNTER
No care due was identified.  [facility] Embedded Care Due Messages. Reference number: [id number].   11/06/2024 2:55:37 AM CST

## 2024-11-07 NOTE — TELEPHONE ENCOUNTER
Refill Routing Note   Medication(s) are not appropriate for processing by [facility] for the following reason(s):        Drug-drug interaction    [facility] action(s):  Defer      Medication Therapy Plan: Drug-Drug: rosuvastatin, PAXLOVID and rosuvastatin, PAXLOVID    Pharmacist review requested: Yes     Appointments  past 12m or future 3m with PCP    Date Provider   Last Visit   12/20/2023 Anne Quispe MD   Next Visit   Visit date not found Anne Quispe MD   ED visits in past 90 days: 0        Note composed:8:54 PM 11/06/2024

## 2024-11-07 NOTE — TELEPHONE ENCOUNTER
Refill Decision Note   Sury Gao  is requesting a refill authorization.  Brief Assessment and Rationale for Refill:  Approve     Medication Therapy Plan: Drug-Drug: INTERACTION REVIEWED OK TO FILL      Pharmacist review requested: Yes   Comments:     Note composed:10:04 PM 11/06/2024

## 2024-12-28 DIAGNOSIS — R39.15 URGENCY OF URINATION: ICD-10-CM

## 2024-12-29 RX ORDER — MIRABEGRON 25 MG/1
25 TABLET, FILM COATED, EXTENDED RELEASE ORAL
Qty: 90 TABLET | Refills: 0 | Status: SHIPPED | OUTPATIENT
Start: 2024-12-29

## 2024-12-29 NOTE — TELEPHONE ENCOUNTER
06/12/17 1748   Group VI   Start Time 1700   StopTime 1745   Group Length (min) 45 min   Group Type Dinner   Group Focus Meal plan compliance   Number in attendance 4   Patient Response Other  (pt. left programming early for approved activity)   Comments pt did not eat dinner during programming due to leaving early for an approved activity       Refill Decision Note   Sury Gao  is requesting a refill authorization.  Brief Assessment and Rationale for Refill:  Approve     Medication Therapy Plan:         Comments:     Note composed:8:55 AM 12/29/2024

## 2025-01-19 DIAGNOSIS — E78.5 HYPERLIPIDEMIA, UNSPECIFIED HYPERLIPIDEMIA TYPE: ICD-10-CM

## 2025-01-19 RX ORDER — ESTRADIOL 0.5 MG/1
0.5 TABLET ORAL
Qty: 90 TABLET | Refills: 0 | Status: SHIPPED | OUTPATIENT
Start: 2025-01-19

## 2025-01-19 NOTE — TELEPHONE ENCOUNTER
No care due was identified.  [facility] Embedded Care Due Messages. Reference number: [id number].   1/19/2025 1:35:13 PM CST

## 2025-01-21 RX ORDER — ROSUVASTATIN CALCIUM 10 MG/1
10 TABLET, COATED ORAL
Qty: 90 TABLET | Refills: 0 | Status: SHIPPED | OUTPATIENT
Start: 2025-01-21

## 2025-01-21 NOTE — TELEPHONE ENCOUNTER
Refill Decision Note   Sury Gao  is requesting a refill authorization.  Brief Assessment and Rationale for Refill:  Approve     Medication Therapy Plan:        Pharmacist review requested: Yes   Extended chart review required: Yes   Comments:     Note composed:10:48 AM 01/21/2025

## 2025-01-21 NOTE — TELEPHONE ENCOUNTER
Refill Routing Note   Medication(s) are not appropriate for processing by [facility] for the following reason(s):        Drug-drug interaction:     [facility] action(s):  Defer    Medication Therapy Plan: Drug-Drug: rosuvastatin and PAXLOVID    Pharmacist review requested: Yes     Appointments  past 12m or future 3m with PCP    Date Provider   Last Visit   12/20/2023 Anne Quispe MD   Next Visit   Visit date not found Anne Quispe MD   ED visits in past 90 days: 0        Note composed:10:24 AM 01/21/2025

## 2025-01-22 NOTE — PROGRESS NOTES
[facility] Progress Note  1/27/2025          Reason for Visit:      had concerns including Follow-up (Possible Hashimoto's disease).       History of Present Illness:     HPI   79 y/o female former smoker (quit in the 70's) with hyperlipidemia, aortic atherosclerosis, hypothyroidism, history of colon polyps, diverticulosis, osteopenia, history of basal cell carcinoma of skin, here today to discuss thyroid tests after learning of family history of Hashimoto's thyroiditis    Hypothyroidism:   levothyroxine 75 mcg daily   Has been on replacement for a long time.  No specific preivous diagnosis.  Pt wonders about the possiblity of hashimoto's given the recently discovered family history  We were agreeable to do antibody testing - advised it probably wouldn't  much    COPD  On albuterol prn  Previous smoker- quit in 1970s  No recent cough/dyspnea    Dyslipdemia/ Aortic atherosclerosis (per CXR 5/2023)  Calcium score 0 (done for HRT clearance), asa 81 mg daily, crestor 10 mg daily        Problem List:     Patient Active Problem List   Diagnosis    Hypothyroidism    Menopausal disorder    Osteopenia    Hyperlipidemia    Diverticulosis    History of basal cell carcinoma (BCC) of skin    Overweight (BMI 25.0-29.9)    Need for prophylactic hormone replacement therapy (postmenopausal)    Chronic obstructive pulmonary disease, unspecified COPD type         Medications:       Current Outpatient Medications:     albuterol (PROVENTIL/VENTOLIN HFA) 90 mcg/actuation inhaler, Inhale into the lungs., Disp: , Rfl:     aspirin (ECOTRIN) 81 MG EC tablet, Take 81 mg by mouth as needed., Disp: , Rfl:     calcium carbonate (OS-SUHAIL) 600 mg calcium (1,500 mg) Tab, Take 1,200 mg by mouth once daily., Disp: , Rfl:     cholecalciferol, vitamin D3, (VITAMIN D3) 25 mcg (1,000 unit) capsule, Take 1,000 Units by mouth once daily., Disp: , Rfl:     estradioL (ESTRACE) 0.5 MG tablet, TAKE 1 TABLET ONE TIME  "DAILY, Disp: 90 tablet, Rfl: 0    fexofenadine (ALLEGRA) 60 MG tablet, Take 60 mg by mouth as needed., Disp: , Rfl:     fluticasone-salmeterol diskus inhaler 250-50 mcg, Inhale 1 puff into the lungs 2 (two) times daily. Controller, Disp: 60 each, Rfl: 1    multivit,iron,minerals/lutein (CENTRUM SILVER ULTRA WOMEN'S ORAL), Take 1 tablet by mouth once daily., Disp: , Rfl:     MYRBETRIQ 25 mg Tb24 ER tablet, TAKE 1 TABLET EVERY DAY, Disp: 90 tablet, Rfl: 0    rosuvastatin (CRESTOR) 10 MG tablet, TAKE 1 TABLET EVERY DAY, Disp: 90 tablet, Rfl: 0    SYNTHROID 75 mcg tablet, TAKE 1 TABLET EVERY DAY, Disp: 90 tablet, Rfl: 0        Review of Systems:     Review of Systems   Constitutional:  Negative for activity change and unexpected weight change.   HENT:  Negative for hearing loss, rhinorrhea and trouble swallowing.    Eyes:  Negative for discharge and visual disturbance.   Respiratory:  Negative for chest tightness and wheezing.    Cardiovascular:  Negative for chest pain and palpitations.   Gastrointestinal:  Negative for blood in stool, constipation, diarrhea and vomiting.   Endocrine: Negative for polydipsia and polyuria.   Genitourinary:  Negative for difficulty urinating, dysuria, hematuria and menstrual problem.   Musculoskeletal:  Negative for arthralgias, joint swelling and neck pain.   Neurological:  Negative for weakness and headaches.   Psychiatric/Behavioral:  Negative for confusion and dysphoric mood.            Physical Exam:     Temp:             Blood Pressure:  127/60        Pulse:   66     Respirations:     Weight:   67.9 kg (149 lb 11.1 oz)  Height:   5' 4" (1.626 m)  BMI:     Body mass index is 25.69 kg/m².    Physical Exam  Constitutional:       General: She is not in acute distress.  HENT:      Right Ear: Tympanic membrane normal.      Left Ear: Tympanic membrane normal.      Nose: No congestion or rhinorrhea.      Mouth/Throat:      Pharynx: No oropharyngeal exudate or posterior oropharyngeal " erythema.   Cardiovascular:      Rate and Rhythm: Normal rate and regular rhythm.   Pulmonary:      Effort: Pulmonary effort is normal. No respiratory distress.      Breath sounds: No wheezing.   Abdominal:      Palpations: Abdomen is soft.      Tenderness: There is no abdominal tenderness.   Skin:     General: Skin is warm.   Neurological:      General: No focal deficit present.      Mental Status: She is alert and oriented to person, place, and time.       Labs/Imaging/Testing:     Most recent CBC:  Lab Results   Component Value Date    WBC 6.07 03/08/2024    HGB 14.1 03/08/2024     03/08/2024    MCV 97 03/08/2024       Most recent Lipids:  Lab Results   Component Value Date    CHOL 140 03/08/2024    HDL 61 03/08/2024    LDLCALC 68.6 03/08/2024    TRIG 52 03/08/2024       Most recent Chemistry:  Lab Results   Component Value Date     03/08/2024    K 4.1 03/08/2024     03/08/2024    CO2 29 03/08/2024    BUN 15 03/08/2024    CREATININE 0.8 03/08/2024    GLU 87 03/08/2024    CALCIUM 8.8 03/08/2024    ALT 13 03/08/2024    AST 18 03/08/2024    ALKPHOS 31 (L) 03/08/2024    PROT 5.4 (L) 03/08/2024    ALBUMIN 3.0 (L) 03/08/2024       Other tests:  Lab Results   Component Value Date    TSH 2.421 03/08/2024    FREET4 1.01 08/05/2020    HGBA1C 5.2 03/08/2024    RZLBXOBK12QB 37 04/15/2021    MG 2.1 04/15/2021    CRP 0.3 12/19/2014    SEDRATE 1 12/19/2014         Assessment and Plan:     1. Hypothyroidism, unspecified type  Continue levothyroxine,  Check thyroid functions  Will also check TPO Ab but advised that likely wouldn't affect management  - TSH; Future  - T4, Free; Future  - Thyroid Peroxidase Antibody; Future    2. Chronic obstructive pulmonary disease, unspecified COPD type  Stable on albuterol    3. Hyperlipidemia, unspecified hyperlipidemia type  Continue crestor, aspriin  - Comprehensive Metabolic Panel; Future  - CBC Auto Differential; Future  - Lipid Panel; Future  - Hemoglobin A1C; Future  -  TSH; Future    4. IFG (impaired fasting glucose)  - Hemoglobin A1C; Future     5. Aortic atherosclerosis  Continue crestor, aspirin    RTC 6 mos or sooner boaz Wing MD  1/27/2025    This note was prepared using voice-recognition software.  Although efforts are made to proofread the note, some errors may persist in the final document.

## 2025-01-27 ENCOUNTER — OFFICE VISIT (OUTPATIENT)
Dept: PRIMARY CARE CLINIC | Facility: CLINIC | Age: 80
End: 2025-01-27
Payer: MEDICARE

## 2025-01-27 VITALS
WEIGHT: 149.69 LBS | SYSTOLIC BLOOD PRESSURE: 127 MMHG | HEIGHT: 64 IN | DIASTOLIC BLOOD PRESSURE: 60 MMHG | OXYGEN SATURATION: 97 % | HEART RATE: 66 BPM | BODY MASS INDEX: 25.56 KG/M2

## 2025-01-27 DIAGNOSIS — I70.0 AORTIC ATHEROSCLEROSIS: ICD-10-CM

## 2025-01-27 DIAGNOSIS — E03.9 HYPOTHYROIDISM, UNSPECIFIED TYPE: Primary | ICD-10-CM

## 2025-01-27 DIAGNOSIS — R73.01 IFG (IMPAIRED FASTING GLUCOSE): ICD-10-CM

## 2025-01-27 DIAGNOSIS — J44.9 CHRONIC OBSTRUCTIVE PULMONARY DISEASE, UNSPECIFIED COPD TYPE: ICD-10-CM

## 2025-01-27 DIAGNOSIS — E78.5 HYPERLIPIDEMIA, UNSPECIFIED HYPERLIPIDEMIA TYPE: ICD-10-CM

## 2025-01-27 PROCEDURE — 3074F SYST BP LT 130 MM HG: CPT | Mod: HCNC,CPTII,S$GLB, | Performed by: INTERNAL MEDICINE

## 2025-01-27 PROCEDURE — 1101F PT FALLS ASSESS-DOCD LE1/YR: CPT | Mod: HCNC,CPTII,S$GLB, | Performed by: INTERNAL MEDICINE

## 2025-01-27 PROCEDURE — 3288F FALL RISK ASSESSMENT DOCD: CPT | Mod: HCNC,CPTII,S$GLB, | Performed by: INTERNAL MEDICINE

## 2025-01-27 PROCEDURE — 99999 PR PBB SHADOW E&M-EST. PATIENT-LVL IV: CPT | Mod: PBBFAC,HCNC,, | Performed by: INTERNAL MEDICINE

## 2025-01-27 PROCEDURE — 99214 OFFICE O/P EST MOD 30 MIN: CPT | Mod: HCNC,S$GLB,, | Performed by: INTERNAL MEDICINE

## 2025-01-27 PROCEDURE — 3078F DIAST BP <80 MM HG: CPT | Mod: HCNC,CPTII,S$GLB, | Performed by: INTERNAL MEDICINE

## 2025-01-27 PROCEDURE — 1159F MED LIST DOCD IN RCRD: CPT | Mod: HCNC,CPTII,S$GLB, | Performed by: INTERNAL MEDICINE

## 2025-01-27 PROCEDURE — 1126F AMNT PAIN NOTED NONE PRSNT: CPT | Mod: HCNC,CPTII,S$GLB, | Performed by: INTERNAL MEDICINE

## 2025-01-27 NOTE — PROGRESS NOTES
[facility] Progress Note  1/27/2025    Pt seen in conjunction with MS4 Evaristo Donna today - a separate note was filed for this visit.      Reason for Visit:      had concerns including Follow-up (Possible Hashimoto's disease).       History of Present Illness:     Ms. Gao is a 79 year old female with a past medical history hypothyroidism, HLD, Osteopenia who presents with concerns regarding a family history of hashimoto thyroiditis.     The patient reports that she is here to discuss whether she has Hashimoto thyroiditis as an etiology for her hypothyroidism which she was diagnosed with since 2023, well controlled.     The patient also reports concerns with mild right shoulder pain 3/10 after shoveling snow this past week. Patient reports that it hurts when she sleeps on this. Patient reports the pain has only bothered her this past week.  Denies any past trauma to the area. Patient describes pain as sharp and shooting in nature. Has full range of motion bilaterally. Pain radiates down R upper extremity and to back. Denies any neck pain or stiffness. Endorses mild reduction in strength. Denies any numbness or tingling of the R upper extremity or fingers.  Patient has taken OTC PRN NSAIDs which has helped with the pain. Patient endorses that the pain is more prevalent and annoying when driving.          Family Hx:    Mother - Hypothyroidism    Siblings - Hypothyroidism in 5/ 10 siblings mixed M/F. Two brothers without hypothyroid issues have daughters with Hashimoto's Hypothyroidism.     Daughter - Hashimoto Hypothyroidism      Social Hx:     Lives at home alone.  Retired - .  Maintains active lifestyle.  Maintains healthy diet.  Has 3 children plus grandchildren, with multiple family members living locally.   Denies Recent Tobacco Use. Social Smoker as Child stopped when became pregnant. Estimated smoking years = 8 with less than 1/2 ppd.   Drinks Alcohol,  one/two glasses of wine 2/3 times a week.   Denies illicit drug / substance use.       Problem List:     Patient Active Problem List   Diagnosis    Hypothyroidism    Menopausal disorder    Osteopenia    Hyperlipidemia    Diverticulosis    History of basal cell carcinoma (BCC) of skin    Overweight (BMI 25.0-29.9)    Need for prophylactic hormone replacement therapy (postmenopausal)         Medications:       Current Outpatient Medications:     albuterol (PROVENTIL/VENTOLIN HFA) 90 mcg/actuation inhaler, Inhale into the lungs., Disp: , Rfl:     aspirin (ECOTRIN) 81 MG EC tablet, Take 81 mg by mouth as needed., Disp: , Rfl:     calcium carbonate (OS-SUHAIL) 600 mg calcium (1,500 mg) Tab, Take 1,200 mg by mouth once daily., Disp: , Rfl:     cholecalciferol, vitamin D3, (VITAMIN D3) 25 mcg (1,000 unit) capsule, Take 1,000 Units by mouth once daily., Disp: , Rfl:     estradioL (ESTRACE) 0.5 MG tablet, TAKE 1 TABLET ONE TIME DAILY, Disp: 90 tablet, Rfl: 0    fexofenadine (ALLEGRA) 60 MG tablet, Take 60 mg by mouth as needed., Disp: , Rfl:     fluticasone-salmeterol diskus inhaler 250-50 mcg, Inhale 1 puff into the lungs 2 (two) times daily. Controller, Disp: 60 each, Rfl: 1    multivit,iron,minerals/lutein (CENTRUM SILVER ULTRA WOMEN'S ORAL), Take 1 tablet by mouth once daily., Disp: , Rfl:     MYRBETRIQ 25 mg Tb24 ER tablet, TAKE 1 TABLET EVERY DAY, Disp: 90 tablet, Rfl: 0    rosuvastatin (CRESTOR) 10 MG tablet, TAKE 1 TABLET EVERY DAY, Disp: 90 tablet, Rfl: 0    SYNTHROID 75 mcg tablet, TAKE 1 TABLET EVERY DAY, Disp: 90 tablet, Rfl: 0        Review of Systems:     Review of Systems   Constitutional:  Negative for activity change and unexpected weight change.   HENT:  Negative for nasal congestion, dental problem, ear discharge, ear pain, facial swelling, hearing loss, mouth dryness, mouth sores, nosebleeds, postnasal drip, rhinorrhea, sinus pressure/congestion, sneezing, sore throat and trouble swallowing.    Eyes:   "Negative for discharge and visual disturbance.   Respiratory:  Negative for cough, chest tightness, shortness of breath, wheezing and stridor.    Cardiovascular:  Negative for chest pain and palpitations.   Gastrointestinal:  Negative for abdominal pain, anal bleeding, blood in stool, change in bowel habit, constipation, diarrhea, nausea, vomiting and reflux.   Endocrine: Negative for polydipsia and polyuria.   Genitourinary:  Negative for difficulty urinating, dysuria, hematuria and menstrual problem.   Musculoskeletal:  Negative for arthralgias, joint swelling and neck pain.   Integumentary:  Negative for breast mass, breast discharge and breast tenderness.   Neurological:  Negative for weakness and headaches.   Psychiatric/Behavioral:  Negative for confusion, decreased concentration, depressed mood, dysphoric mood, sleep disturbance and suicidal ideas. The patient is not nervous/anxious.    Breast: Negative for mass and tenderness          Physical Exam:     Temp:             Blood Pressure:  127/60        Pulse:   66     Respirations:     Weight:   67.9 kg (149 lb 11.1 oz)  Height:   5' 4" (1.626 m)  BMI:     Body mass index is 25.69 kg/m².    Physical Exam  Constitutional:       Appearance: Normal appearance.   HENT:      Head: Normocephalic and atraumatic.      Nose: Nose normal.      Mouth/Throat:      Mouth: Mucous membranes are moist.      Pharynx: Oropharynx is clear.   Eyes:      Extraocular Movements: Extraocular movements intact.      Conjunctiva/sclera: Conjunctivae normal.      Pupils: Pupils are equal, round, and reactive to light.   Cardiovascular:      Rate and Rhythm: Normal rate and regular rhythm.   Pulmonary:      Effort: Pulmonary effort is normal.      Breath sounds: Normal breath sounds.   Musculoskeletal:         General: No swelling, tenderness, deformity or signs of injury. Normal range of motion.      Cervical back: Normal range of motion.      Comments: Slight Right shoulder pain with " active / passive movements of right arm at flexion above 90* and extension at extreme behind coronal plane. Patient has full range of motion. Strength of UE is 5/5 bilaterally.  strength preserved bilaterally. Empty can test positive on the R.    Skin:     General: Skin is warm and dry.      Capillary Refill: Capillary refill takes less than 2 seconds.   Neurological:      Mental Status: She is alert.   Psychiatric:         Mood and Affect: Mood normal.         Behavior: Behavior normal.         Thought Content: Thought content normal.         Judgment: Judgment normal.       Labs/Imaging/Testing:       Previous Dexa Scan 2023  Shows Mild Progression of Osteopenia / Osteoporosis     Fracture Risk (FRAX)     14% risk of a major osteoporotic fracture in the next 10 years.     3.9% risk of hip fracture in the next 10 years.     Impression:     *Osteoporosis based on T-score between -1.0 and -2.5 and elevated risk based on FRAX     RECOMMENDATIONS:  *Daily calcium intake 8888-3045 mg, dietary sources preferred; Vitamin D 5737-6528 IU daily.  *Weight bearing exercise and fall precautions.  *Recommend medical therapy for osteoporosis with high risk for fracture. Consider bisphosphonates (alendronate, risedronate, zoledronic acid), or denosumab.  *Repeat BMD in 2 years.      Previous TSH data within Range:                Component Ref Range & Units 10 mo ago  (3/8/24) 2 yr ago  (1/24/23) 3 yr ago  (11/29/21) 3 yr ago  (4/15/21) 4 yr ago  (8/5/20) 5 yr ago  (9/17/19) 7 yr ago  (10/23/17)   TSH 0.400 - 4.000 uIU/mL 2.421 2.460 1.273 0.805 2.529 1.275 2.466              Assessment and Plan:       Right shoulder pain     - Continue with OTC PRN NSAIDs  - Rest with heat/ice for 2-3 weeks     2. Hypothyroidism     - Well controlled hypothyroidism on synthroid 75mg   - Repeat TSH / Free T4 / Free T3 labs on routine lab screening     3. Wellness    - RSV vaccine   - Routine yearly labs with lipid panel ( fasting )   - DEXA due  2026  - Follow up OBGYN care with OBGYN.            Evaristo Thompson, MS4, OCS.   1/27/2025    This note was prepared using voice-recognition software.  Although efforts are made to proofread the note, some errors may persist in the final document.

## 2025-02-04 ENCOUNTER — LAB VISIT (OUTPATIENT)
Dept: LAB | Facility: HOSPITAL | Age: 80
End: 2025-02-04
Attending: INTERNAL MEDICINE
Payer: MEDICARE

## 2025-02-04 DIAGNOSIS — E03.9 HYPOTHYROIDISM, UNSPECIFIED TYPE: ICD-10-CM

## 2025-02-04 DIAGNOSIS — E78.5 HYPERLIPIDEMIA, UNSPECIFIED HYPERLIPIDEMIA TYPE: ICD-10-CM

## 2025-02-04 DIAGNOSIS — R73.01 IFG (IMPAIRED FASTING GLUCOSE): ICD-10-CM

## 2025-02-04 LAB
ALBUMIN SERPL BCP-MCNC: 3.1 G/DL (ref 3.5–5.2)
ALP SERPL-CCNC: 30 U/L (ref 40–150)
ALT SERPL W/O P-5'-P-CCNC: 13 U/L (ref 10–44)
ANION GAP SERPL CALC-SCNC: 10 MMOL/L (ref 8–16)
AST SERPL-CCNC: 19 U/L (ref 10–40)
BASOPHILS # BLD AUTO: 0.06 K/UL (ref 0–0.2)
BASOPHILS NFR BLD: 1 % (ref 0–1.9)
BILIRUB SERPL-MCNC: 0.5 MG/DL (ref 0.1–1)
BUN SERPL-MCNC: 18 MG/DL (ref 8–23)
CALCIUM SERPL-MCNC: 8.7 MG/DL (ref 8.7–10.5)
CHLORIDE SERPL-SCNC: 106 MMOL/L (ref 95–110)
CHOLEST SERPL-MCNC: 140 MG/DL (ref 120–199)
CHOLEST/HDLC SERPL: 2.2 {RATIO} (ref 2–5)
CO2 SERPL-SCNC: 24 MMOL/L (ref 23–29)
CREAT SERPL-MCNC: 0.8 MG/DL (ref 0.5–1.4)
DIFFERENTIAL METHOD BLD: ABNORMAL
EOSINOPHIL # BLD AUTO: 0.1 K/UL (ref 0–0.5)
EOSINOPHIL NFR BLD: 1.9 % (ref 0–8)
ERYTHROCYTE [DISTWIDTH] IN BLOOD BY AUTOMATED COUNT: 13.3 % (ref 11.5–14.5)
EST. GFR  (NO RACE VARIABLE): >60 ML/MIN/1.73 M^2
ESTIMATED AVG GLUCOSE: 100 MG/DL (ref 68–131)
GLUCOSE SERPL-MCNC: 82 MG/DL (ref 70–110)
HBA1C MFR BLD: 5.1 % (ref 4–5.6)
HCT VFR BLD AUTO: 44.2 % (ref 37–48.5)
HDLC SERPL-MCNC: 65 MG/DL (ref 40–75)
HDLC SERPL: 46.4 % (ref 20–50)
HGB BLD-MCNC: 14.4 G/DL (ref 12–16)
IMM GRANULOCYTES # BLD AUTO: 0.02 K/UL (ref 0–0.04)
IMM GRANULOCYTES NFR BLD AUTO: 0.3 % (ref 0–0.5)
LDLC SERPL CALC-MCNC: 65.2 MG/DL (ref 63–159)
LYMPHOCYTES # BLD AUTO: 1.9 K/UL (ref 1–4.8)
LYMPHOCYTES NFR BLD: 32.4 % (ref 18–48)
MCH RBC QN AUTO: 31.7 PG (ref 27–31)
MCHC RBC AUTO-ENTMCNC: 32.6 G/DL (ref 32–36)
MCV RBC AUTO: 97 FL (ref 82–98)
MONOCYTES # BLD AUTO: 0.4 K/UL (ref 0.3–1)
MONOCYTES NFR BLD: 7.3 % (ref 4–15)
NEUTROPHILS # BLD AUTO: 3.4 K/UL (ref 1.8–7.7)
NEUTROPHILS NFR BLD: 57.1 % (ref 38–73)
NONHDLC SERPL-MCNC: 75 MG/DL
NRBC BLD-RTO: 0 /100 WBC
PLATELET # BLD AUTO: 250 K/UL (ref 150–450)
PMV BLD AUTO: 11.6 FL (ref 9.2–12.9)
POTASSIUM SERPL-SCNC: 4.7 MMOL/L (ref 3.5–5.1)
PROT SERPL-MCNC: 5.6 G/DL (ref 6–8.4)
RBC # BLD AUTO: 4.54 M/UL (ref 4–5.4)
SODIUM SERPL-SCNC: 140 MMOL/L (ref 136–145)
T4 FREE SERPL-MCNC: 0.91 NG/DL (ref 0.71–1.51)
THYROPEROXIDASE IGG SERPL-ACNC: 7.5 IU/ML
TRIGL SERPL-MCNC: 49 MG/DL (ref 30–150)
TSH SERPL DL<=0.005 MIU/L-ACNC: 3.56 UIU/ML (ref 0.4–4)
WBC # BLD AUTO: 5.92 K/UL (ref 3.9–12.7)

## 2025-02-04 PROCEDURE — 86376 MICROSOMAL ANTIBODY EACH: CPT | Mod: HCNC | Performed by: INTERNAL MEDICINE

## 2025-02-04 PROCEDURE — 84443 ASSAY THYROID STIM HORMONE: CPT | Mod: HCNC | Performed by: INTERNAL MEDICINE

## 2025-02-04 PROCEDURE — 80053 COMPREHEN METABOLIC PANEL: CPT | Mod: HCNC | Performed by: INTERNAL MEDICINE

## 2025-02-04 PROCEDURE — 84439 ASSAY OF FREE THYROXINE: CPT | Mod: HCNC | Performed by: INTERNAL MEDICINE

## 2025-02-04 PROCEDURE — 83036 HEMOGLOBIN GLYCOSYLATED A1C: CPT | Mod: HCNC | Performed by: INTERNAL MEDICINE

## 2025-02-04 PROCEDURE — 36415 COLL VENOUS BLD VENIPUNCTURE: CPT | Mod: HCNC,PN | Performed by: INTERNAL MEDICINE

## 2025-02-04 PROCEDURE — 80061 LIPID PANEL: CPT | Mod: HCNC | Performed by: INTERNAL MEDICINE

## 2025-02-04 PROCEDURE — 85025 COMPLETE CBC W/AUTO DIFF WBC: CPT | Mod: HCNC | Performed by: INTERNAL MEDICINE

## 2025-02-07 ENCOUNTER — TELEPHONE (OUTPATIENT)
Dept: PRIMARY CARE CLINIC | Facility: CLINIC | Age: 80
End: 2025-02-07
Payer: MEDICARE

## 2025-02-07 ENCOUNTER — PATIENT MESSAGE (OUTPATIENT)
Dept: PRIMARY CARE CLINIC | Facility: CLINIC | Age: 80
End: 2025-02-07
Payer: MEDICARE

## 2025-02-07 DIAGNOSIS — M79.89 LEG SWELLING: ICD-10-CM

## 2025-02-07 RX ORDER — FUROSEMIDE 20 MG/1
20 TABLET ORAL DAILY PRN
Qty: 60 TABLET | Refills: 0 | Status: SHIPPED | OUTPATIENT
Start: 2025-02-07 | End: 2026-02-07

## 2025-02-07 NOTE — TELEPHONE ENCOUNTER
----- Message from Itzel sent at 2/7/2025 11:05 AM CST -----  Contact: [phone]  Patient called, requested a call back from Dr Quispe's nurse in regards some medication that she have questions and may need refill.

## 2025-02-07 NOTE — TELEPHONE ENCOUNTER
Dr. Quispe pt.     Spoke with pt regarding swelling concerns. Pt stated that she was placed on Lasix in the past due to retaining fluid. Pt stated that she has noticed an increase in retaining fluid when eating out at restaurants. Pt denied having any symptoms such as lasix. Pt would like a new rx of lasix to have on hand. Please advise.

## 2025-02-07 NOTE — TELEPHONE ENCOUNTER
No care due was identified.  [facility] Embedded Care Due Messages. Reference number: [id number].   2/07/2025 11:48:25 AM CST

## 2025-02-21 DIAGNOSIS — Z00.00 ENCOUNTER FOR MEDICARE ANNUAL WELLNESS EXAM: ICD-10-CM

## 2025-03-13 DIAGNOSIS — R39.15 URGENCY OF URINATION: ICD-10-CM

## 2025-03-13 DIAGNOSIS — E03.9 ACQUIRED HYPOTHYROIDISM: ICD-10-CM

## 2025-03-13 RX ORDER — LEVOTHYROXINE SODIUM 75 UG/1
75 TABLET ORAL
Qty: 90 TABLET | Refills: 0 | Status: SHIPPED | OUTPATIENT
Start: 2025-03-13

## 2025-03-13 RX ORDER — MIRABEGRON 25 MG/1
25 TABLET, FILM COATED, EXTENDED RELEASE ORAL
Qty: 90 TABLET | Refills: 0 | Status: SHIPPED | OUTPATIENT
Start: 2025-03-13

## 2025-03-13 NOTE — TELEPHONE ENCOUNTER
Refill Decision Note   Sury Gao  is requesting a refill authorization.  Brief Assessment and Rationale for Refill:  Approve     Medication Therapy Plan:         Comments:     Note composed:11:31 AM 03/13/2025

## 2025-03-13 NOTE — TELEPHONE ENCOUNTER
No care due was identified.  [facility] Embedded Care Due Messages. Reference number: [id number].   3/13/2025 3:20:52 AM CDT

## 2025-03-17 ENCOUNTER — OFFICE VISIT (OUTPATIENT)
Dept: OBSTETRICS AND GYNECOLOGY | Facility: CLINIC | Age: 80
End: 2025-03-17
Payer: MEDICARE

## 2025-03-17 VITALS
HEIGHT: 64 IN | SYSTOLIC BLOOD PRESSURE: 108 MMHG | BODY MASS INDEX: 25.9 KG/M2 | WEIGHT: 151.69 LBS | DIASTOLIC BLOOD PRESSURE: 70 MMHG

## 2025-03-17 DIAGNOSIS — Z71.89 COUNSELING FOR ESTROGEN REPLACEMENT THERAPY: ICD-10-CM

## 2025-03-17 DIAGNOSIS — Z12.31 ENCOUNTER FOR SCREENING MAMMOGRAM FOR MALIGNANT NEOPLASM OF BREAST: ICD-10-CM

## 2025-03-17 DIAGNOSIS — Z12.11 COLON CANCER SCREENING: ICD-10-CM

## 2025-03-17 DIAGNOSIS — Z01.419 WELL FEMALE EXAM WITH ROUTINE GYNECOLOGICAL EXAM: Primary | ICD-10-CM

## 2025-03-17 DIAGNOSIS — R39.15 URGENCY OF URINATION: ICD-10-CM

## 2025-03-17 PROCEDURE — 1160F RVW MEDS BY RX/DR IN RCRD: CPT | Mod: HCNC,CPTII,S$GLB, | Performed by: OBSTETRICS & GYNECOLOGY

## 2025-03-17 PROCEDURE — 1159F MED LIST DOCD IN RCRD: CPT | Mod: HCNC,CPTII,S$GLB, | Performed by: OBSTETRICS & GYNECOLOGY

## 2025-03-17 PROCEDURE — 1126F AMNT PAIN NOTED NONE PRSNT: CPT | Mod: HCNC,CPTII,S$GLB, | Performed by: OBSTETRICS & GYNECOLOGY

## 2025-03-17 PROCEDURE — 3078F DIAST BP <80 MM HG: CPT | Mod: HCNC,CPTII,S$GLB, | Performed by: OBSTETRICS & GYNECOLOGY

## 2025-03-17 PROCEDURE — 1101F PT FALLS ASSESS-DOCD LE1/YR: CPT | Mod: HCNC,CPTII,S$GLB, | Performed by: OBSTETRICS & GYNECOLOGY

## 2025-03-17 PROCEDURE — 3288F FALL RISK ASSESSMENT DOCD: CPT | Mod: HCNC,CPTII,S$GLB, | Performed by: OBSTETRICS & GYNECOLOGY

## 2025-03-17 PROCEDURE — 3074F SYST BP LT 130 MM HG: CPT | Mod: HCNC,CPTII,S$GLB, | Performed by: OBSTETRICS & GYNECOLOGY

## 2025-03-17 PROCEDURE — 99999 PR PBB SHADOW E&M-EST. PATIENT-LVL III: CPT | Mod: PBBFAC,HCNC,, | Performed by: OBSTETRICS & GYNECOLOGY

## 2025-03-17 PROCEDURE — G0101 CA SCREEN;PELVIC/BREAST EXAM: HCPCS | Mod: GZ,HCNC,S$GLB, | Performed by: OBSTETRICS & GYNECOLOGY

## 2025-03-17 RX ORDER — ESTRADIOL 0.5 MG/1
0.5 TABLET ORAL DAILY
Qty: 90 TABLET | Refills: 3 | Status: SHIPPED | OUTPATIENT
Start: 2025-03-17

## 2025-03-17 RX ORDER — MIRABEGRON 25 MG/1
25 TABLET, FILM COATED, EXTENDED RELEASE ORAL DAILY
Qty: 90 TABLET | Refills: 3 | Status: SHIPPED | OUTPATIENT
Start: 2025-03-17

## 2025-03-17 NOTE — PROGRESS NOTES
SUBJECTIVE:   79 y.o. female   for routine gyn exam. No LMP recorded. Patient has had a hysterectomy..  She desires refill of ERT and Myrbetriq.         Past Medical History:   Diagnosis Date    Hyperlipidemia     Hypertension     Hypothyroidism     Nuclear sclerosis 2014    Personal history of colonic polyps 2018    Thyroid disease     hypothyroid     Past Surgical History:   Procedure Laterality Date    Bilateral lower extremity varicose vein treatment       CATARACT EXTRACTION, BILATERAL Bilateral     done  & aug or 2022    COLONOSCOPY N/A 2018    CREATION OF URETHRAL SLING USING POLYPROPYLENE TAPE BY RETROPUBIC APPROACH N/A 2020    Procedure: SLING, RETROPUBIC    ;  Surgeon: Maxx Castellanos DO;  Location: [facility] OR;  Service: OB/GYN;  Laterality: N/A;    DILATION AND CURETTAGE OF UTERUS      EYE SURGERY Bilateral     cataract 2022 and 2022    HIP REPLACEMENT ARTHROPLASTY Left     HYSTERECTOMY  2018    TLHBSO with Sacrocolpopexy for prolapse    JOINT REPLACEMENT      L hip    gutierrez neuroma      left foot    MINI-LAPAROTOMY      D&C perforation    OOPHORECTOMY  2018    TONSILLECTOMY      WRIST SURGERY      left wrist: Swave-Kapunjay surgery     Social History[1]  Family History   Problem Relation Name Age of Onset    Hypothyroidism Mother 96     Cataracts Mother 96     Heart disease Mother 96         Valvular heart disease    Cataracts Father      Macular degeneration Father      Cancer Father          lung    Hypothyroidism Sister      Hypothyroidism Daughter      Cancer Brother          testicular cancer    Amblyopia Neg Hx      Blindness Neg Hx      Glaucoma Neg Hx      Retinal detachment Neg Hx      Strabismus Neg Hx      Breast cancer Neg Hx      Colon cancer Neg Hx      Diabetes Neg Hx      Hypertension Neg Hx      Ovarian cancer Neg Hx      Cervical cancer Neg Hx      Endometrial cancer Neg Hx      Vaginal cancer Neg Hx      Heart attack Neg Hx    "   Hyperlipidemia Neg Hx      Stroke Neg Hx      Heart failure Neg Hx       OB History    Para Term  AB Living   4 3 3  1 3   SAB IAB Ectopic Multiple Live Births   1    3      # Outcome Date GA Lbr Bertin/2nd Weight Sex Type Anes PTL Lv   4 SAB            3 Term            2 Term            1 Term                  Current Medications[2]  Allergies: Patient has no known allergies.     ROS:  Constitutional: no weight loss, weight gain, fever, fatigue  Eyes:  No vision changes, glasses/contacts  ENT/Mouth: No ulcers, sinus problems, ears ringing, headache  Cardiovascular: No inability to lie flat, chest pain, exercise intolerance, swelling, heart palpitations  Respiratory: No wheezing, coughing blood, shortness of breath, or cough  Gastrointestinal: No diarrhea, bloody stool, nausea/vomiting, constipation, gas, hemorrhoids  Genitourinary: No blood in urine, painful urination, urgency of urination, frequency of urination, incomplete emptying, incontinence, abnormal bleeding, painful periods, heavy periods, vaginal discharge, vaginal odor, painful intercourse, sexual problems, bleeding after intercourse.  Musculoskeletal: No muscle weakness  Skin/Breast: No painful breasts, nipple discharge, masses, rash, ulcers  Neurological: No passing out, seizures, numbness, headache  Endocrine: No diabetes, hypothyroid, hyperthyroid, hot flashes, hair loss, abnormal hair growth, acne  Psychiatric: No depression, crying  Hematologic: No bruises, bleeding, swollen lymph nodes, anemia.      OBJECTIVE:   The patient appears well, alert, oriented x 3, in no distress.  /70 (BP Location: Right arm, Patient Position: Sitting)   Ht 5' 4" (1.626 m)   Wt 68.8 kg (151 lb 10.8 oz)   BMI 26.04 kg/m²   NECK: no thyromegaly, trachea midline  SKIN: no acne, striae, hirsutism  CHEST: CTAB  CV: RRR  BREAST EXAM: breasts appear normal, no suspicious masses, no skin or nipple changes or axillary nodes  ABDOMEN: no hernias, masses, " or hepatosplenomegaly  GENITALIA: normal external genitalia, no erythema, no discharge  URETHRA: normal urethra, normal urethral meatus  VAGINA: Normal  CERVIX: absent  UTERUS: absent  ADNEXA: no mass, fullness, tenderness      ASSESSMENT:   1. Well female exam with routine gynecological exam        2. Encounter for screening mammogram for malignant neoplasm of breast  Mammo Digital Screening Bilat w/ Asim (XPD)      3. Counseling for estrogen replacement therapy        4. Colon cancer screening  Ambulatory referral/consult to Endo Procedure       5. Urgency of urination  mirabegron (MYRBETRIQ) 25 mg Tb24 ER tablet          PLAN:   Orders Placed This Encounter    Mammo Digital Screening Bilat w/ Asim (XPD)    Ambulatory referral/consult to Endo Procedure     estradioL (ESTRACE) 0.5 MG tablet    mirabegron (MYRBETRIQ) 25 mg Tb24 ER tablet     Discussed ERT, risks of WHI, Myrbetriq, healthy lifestyle including regular exercise, healthy eating, etc.  Return to clinic in 1 year         [1]   Social History  Socioeconomic History    Marital status:    Occupational History    Occupation: retired  and law    Tobacco Use    Smoking status: Former     Current packs/day: 0.00     Average packs/day: 0.3 packs/day for 10.0 years (2.5 ttl pk-yrs)     Types: Cigarettes     Start date: 6/10/1966     Quit date: 6/10/1976     Years since quittin.8     Passive exposure: Never    Smokeless tobacco: Former   Substance and Sexual Activity    Alcohol use: Yes     Comment: social    Drug use: No    Sexual activity: Yes     Partners: Male     Birth control/protection: See Surgical Hx     Comment: Hysterectomy   Social History Narrative    Retired         D[place] 47 in [place], [place] [facility] [place] 22         Social Drivers of Health     Financial Resource Strain: Low Risk  (2025)    Overall Financial Resource Strain (CARDIA)     Difficulty of Paying Living  Expenses: Not hard at all   Food Insecurity: No Food Insecurity (1/26/2025)    Hunger Vital Sign     Worried About Running Out of Food in the Last Year: Never true     Ran Out of Food in the Last Year: Never true   Transportation Needs: No Transportation Needs (12/20/2023)    PRAPARE - Transportation     Lack of Transportation (Medical): No     Lack of Transportation (Non-Medical): No   Physical Activity: Unknown (1/26/2025)    Exercise Vital Sign     Days of Exercise per Week: 2 days   Stress: No Stress Concern Present (1/26/2025)    [employer] [place] [employer] - Occupational Stress Questionnaire     Feeling of Stress : Not at all   Housing Stability: Unknown (1/26/2025)    Housing Stability Vital Sign     Unable to Pay for Housing in the Last Year: Patient declined   [2]   Current Outpatient Medications   Medication Sig Dispense Refill    albuterol (PROVENTIL/VENTOLIN HFA) 90 mcg/actuation inhaler Inhale into the lungs.      aspirin (ECOTRIN) 81 MG EC tablet Take 81 mg by mouth as needed.      calcium carbonate (OS-SUHAIL) 600 mg calcium (1,500 mg) Tab Take 1,200 mg by mouth once daily.      cholecalciferol, vitamin D3, (VITAMIN D3) 25 mcg (1,000 unit) capsule Take 1,000 Units by mouth once daily.      fexofenadine (ALLEGRA) 60 MG tablet Take 60 mg by mouth as needed.      furosemide (LASIX) 20 MG tablet Take 1 tablet (20 mg total) by mouth daily as needed (swelling). 60 tablet 0    multivit,iron,minerals/lutein (CENTRUM SILVER ULTRA WOMEN'S ORAL) Take 1 tablet by mouth once daily.      rosuvastatin (CRESTOR) 10 MG tablet TAKE 1 TABLET EVERY DAY 90 tablet 0    SYNTHROID 75 mcg tablet TAKE 1 TABLET EVERY DAY 90 tablet 0    estradioL (ESTRACE) 0.5 MG tablet Take 1 tablet (0.5 mg total) by mouth once daily. 90 tablet 3    mirabegron (MYRBETRIQ) 25 mg Tb24 ER tablet Take 1 tablet (25 mg total) by mouth once daily. 90 tablet 3     No current facility-administered medications for this visit.

## 2025-04-04 DIAGNOSIS — E78.5 HYPERLIPIDEMIA, UNSPECIFIED HYPERLIPIDEMIA TYPE: ICD-10-CM

## 2025-04-04 RX ORDER — ROSUVASTATIN CALCIUM 10 MG/1
TABLET, COATED ORAL
Qty: 90 TABLET | Refills: 3 | Status: SHIPPED | OUTPATIENT
Start: 2025-04-04

## 2025-04-04 NOTE — TELEPHONE ENCOUNTER
No care due was identified.  [facility] Embedded Care Due Messages. Reference number: [id number].   4/04/2025 2:07:37 AM CDT

## 2025-05-06 ENCOUNTER — OFFICE VISIT (OUTPATIENT)
Dept: PRIMARY CARE CLINIC | Facility: CLINIC | Age: 80
End: 2025-05-06
Payer: MEDICARE

## 2025-05-06 VITALS
HEIGHT: 64 IN | SYSTOLIC BLOOD PRESSURE: 118 MMHG | WEIGHT: 151.44 LBS | OXYGEN SATURATION: 96 % | BODY MASS INDEX: 25.85 KG/M2 | DIASTOLIC BLOOD PRESSURE: 64 MMHG | HEART RATE: 86 BPM

## 2025-05-06 DIAGNOSIS — E78.5 HYPERLIPIDEMIA, UNSPECIFIED HYPERLIPIDEMIA TYPE: Primary | ICD-10-CM

## 2025-05-06 DIAGNOSIS — Z85.828 HISTORY OF BASAL CELL CARCINOMA (BCC) OF SKIN: ICD-10-CM

## 2025-05-06 DIAGNOSIS — E03.9 ACQUIRED HYPOTHYROIDISM: ICD-10-CM

## 2025-05-06 DIAGNOSIS — M79.89 LEG SWELLING: ICD-10-CM

## 2025-05-06 DIAGNOSIS — I70.0 AORTIC ATHEROSCLEROSIS: ICD-10-CM

## 2025-05-06 PROCEDURE — 1159F MED LIST DOCD IN RCRD: CPT | Mod: CPTII,S$GLB,, | Performed by: FAMILY MEDICINE

## 2025-05-06 PROCEDURE — 1126F AMNT PAIN NOTED NONE PRSNT: CPT | Mod: CPTII,S$GLB,, | Performed by: FAMILY MEDICINE

## 2025-05-06 PROCEDURE — 1160F RVW MEDS BY RX/DR IN RCRD: CPT | Mod: CPTII,S$GLB,, | Performed by: FAMILY MEDICINE

## 2025-05-06 PROCEDURE — 3078F DIAST BP <80 MM HG: CPT | Mod: CPTII,S$GLB,, | Performed by: FAMILY MEDICINE

## 2025-05-06 PROCEDURE — 99999 PR PBB SHADOW E&M-EST. PATIENT-LVL IV: CPT | Mod: PBBFAC,HCNC,, | Performed by: FAMILY MEDICINE

## 2025-05-06 PROCEDURE — 3288F FALL RISK ASSESSMENT DOCD: CPT | Mod: CPTII,S$GLB,, | Performed by: FAMILY MEDICINE

## 2025-05-06 PROCEDURE — 99214 OFFICE O/P EST MOD 30 MIN: CPT | Mod: S$GLB,,, | Performed by: FAMILY MEDICINE

## 2025-05-06 PROCEDURE — 1101F PT FALLS ASSESS-DOCD LE1/YR: CPT | Mod: CPTII,S$GLB,, | Performed by: FAMILY MEDICINE

## 2025-05-06 PROCEDURE — 3074F SYST BP LT 130 MM HG: CPT | Mod: CPTII,S$GLB,, | Performed by: FAMILY MEDICINE

## 2025-05-06 RX ORDER — FUROSEMIDE 20 MG/1
20 TABLET ORAL DAILY PRN
Qty: 60 TABLET | Refills: 3 | Status: SHIPPED | OUTPATIENT
Start: 2025-05-06 | End: 2026-05-06

## 2025-05-06 RX ORDER — LEVOTHYROXINE SODIUM 75 UG/1
75 TABLET ORAL
Qty: 90 TABLET | Refills: 3 | Status: SHIPPED | OUTPATIENT
Start: 2025-05-06

## 2025-05-06 NOTE — TELEPHONE ENCOUNTER
Refill Routing Note   Medication(s) are not appropriate for processing by [facility] for the following reason(s):        Patient not seen by provider within 15 months    [facility] action(s):  Defer               Appointments  past 12m or future 3m with PCP    Date Provider   Last Visit   2/5/2024 Magnolia Zavala MD   Next Visit   3/17/2025 Magnolia Zavala MD   ED visits in past 90 days: 0        Note composed:6:16 AM 03/13/2025                 [Anterior rhinoscopy insufficient to account for symptoms] : anterior rhinoscopy insufficient to account for symptoms [None] : none [Flexible Endoscope] : examined with the flexible endoscope [de-identified] : Tyson Belcher [de-identified] : Nasal endoscopy  Left: The septum is midline The inferior turbinate was well reduced/outfractured MM and SER are clear   Right: The septum is midline, thick crusting removed from prior septal swell body The inferior turbinate was well reduced/outfractured Stephanie resected with smal crust removed from lateral side The MM and SER are clear. [[email or web address]] : Nasal Endoscopy: Procedure: Bilateral nasal endoscopy (CPT 61689)   Indication: Anterior rhinoscopy was inadequate to evaluate pathology.  Left: Septum deviated L Moderate inferior turbinate hypertrophy  MM with thin stream of white mucus Sphenoethmoidal recess clear, without masses, polyps, or pus  Right:  Septum deviated L,  Moderate inferior turbinate hypertrophy Middle meatus clear, without masses, polyps, or pus  Sphenoethmoidal recess clear, without masses, polyps, or pus

## 2025-05-06 NOTE — PROGRESS NOTES
Subjective:       Patient ID: Sury Gao is a 79 y.o. female.    Chief Complaint: Follow-up (Pt here for 4 month f/u), Hypothyroidism, and Hyperlipidemia    HPI 80 y/o female former smoker (quit in the 70's) with hyperlipidemia, aortic atherosclerosis, hypothyroidism, history of colon polyps, diverticulosis, osteoporosis, history of basal cell carcinoma of skin, questionable history of TIA, hx of Covid is here for Hypothyroidism follow up.    She reports over the past year she tried to start back at [employer] and she ended up with knee pain, sprained ankle and hip bursitis, she follows with Rey Reilly and in PT. She takes Ibuprofen 400 mg once every few weeks. Since then she has not been has active, she is starting back with Pilates and swimming and is more active around the house. She feels good otherwise, she denies f/n/v/d/constipation/cp/sob/urinary sx. She is taking Lasix 20 mg 1-2 days a week for weight gain and foot swelling when she eats salty or processed food. Sleeping ok. Mood ok.      Hx of Covid 12/2021 mild outpatient  chronic R mid to lower back pain for about 10 years, no accident or injury, pain is constant but varies with intensity depending on what she is doing, worse with singing and vaccuming and sweeping, pain is a soreness usually 4/10, not radiating, no imaging done in the past or formal PT, using tylenol or ibuprofen 3 times a months  ?TIA: symptoms occurred post op hysterectomy 3 years ago, work up benign at , was seen by Dr. Escobar  HLPD/aortic atherosclerosis cxr 5/2023: calcium score 0 (done for HRT clearance), asa 81 mg daily, crestor 10 mg daily  Hypothyroidism: levothyroxine 75 mcg daily   Leg swelling: has lasix 20 mg for prn use  GYN: followed by Dr. Zavala, pelvic utd, estradiol, myrbetriq, mmg 5/2024 plans to do it every other year  Osteoporosis: dexa 3/2024 stable, calcium and vit d daily  2/2018, colon polyps, diverticulosis repeat 5 years, due now not  "sure if she wants to do it or not  Eye exam utd Dr. jeter  Dental utd  Hx of BCC: followed by Dr. Rupert Molina  OTC: Ibuprofen a few times a month, AREDs, MVI, Zinc     Review of Systems    Objective:      /64 (BP Location: Left arm, Patient Position: Sitting)   Pulse 86   Ht 5' 4" (1.626 m)   Wt 68.7 kg (151 lb 7.3 oz)   SpO2 96%   BMI 26.00 kg/m²   Physical Exam  Vitals and nursing note reviewed.   Constitutional:       Appearance: She is well-developed.   HENT:      Head: Normocephalic and atraumatic.      Right Ear: Tympanic membrane normal.      Left Ear: Tympanic membrane normal.      Mouth/Throat:      Pharynx: No oropharyngeal exudate.   Neck:      Thyroid: No thyromegaly.   Cardiovascular:      Rate and Rhythm: Normal rate and regular rhythm.      Heart sounds: Normal heart sounds.   Pulmonary:      Effort: Pulmonary effort is normal. No respiratory distress.      Breath sounds: Normal breath sounds.   Abdominal:      General: Bowel sounds are normal. There is no distension.      Palpations: Abdomen is soft. There is no mass.      Tenderness: There is no abdominal tenderness.   Musculoskeletal:      Cervical back: Normal range of motion and neck supple.      Right lower leg: Edema present.      Left lower leg: Edema present.      Comments: 1+ knee down bilaterally   Lymphadenopathy:      Cervical: No cervical adenopathy.   Skin:     General: Skin is warm and dry.   Neurological:      Mental Status: She is alert.         Assessment:       1. Hyperlipidemia, unspecified hyperlipidemia type    2. Acquired hypothyroidism    3. Leg swelling    4. History of basal cell carcinoma (BCC) of skin    5. Aortic atherosclerosis        Plan:   Sury was seen today for follow-up, hypothyroidism and hyperlipidemia.    Diagnoses and all orders for this visit:    Hyperlipidemia, unspecified hyperlipidemia type  -     Comprehensive Metabolic Panel; Future    Acquired hypothyroidism  -     SYNTHROID 75 mcg tablet; Take " 1 tablet (75 mcg total) by mouth before breakfast.    Leg swelling  -     Comprehensive Metabolic Panel; Future  -     furosemide (LASIX) 20 MG tablet; Take 1 tablet (20 mg total) by mouth daily as needed (swelling).  -     Echo; Future    History of basal cell carcinoma (BCC) of skin    Aortic atherosclerosis

## 2025-05-16 ENCOUNTER — OFFICE VISIT (OUTPATIENT)
Dept: URGENT CARE | Facility: CLINIC | Age: 80
End: 2025-05-16
Payer: MEDICARE

## 2025-05-16 VITALS
OXYGEN SATURATION: 94 % | WEIGHT: 151 LBS | DIASTOLIC BLOOD PRESSURE: 72 MMHG | SYSTOLIC BLOOD PRESSURE: 107 MMHG | RESPIRATION RATE: 18 BRPM | HEIGHT: 64 IN | BODY MASS INDEX: 25.78 KG/M2 | TEMPERATURE: 99 F | HEART RATE: 62 BPM

## 2025-05-16 DIAGNOSIS — R09.81 NASAL CONGESTION: Primary | ICD-10-CM

## 2025-05-16 DIAGNOSIS — R05.9 COUGH, UNSPECIFIED TYPE: ICD-10-CM

## 2025-05-16 DIAGNOSIS — R06.2 WHEEZING: ICD-10-CM

## 2025-05-16 DIAGNOSIS — J42 CHRONIC BRONCHITIS, UNSPECIFIED CHRONIC BRONCHITIS TYPE: ICD-10-CM

## 2025-05-16 RX ORDER — ALBUTEROL SULFATE 0.83 MG/ML
2.5 SOLUTION RESPIRATORY (INHALATION)
Status: COMPLETED | OUTPATIENT
Start: 2025-05-16 | End: 2025-05-16

## 2025-05-16 RX ORDER — IPRATROPIUM BROMIDE 21 UG/1
2 SPRAY, METERED NASAL 2 TIMES DAILY PRN
Qty: 30 ML | Refills: 0 | Status: SHIPPED | OUTPATIENT
Start: 2025-05-16

## 2025-05-16 RX ORDER — METHYLPREDNISOLONE 4 MG/1
TABLET ORAL
Qty: 21 EACH | Refills: 0 | Status: SHIPPED | OUTPATIENT
Start: 2025-05-16 | End: 2025-06-06

## 2025-05-16 RX ORDER — ALBUTEROL SULFATE 0.83 MG/ML
2.5 SOLUTION RESPIRATORY (INHALATION) EVERY 6 HOURS PRN
Qty: 120 ML | Refills: 0 | Status: SHIPPED | OUTPATIENT
Start: 2025-05-16 | End: 2026-05-16

## 2025-05-16 RX ORDER — DOXYCYCLINE HYCLATE 100 MG
100 TABLET ORAL 2 TIMES DAILY
Qty: 20 TABLET | Refills: 0 | Status: SHIPPED | OUTPATIENT
Start: 2025-05-16

## 2025-05-16 RX ADMIN — ALBUTEROL SULFATE 2.5 MG: 0.83 SOLUTION RESPIRATORY (INHALATION) at 12:05

## 2025-05-16 NOTE — PROGRESS NOTES
Subjective:      Patient ID: Sury Gao is a 79 y.o. female.    Vitals:  vitals were not taken for this visit.     Chief Complaint: Sinus Problem    This is a 79 y.o. female who presents today with a chief complaint of sinus problem. Patient has been symptoms for ten days.  Patient has scratchy , nasal congestion and post nasal drip.       Sinus Problem  This is a new problem. The current episode started 1 to 4 weeks ago. The problem is unchanged. There has been no fever. Her pain is at a severity of 0/10. She is experiencing no pain. Associated symptoms include coughing. Past treatments include nasal decongestants (allegra, tylenol). The treatment provided mild relief.     Respiratory:  Positive for cough.     Objective:     Physical Exam   Constitutional: She is oriented to person, place, and time. She appears ill. She appears distressed. normal  HENT:   Head: Normocephalic and atraumatic.   Ears:   Right Ear: Tympanic membrane, external ear and ear canal normal.   Left Ear: Tympanic membrane, external ear and ear canal normal.   Nose: Rhinorrhea and congestion present.   Mouth/Throat: Mucous membranes are moist. Oropharynx is clear.   Eyes: Conjunctivae are normal. Pupils are equal, round, and reactive to light. Extraocular movement intact   Neck: Neck supple.   Cardiovascular: Normal rate, regular rhythm and normal pulses.   No murmur heard.Exam reveals no friction rub.   Pulmonary/Chest: Effort normal. She has wheezes. She has rhonchi.   Abdominal: Normal appearance and bowel sounds are normal. Soft. flat abdomen   Musculoskeletal: Normal range of motion.         General: Normal range of motion.   Neurological: no focal deficit. She is alert, oriented to person, place, and time and at baseline.   Skin: Skin is warm and dry. Capillary refill takes less than 2 seconds. No bruising   Psychiatric: Her behavior is normal. Mood, judgment and thought content normal.   Nursing note and vitals  reviewed.    Assessment:     Plan:   1. Nasal congestion  - ipratropium (ATROVENT) 21 mcg (0.03 %) nasal spray; 2 sprays by Each Nostril route 2 (two) times daily as needed.  Dispense: 30 mL; Refill: 0    2. Wheezing  - albuterol nebulizer solution 2.5 mg  - albuterol (PROVENTIL) 2.5 mg /3 mL (0.083 %) nebulizer solution; Take 3 mLs (2.5 mg total) by nebulization every 6 (six) hours as needed for Wheezing. Rescue  Dispense: 120 mL; Refill: 0  - methylPREDNISolone (MEDROL DOSEPACK) 4 mg tablet; use as directed  Dispense: 21 each; Refill: 0    3. Cough, unspecified type  - XR CHEST PA AND LATERAL; Future    4. Chronic bronchitis, unspecified chronic bronchitis type  - doxycycline (VIBRA-TABS) 100 MG tablet; Take 1 tablet (100 mg total) by mouth 2 (two) times daily.  Dispense: 20 tablet; Refill: 0

## 2025-05-25 ENCOUNTER — OFFICE VISIT (OUTPATIENT)
Dept: URGENT CARE | Facility: CLINIC | Age: 80
End: 2025-05-25
Payer: MEDICARE

## 2025-05-25 VITALS
WEIGHT: 151 LBS | HEIGHT: 64 IN | OXYGEN SATURATION: 95 % | HEART RATE: 60 BPM | SYSTOLIC BLOOD PRESSURE: 98 MMHG | RESPIRATION RATE: 16 BRPM | BODY MASS INDEX: 25.78 KG/M2 | DIASTOLIC BLOOD PRESSURE: 65 MMHG | TEMPERATURE: 98 F

## 2025-05-25 DIAGNOSIS — E03.9 ACQUIRED HYPOTHYROIDISM: ICD-10-CM

## 2025-05-25 DIAGNOSIS — R21 RASH DUE TO ALLERGY: Primary | ICD-10-CM

## 2025-05-25 DIAGNOSIS — R09.81 NASAL CONGESTION: ICD-10-CM

## 2025-05-25 DIAGNOSIS — T78.40XA RASH DUE TO ALLERGY: Primary | ICD-10-CM

## 2025-05-25 PROCEDURE — 99213 OFFICE O/P EST LOW 20 MIN: CPT | Mod: S$GLB,,, | Performed by: FAMILY MEDICINE

## 2025-05-25 RX ORDER — HYDROXYZINE PAMOATE 25 MG/1
25 CAPSULE ORAL 2 TIMES DAILY
Qty: 14 CAPSULE | Refills: 0 | Status: SHIPPED | OUTPATIENT
Start: 2025-05-25 | End: 2025-06-01

## 2025-05-25 NOTE — PROGRESS NOTES
"Subjective:      Patient ID: Sury Gao is a 79 y.o. female.    Vitals:  height is 5' 4" (1.626 m) and weight is 68.5 kg (151 lb). Her oral temperature is 97.7 °F (36.5 °C). Her blood pressure is 98/65 and her pulse is 60. Her respiration is 16 and oxygen saturation is 95%.     Chief Complaint: Allergic Reaction    This is a 79 y.o. female who presents today with a chief complaint of possible allergic reaction to antibiotics that was given to her on her last visit on 05/16/25 ( 10 days ago).  Patient states the medicine was prescribed for bronchitis for which the symptoms have resolved.   Pt has been in the sun and now she has rash on both feet legs and arms with some itching and burning. Denies throat swelling, chest pain, SOB, headache, dizziness, weakness.      Allergic Reaction  This is a new problem. The current episode started more than 1 week ago. The problem is unchanged. The problem is mild. The patient was exposed to an antibiotic. The time of exposure was  in time from the onset of symptoms.   ROS   Objective:     Physical Exam   Constitutional: She is oriented to person, place, and time. She appears well-developed. She is cooperative.  Non-toxic appearance. She does not appear ill. No distress.   HENT:   Head: Normocephalic and atraumatic. Head is without abrasion, without contusion and without laceration.   Ears:   Right Ear: Hearing, tympanic membrane, external ear and ear canal normal. no impacted cerumen  Left Ear: Hearing, tympanic membrane, external ear and ear canal normal. no impacted cerumen  Nose: Nose normal. No mucosal edema, rhinorrhea, nasal deformity or congestion. No epistaxis. Right sinus exhibits no maxillary sinus tenderness and no frontal sinus tenderness. Left sinus exhibits no maxillary sinus tenderness and no frontal sinus tenderness.   Mouth/Throat: Uvula is midline, oropharynx is clear and moist and mucous membranes are normal. No trismus in the jaw. Normal " dentition. No uvula swelling. No oropharyngeal exudate or posterior oropharyngeal erythema.   Eyes: Conjunctivae, EOM and lids are normal. Pupils are equal, round, and reactive to light.   Neck: Trachea normal and phonation normal. Neck supple.   Cardiovascular: Normal rate, regular rhythm, normal heart sounds and normal pulses.   No murmur heard.  Pulmonary/Chest: Effort normal and breath sounds normal. No stridor. No respiratory distress. She has no wheezes. She has no rhonchi. She has no rales.   Abdominal: Normal appearance and bowel sounds are normal. She exhibits no distension. Soft. There is no abdominal tenderness. There is no left CVA tenderness and no right CVA tenderness.   Musculoskeletal: Normal range of motion.         General: Normal range of motion.      Cervical back: She exhibits no tenderness.   Lymphadenopathy:     She has no cervical adenopathy.   Neurological: She is alert, oriented to person, place, and time and at baseline. She exhibits normal muscle tone.   Skin: Skin is warm, dry, intact, not diaphoretic and rash. Capillary refill takes less than 2 seconds. No abrasion, No burn and No ecchymosis         Comments:   Positive erythematous macular rash over upper and lower extremities.  No hives or vesicles.    No tongue, throat, face swelling.   Psychiatric: Her speech is normal and behavior is normal. Judgment and thought content normal.   Nursing note and vitals reviewed.      Assessment:     1. Rash due to allergy    2. Nasal congestion        Plan:   Discussed exam findings/diagnosis/plan with patient. Advised to f/u with PCP within 2-5 days. ER precautions given if symptoms get any worse. All questions answered. Patient verbally understood and agreed with treatment plan.  Educational materials and instructions regarding the visit diagnosis and management provided.     Rash due to allergy    Nasal congestion    Other orders  -     hydrOXYzine pamoate (VISTARIL) 25 MG Cap; Take 1 capsule  (25 mg total) by mouth 2 (two) times a day. for 7 days  Dispense: 14 capsule; Refill: 0

## 2025-05-25 NOTE — TELEPHONE ENCOUNTER
No care due was identified.  [facility] Embedded Care Due Messages. Reference number: [id number].   5/25/2025 2:07:13 PM CDT

## 2025-05-26 RX ORDER — LEVOTHYROXINE SODIUM 75 UG/1
75 TABLET ORAL
Qty: 90 TABLET | Refills: 2 | Status: SHIPPED | OUTPATIENT
Start: 2025-05-26

## 2025-05-26 NOTE — TELEPHONE ENCOUNTER
Refill Decision Note   Sury Gao  is requesting a refill authorization.  Brief Assessment and Rationale for Refill:  Approve     Medication Therapy Plan:        Comments:     Note composed:12:53 PM 05/26/2025

## 2025-05-27 ENCOUNTER — HOSPITAL ENCOUNTER (OUTPATIENT)
Dept: CARDIOLOGY | Facility: HOSPITAL | Age: 80
Discharge: HOME | End: 2025-05-27
Attending: FAMILY MEDICINE
Payer: MEDICARE

## 2025-05-27 VITALS
HEIGHT: 64 IN | HEART RATE: 60 BPM | SYSTOLIC BLOOD PRESSURE: 105 MMHG | WEIGHT: 151 LBS | BODY MASS INDEX: 25.78 KG/M2 | DIASTOLIC BLOOD PRESSURE: 60 MMHG

## 2025-05-27 DIAGNOSIS — M79.89 LEG SWELLING: ICD-10-CM

## 2025-05-27 PROCEDURE — 93306 TTE W/DOPPLER COMPLETE: CPT | Mod: 26,,, | Performed by: INTERNAL MEDICINE

## 2025-05-27 PROCEDURE — 93306 TTE W/DOPPLER COMPLETE: CPT

## 2025-05-28 LAB
AORTIC SIZE INDEX (SOV): 1.8 CM/M2
AORTIC SIZE INDEX: 2 CM/M2
ASCENDING AORTA: 3.5 CM
AV AREA BY CONTINUOUS VTI: 2.2 CM2
AV INDEX (PROSTH): 0.52
AV LVOT MEAN GRADIENT: 3 MMHG
AV LVOT PEAK GRADIENT: 5 MMHG
AV MEAN GRADIENT: 10 MMHG
AV PEAK GRADIENT: 16 MMHG
AV REGURGITATION PRESSURE HALF TIME: 700 MS
AV VALVE AREA BY VELOCITY RATIO: 2.3 CM²
AV VALVE AREA: 2.1 CM2
AV VELOCITY RATIO: 0.55
BSA FOR ECHO PROCEDURE: 1.76 M2
CV ECHO LV RWT: 0.37 CM
DOP CALC AO PEAK VEL: 2 M/S
DOP CALC AO VTI: 42.5 CM
DOP CALC LVOT AREA: 4.2 CM2
DOP CALC LVOT DIAMETER: 2.3 CM
DOP CALC LVOT PEAK VEL: 1.1 M/S
DOP CALC LVOT STROKE VOLUME: 90.9 CM3
DOP CALC RVOT AREA: 2.69 CM2
DOP CALC RVOT DIAMETER: 1.85 CM
DOP CALCLVOT PEAK VEL VTI: 21.9 CM
E WAVE DECELERATION TIME: 267 MS
E/A RATIO: 0.88
E/E' RATIO: 7 M/S
ECHO EF ESTIMATED: 64 %
ECHO LV POSTERIOR WALL: 0.8 CM (ref 0.6–1.1)
FRACTIONAL SHORTENING: 34.9 % (ref 28–44)
HR MV ECHO: 60 BPM
INTERVENTRICULAR SEPTUM: 0.8 CM (ref 0.6–1.1)
IVC DIAMETER: 1.11 CM
LA MAJOR: 4.4 CM
LA MINOR: 4.7 CM
LA WIDTH: 3.2 CM
LEFT ATRIUM SIZE: 3.6 CM
LEFT ATRIUM VOLUME INDEX MOD: 18 ML/M2
LEFT ATRIUM VOLUME INDEX: 26 ML/M2
LEFT ATRIUM VOLUME MOD: 32 ML
LEFT ATRIUM VOLUME: 45 CM3
LEFT INTERNAL DIMENSION IN SYSTOLE: 2.8 CM (ref 2.1–4)
LEFT VENTRICLE DIASTOLIC VOLUME INDEX: 46.55 ML/M2
LEFT VENTRICLE DIASTOLIC VOLUME: 81 ML
LEFT VENTRICLE MASS INDEX: 60.5 G/M2
LEFT VENTRICLE SYSTOLIC VOLUME INDEX: 16.7 ML/M2
LEFT VENTRICLE SYSTOLIC VOLUME: 29 ML
LEFT VENTRICULAR INTERNAL DIMENSION IN DIASTOLE: 4.3 CM (ref 3.5–6)
LEFT VENTRICULAR MASS: 105.3 G
LV LATERAL E/E' RATIO: 6.1
LV SEPTAL E/E' RATIO: 8.7
MV A" WAVE DURATION": 151.28 MS
MV MEAN GRADIENT: 1 MMHG
MV PEAK A VEL: 0.69 M/S
MV PEAK E VEL: 0.61 M/S
MV PEAK GRADIENT: 3 MMHG
OHS CV RV/LV RATIO: 0.58 CM
PISA TR MAX VEL: 2.3 M/S
PULM VEIN A" WAVE DURATION": 151.28 MS
PULM VEIN S/D RATIO: 1.26
PULMONIC VEIN PEAK A VELOCITY: 0.3 M/S
PV PEAK D VEL: 0.34 M/S
PV PEAK S VEL: 0.43 M/S
RA MAJOR: 3.71 CM
RA PRESSURE ESTIMATED: 3 MMHG
RA WIDTH: 3.28 CM
RIGHT ATRIAL AREA: 9.6 CM2
RIGHT VENTRICLE DIASTOLIC BASEL DIMENSION: 2.5 CM
RV TB RVSP: 5 MMHG
RV TISSUE DOPPLER FREE WALL SYSTOLIC VELOCITY 1 (APICAL 4 CHAMBER VIEW): 8.24 CM/S
SINUS: 3.2 CM
STJ: 3.2 CM
TDI LATERAL: 0.1 M/S
TDI SEPTAL: 0.07 M/S
TDI: 0.09 M/S
TRICUSPID ANNULAR PLANE SYSTOLIC EXCURSION: 2.1 CM
TV PEAK GRADIENT: 21 MMHG
TV REST PULMONARY ARTERY PRESSURE: 24 MMHG
Z-SCORE OF LEFT VENTRICULAR DIMENSION IN END DIASTOLE: -1.14
Z-SCORE OF LEFT VENTRICULAR DIMENSION IN END SYSTOLE: -0.49

## 2025-05-29 ENCOUNTER — RESULTS FOLLOW-UP (OUTPATIENT)
Dept: PRIMARY CARE CLINIC | Facility: CLINIC | Age: 80
End: 2025-05-29

## 2025-08-12 ENCOUNTER — LAB VISIT (OUTPATIENT)
Dept: LAB | Facility: HOSPITAL | Age: 80
End: 2025-08-12
Attending: FAMILY MEDICINE
Payer: MEDICARE

## 2025-08-12 DIAGNOSIS — M79.89 LEG SWELLING: ICD-10-CM

## 2025-08-12 DIAGNOSIS — E78.5 HYPERLIPIDEMIA, UNSPECIFIED HYPERLIPIDEMIA TYPE: ICD-10-CM

## 2025-08-12 LAB
ALBUMIN SERPL BCP-MCNC: 3.1 G/DL (ref 3.5–5.2)
ALP SERPL-CCNC: 32 UNIT/L (ref 40–150)
ALT SERPL W/O P-5'-P-CCNC: 10 UNIT/L (ref 0–55)
ANION GAP (OHS): 5 MMOL/L (ref 8–16)
AST SERPL-CCNC: 21 UNIT/L (ref 0–50)
BILIRUB SERPL-MCNC: 0.5 MG/DL (ref 0.1–1)
BUN SERPL-MCNC: 16 MG/DL (ref 8–23)
CALCIUM SERPL-MCNC: 8.2 MG/DL (ref 8.7–10.5)
CHLORIDE SERPL-SCNC: 107 MMOL/L (ref 95–110)
CO2 SERPL-SCNC: 28 MMOL/L (ref 23–29)
CREAT SERPL-MCNC: 0.8 MG/DL (ref 0.5–1.4)
GFR SERPLBLD CREATININE-BSD FMLA CKD-EPI: >60 ML/MIN/1.73/M2
GLUCOSE SERPL-MCNC: 81 MG/DL (ref 70–110)
POTASSIUM SERPL-SCNC: 4.4 MMOL/L (ref 3.5–5.1)
PROT SERPL-MCNC: 5.5 GM/DL (ref 6–8.4)
SODIUM SERPL-SCNC: 140 MMOL/L (ref 136–145)

## 2025-08-12 PROCEDURE — 80053 COMPREHEN METABOLIC PANEL: CPT | Mod: HCNC

## 2025-08-12 PROCEDURE — 36415 COLL VENOUS BLD VENIPUNCTURE: CPT | Mod: HCNC,PN

## 2025-08-19 ENCOUNTER — OFFICE VISIT (OUTPATIENT)
Dept: INTERNAL MEDICINE | Facility: CLINIC | Age: 80
End: 2025-08-19
Payer: MEDICARE

## 2025-08-19 VITALS
DIASTOLIC BLOOD PRESSURE: 60 MMHG | SYSTOLIC BLOOD PRESSURE: 100 MMHG | HEART RATE: 67 BPM | RESPIRATION RATE: 18 BRPM | WEIGHT: 150.69 LBS | BODY MASS INDEX: 25.86 KG/M2 | OXYGEN SATURATION: 96 % | TEMPERATURE: 98 F

## 2025-08-19 DIAGNOSIS — F41.9 ANXIETY: Primary | ICD-10-CM

## 2025-08-19 PROCEDURE — 3078F DIAST BP <80 MM HG: CPT | Mod: CPTII,HCNC,S$GLB, | Performed by: INTERNAL MEDICINE

## 2025-08-19 PROCEDURE — 99214 OFFICE O/P EST MOD 30 MIN: CPT | Mod: HCNC,S$GLB,, | Performed by: INTERNAL MEDICINE

## 2025-08-19 PROCEDURE — 1160F RVW MEDS BY RX/DR IN RCRD: CPT | Mod: CPTII,HCNC,S$GLB, | Performed by: INTERNAL MEDICINE

## 2025-08-19 PROCEDURE — 1101F PT FALLS ASSESS-DOCD LE1/YR: CPT | Mod: CPTII,HCNC,S$GLB, | Performed by: INTERNAL MEDICINE

## 2025-08-19 PROCEDURE — 1159F MED LIST DOCD IN RCRD: CPT | Mod: CPTII,HCNC,S$GLB, | Performed by: INTERNAL MEDICINE

## 2025-08-19 PROCEDURE — 3288F FALL RISK ASSESSMENT DOCD: CPT | Mod: CPTII,HCNC,S$GLB, | Performed by: INTERNAL MEDICINE

## 2025-08-19 PROCEDURE — 1126F AMNT PAIN NOTED NONE PRSNT: CPT | Mod: CPTII,HCNC,S$GLB, | Performed by: INTERNAL MEDICINE

## 2025-08-19 PROCEDURE — 3074F SYST BP LT 130 MM HG: CPT | Mod: CPTII,HCNC,S$GLB, | Performed by: INTERNAL MEDICINE

## 2025-08-19 PROCEDURE — 99999 PR PBB SHADOW E&M-EST. PATIENT-LVL III: CPT | Mod: PBBFAC,HCNC,, | Performed by: INTERNAL MEDICINE

## 2025-08-19 RX ORDER — ALPRAZOLAM 0.25 MG/1
TABLET ORAL
Qty: 20 TABLET | Refills: 0 | Status: SHIPPED | OUTPATIENT
Start: 2025-08-19

## 2025-09-03 DIAGNOSIS — Z91.09 ALLERGY TO ENVIRONMENTAL FACTORS: ICD-10-CM

## 2025-09-03 DIAGNOSIS — R42 EPISODIC LIGHTHEADEDNESS: ICD-10-CM

## 2025-09-03 RX ORDER — FLUTICASONE PROPIONATE 50 MCG
1 SPRAY, SUSPENSION (ML) NASAL DAILY
Qty: 48 G | Refills: 3 | Status: SHIPPED | OUTPATIENT
Start: 2025-09-03

## 2025-09-03 RX ORDER — FLUTICASONE PROPIONATE AND SALMETEROL 250; 50 UG/1; UG/1
1 POWDER RESPIRATORY (INHALATION) 2 TIMES DAILY
Qty: 60 EACH | Refills: 1 | Status: SHIPPED | OUTPATIENT
Start: 2025-09-03 | End: 2026-09-03

## (undated) DEVICE — TROCAR ENDOPATH XCEL 8MM 10CM

## (undated) DEVICE — BANDAGE KERLIX P/P 2.25IN STER

## (undated) DEVICE — CATH FOL IC 3WAY 16F 5CCLF

## (undated) DEVICE — TRAY DRY SKIN SCRUB PREP

## (undated) DEVICE — JELLY SURGILUBE 5GR

## (undated) DEVICE — FOOTSWITCH SUCTION IRRIGATION

## (undated) DEVICE — TROCAR ENDOPATH XCEL 5X100MM

## (undated) DEVICE — SYR 50CC LL

## (undated) DEVICE — TIP RUMI WHITE DISP UMW676

## (undated) DEVICE — DRAPE UNDER BUTTOCKS WITH POUCH

## (undated) DEVICE — SYR 10CC LUER LOCK

## (undated) DEVICE — ELECTRODE REM PLYHSV RETURN 9

## (undated) DEVICE — SUT VICRYL 0 27 CT-2

## (undated) DEVICE — ELECTRODE ELECSURG LAPSCP SPAT

## (undated) DEVICE — SOL 9P NACL IRR PIC IL

## (undated) DEVICE — CATH FOLEY 16F COUNCIL STA LOC

## (undated) DEVICE — SOL IRR NACL .9% 3000ML

## (undated) DEVICE — DRAPE STERI INSTRUMENT 1018

## (undated) DEVICE — SUT 0 8-27IN VICRYL PL CT-1

## (undated) DEVICE — SEALER LIGASURE MARYLAND 37CM

## (undated) DEVICE — SOL STRL WATER INJ 1000ML BG

## (undated) DEVICE — JELLY KY LUBRICATING 5G PACKET

## (undated) DEVICE — GLOVE BIOGEL SKINSENSE PI 7.0

## (undated) DEVICE — SET CYSTO IRRIGATION UNIV SPIK

## (undated) DEVICE — SEE MEDLINE ITEM 146347

## (undated) DEVICE — GAUZE PETROLATUM 1/2 X 72

## (undated) DEVICE — KIT URINE METER 350ML STAT LOC

## (undated) DEVICE — PACK LAPSCP/PELVSCPY III TIBRN

## (undated) DEVICE — SUT VICRYL+ 27 UR-6 VIOL

## (undated) DEVICE — SEE MEDLINE ITEM 156930

## (undated) DEVICE — GLOVE BIOGEL SKINSENSE PI 6.0

## (undated) DEVICE — SCRUB 10% POVIDONE IODINE 4OZ

## (undated) DEVICE — PAD PREP 50/CA

## (undated) DEVICE — BLADE SURG STAINLESS STEEL #15

## (undated) DEVICE — KIT WING PAD POSITIONING

## (undated) DEVICE — STAPLER INT HERNIA PROTACK 5MM

## (undated) DEVICE — SOL PVP-I SCRUB 7.5% 4OZ

## (undated) DEVICE — SCISSOR 5MMX35CM DIRECT DRIVE

## (undated) DEVICE — SOL CLEARIFY VISUALIZATION LAP

## (undated) DEVICE — NDL INSUF ULTRA VERESS 120MM

## (undated) DEVICE — SUT 0 V-LOC GR GS-21 TAPER

## (undated) DEVICE — PAD ABD 8X10 STERILE

## (undated) DEVICE — APPLICATOR ENDOSCOPIC

## (undated) DEVICE — BANDAGE GAUZE 6PLY FLUFF 2X3

## (undated) DEVICE — DRESSING LEUKOPLAST FLEX 1X3IN

## (undated) DEVICE — NDL HYPO REG 25G X 1 1/2

## (undated) DEVICE — UNDERGLOVES BIOGEL PI SZ 7 LF

## (undated) DEVICE — SUT MCRYL PLUS 4-0 PS2 27IN

## (undated) DEVICE — CLOSURE SKIN STERI STRIP 1/2X4

## (undated) DEVICE — IRRIGATOR ENDOSCOPY DISP.

## (undated) DEVICE — TIP RUMI KOH-EFFICIENT 3.5

## (undated) DEVICE — KIT PROCEDURE STER INLET CLOSU

## (undated) DEVICE — TROCAR ENDOPATH XCEL 11MM 10CM

## (undated) DEVICE — PACK LAPAROSCOPY BAPTIST

## (undated) DEVICE — ADHESIVE DERMABOND ADVANCED

## (undated) DEVICE — SPONGE LAP 18X18 PREWASHED

## (undated) DEVICE — SEE MEDLINE ITEM 152622

## (undated) DEVICE — SEE MEDLINE ITEM 157117

## (undated) DEVICE — KIT SURGIFLO HEMOSTATIC MATRIX

## (undated) DEVICE — CANNULA ENDOPATH XCEL 5X100MM